# Patient Record
Sex: MALE | Race: BLACK OR AFRICAN AMERICAN | NOT HISPANIC OR LATINO | URBAN - METROPOLITAN AREA
[De-identification: names, ages, dates, MRNs, and addresses within clinical notes are randomized per-mention and may not be internally consistent; named-entity substitution may affect disease eponyms.]

---

## 2018-02-10 ENCOUNTER — EMERGENCY (EMERGENCY)
Facility: HOSPITAL | Age: 68
LOS: 1 days | Discharge: HOME | End: 2018-02-10
Attending: EMERGENCY MEDICINE
Payer: COMMERCIAL

## 2018-02-10 VITALS
SYSTOLIC BLOOD PRESSURE: 145 MMHG | HEART RATE: 60 BPM | WEIGHT: 250 LBS | RESPIRATION RATE: 20 BRPM | DIASTOLIC BLOOD PRESSURE: 85 MMHG | HEIGHT: 74 IN | TEMPERATURE: 98 F | OXYGEN SATURATION: 99 %

## 2018-02-10 LAB
ANION GAP SERPL CALC-SCNC: 8 MMOL/L — SIGNIFICANT CHANGE UP (ref 5–17)
BASOPHILS # BLD AUTO: 0 K/UL — SIGNIFICANT CHANGE UP (ref 0–0.2)
BASOPHILS NFR BLD AUTO: 0.4 % — SIGNIFICANT CHANGE UP (ref 0–2)
BUN SERPL-MCNC: 13 MG/DL — SIGNIFICANT CHANGE UP (ref 8–20)
CALCIUM SERPL-MCNC: 8.5 MG/DL — LOW (ref 8.6–10.2)
CHLORIDE SERPL-SCNC: 102 MMOL/L — SIGNIFICANT CHANGE UP (ref 98–107)
CO2 SERPL-SCNC: 24 MMOL/L — SIGNIFICANT CHANGE UP (ref 22–29)
CREAT SERPL-MCNC: 1.39 MG/DL — HIGH (ref 0.5–1.3)
EOSINOPHIL # BLD AUTO: 0.2 K/UL — SIGNIFICANT CHANGE UP (ref 0–0.5)
EOSINOPHIL NFR BLD AUTO: 4.6 % — SIGNIFICANT CHANGE UP (ref 0–5)
ERYTHROCYTE [SEDIMENTATION RATE] IN BLOOD: 15 MM/HR — SIGNIFICANT CHANGE UP (ref 0–20)
GLUCOSE SERPL-MCNC: 98 MG/DL — SIGNIFICANT CHANGE UP (ref 70–115)
HCT VFR BLD CALC: 39.1 % — LOW (ref 42–52)
HGB BLD-MCNC: 12.8 G/DL — LOW (ref 14–18)
LYMPHOCYTES # BLD AUTO: 1.9 K/UL — SIGNIFICANT CHANGE UP (ref 1–4.8)
LYMPHOCYTES # BLD AUTO: 35.7 % — SIGNIFICANT CHANGE UP (ref 20–55)
MCHC RBC-ENTMCNC: 28.1 PG — SIGNIFICANT CHANGE UP (ref 27–31)
MCHC RBC-ENTMCNC: 32.7 G/DL — SIGNIFICANT CHANGE UP (ref 32–36)
MCV RBC AUTO: 85.9 FL — SIGNIFICANT CHANGE UP (ref 80–94)
MONOCYTES # BLD AUTO: 0.7 K/UL — SIGNIFICANT CHANGE UP (ref 0–0.8)
MONOCYTES NFR BLD AUTO: 13.4 % — HIGH (ref 3–10)
NEUTROPHILS # BLD AUTO: 2.5 K/UL — SIGNIFICANT CHANGE UP (ref 1.8–8)
NEUTROPHILS NFR BLD AUTO: 45.9 % — SIGNIFICANT CHANGE UP (ref 37–73)
PLATELET # BLD AUTO: 180 K/UL — SIGNIFICANT CHANGE UP (ref 150–400)
POTASSIUM SERPL-MCNC: 5 MMOL/L — SIGNIFICANT CHANGE UP (ref 3.5–5.3)
POTASSIUM SERPL-SCNC: 5 MMOL/L — SIGNIFICANT CHANGE UP (ref 3.5–5.3)
RBC # BLD: 4.55 M/UL — LOW (ref 4.6–6.2)
RBC # FLD: 14.4 % — SIGNIFICANT CHANGE UP (ref 11–15.6)
SODIUM SERPL-SCNC: 134 MMOL/L — LOW (ref 135–145)
WBC # BLD: 5.4 K/UL — SIGNIFICANT CHANGE UP (ref 4.8–10.8)
WBC # FLD AUTO: 5.4 K/UL — SIGNIFICANT CHANGE UP (ref 4.8–10.8)

## 2018-02-10 PROCEDURE — 70450 CT HEAD/BRAIN W/O DYE: CPT | Mod: 26

## 2018-02-10 PROCEDURE — 93010 ELECTROCARDIOGRAM REPORT: CPT

## 2018-02-10 PROCEDURE — 85027 COMPLETE CBC AUTOMATED: CPT

## 2018-02-10 PROCEDURE — 80048 BASIC METABOLIC PNL TOTAL CA: CPT

## 2018-02-10 PROCEDURE — 93005 ELECTROCARDIOGRAM TRACING: CPT

## 2018-02-10 PROCEDURE — 99284 EMERGENCY DEPT VISIT MOD MDM: CPT | Mod: 25

## 2018-02-10 PROCEDURE — 70450 CT HEAD/BRAIN W/O DYE: CPT

## 2018-02-10 PROCEDURE — 36415 COLL VENOUS BLD VENIPUNCTURE: CPT

## 2018-02-10 PROCEDURE — 85652 RBC SED RATE AUTOMATED: CPT

## 2018-02-10 RX ORDER — IBUPROFEN 200 MG
1 TABLET ORAL
Qty: 16 | Refills: 0
Start: 2018-02-10 | End: 2018-02-13

## 2018-02-10 RX ORDER — IBUPROFEN 200 MG
600 TABLET ORAL ONCE
Qty: 0 | Refills: 0 | Status: COMPLETED | OUTPATIENT
Start: 2018-02-10 | End: 2018-02-10

## 2018-02-10 RX ADMIN — Medication 600 MILLIGRAM(S): at 01:19

## 2018-02-10 NOTE — ED PROVIDER NOTE - OBJECTIVE STATEMENT
69 y/o male with PMHx HTN presents to the ED with c/o left sided jaw pain radiating to left temporal, onset 1 week ago. Pt states pain occurred while eating 1 week ago. Pt states pain is worse with eating. Pt reports "clicking" sound and difficulty opening jaw secondary to pain. Pt denies fever, chills, headache, and any other acute symptoms and complaints at this time.

## 2018-02-10 NOTE — ED ADULT NURSE NOTE - OBJECTIVE STATEMENT
received pt AOx4 in supertrack, c/o left sided jaw pain, and head ache, states it started a week ago while he was eating he felt a sharp pain and states the pain never went away. "hurts to eat",  resp even unlabored, in no distress, MAEx4, neuro intact. will continue to monitor

## 2018-02-10 NOTE — ED PROVIDER NOTE - ATTENDING CONTRIBUTION TO CARE
68y old with complaints of left jaw pain with temporal pain, no vision loss, no headache, no paresthesia. possible temporal arteritis, no tia, visual changes. plan to check labs, ct, follow up with dental, possibe tmj. ekg, follow up with neurology and cardiology, pmf

## 2019-02-17 ENCOUNTER — EMERGENCY (EMERGENCY)
Facility: HOSPITAL | Age: 69
LOS: 1 days | Discharge: HOME | End: 2019-02-17
Attending: STUDENT IN AN ORGANIZED HEALTH CARE EDUCATION/TRAINING PROGRAM
Payer: COMMERCIAL

## 2019-02-17 VITALS
SYSTOLIC BLOOD PRESSURE: 170 MMHG | HEART RATE: 70 BPM | OXYGEN SATURATION: 99 % | RESPIRATION RATE: 18 BRPM | DIASTOLIC BLOOD PRESSURE: 83 MMHG | HEIGHT: 74 IN | WEIGHT: 244.93 LBS | TEMPERATURE: 98 F

## 2019-02-17 PROCEDURE — 99283 EMERGENCY DEPT VISIT LOW MDM: CPT

## 2019-02-17 RX ORDER — DIPHENHYDRAMINE HCL 50 MG
25 CAPSULE ORAL ONCE
Qty: 0 | Refills: 0 | Status: COMPLETED | OUTPATIENT
Start: 2019-02-17 | End: 2019-02-17

## 2019-02-17 RX ORDER — DIPHENHYDRAMINE HCL 50 MG
1 CAPSULE ORAL
Qty: 20 | Refills: 0
Start: 2019-02-17

## 2019-02-17 RX ADMIN — Medication 25 MILLIGRAM(S): at 18:30

## 2019-02-17 RX ADMIN — Medication 50 MILLIGRAM(S): at 18:29

## 2019-02-17 NOTE — ED STATDOCS - OBJECTIVE STATEMENT
69 y.o M c/o b/l eye itching, left eye redness and left eyelid swelling today with some associated dyspnea. Symptoms started while pt was resting on the couch, unclear precipitating factors. Similar symptoms in the past after drinking strawberry milk. Denies difficulty swallowing, pain with movement of the eyes, blurry vision, eye pain or additional physical complaints at this time. 69 y.o M c/o b/l eye itching, left eye redness and left eyelid swelling today. Symptoms started while pt was resting on the couch, unclear precipitating factors. Similar symptoms in the past after drinking strawberry milk. Denies difficulty swallowing, pain with movement of the eyes, blurry vision, eye pain or additional physical complaints at this time.

## 2019-02-17 NOTE — ED STATDOCS - CLINICAL SUMMARY MEDICAL DECISION MAKING FREE TEXT BOX
69 y.o M pt with likely allergic reaction, no signs of cellulitis. Will treat with benadryl and steroids, have patient f/u with outpatient, instructed to return with any new or worsening symptoms.

## 2019-02-17 NOTE — ED STATDOCS - NS ED ROS FT
Review of Systems  •	CONSTITUTIONAL - no  fever, no diaphoresis, no weight change  •	SKIN - no rash  •	HEMATOLOGIC - no bleeding, no bruising  •	EYES - no eye pain, no blurred vision, (+) eyelid swelling, (+) eye redness, (+) eye itching  •	ENT - no change in hearing, no pain  •	RESPIRATORY - no shortness of breath, no cough  •	CARDIAC - no chest pain, no palpitations  •	GI - no abd pain, no nausea, no vomiting, no diarrhea, no constipation, no bleeding  •	GENITO-URINARY - no discharge, no dysuria; no hematuria,

## 2019-02-17 NOTE — ED STATDOCS - PHYSICAL EXAMINATION
Constitutional - well-developed; well nourished. Head - NCAT. Airway patent. Eyes - EOMI, swelling to left upper eyelid, no conjunctival injection, no pain on extraocular movement, no tenderness to palpation of the swelling, vision grossly intact. CV - RRR. no murmur. no edema. Pulm - CTAB. Abd - soft, nt. no rebound. no guarding. Neuro - A&Ox3. strength 5/5 x4. sensation intact x4. normal gait. Skin - No rash. MSK - normal ROM.

## 2019-02-18 PROBLEM — I10 ESSENTIAL (PRIMARY) HYPERTENSION: Chronic | Status: ACTIVE | Noted: 2018-02-10

## 2020-03-19 ENCOUNTER — EMERGENCY (EMERGENCY)
Facility: HOSPITAL | Age: 70
LOS: 1 days | Discharge: HOME | End: 2020-03-19
Attending: EMERGENCY MEDICINE
Payer: COMMERCIAL

## 2020-03-19 VITALS
OXYGEN SATURATION: 97 % | TEMPERATURE: 98 F | HEART RATE: 96 BPM | RESPIRATION RATE: 18 BRPM | WEIGHT: 214.07 LBS | SYSTOLIC BLOOD PRESSURE: 148 MMHG | HEIGHT: 74 IN | DIASTOLIC BLOOD PRESSURE: 85 MMHG

## 2020-03-19 VITALS
DIASTOLIC BLOOD PRESSURE: 80 MMHG | OXYGEN SATURATION: 99 % | HEART RATE: 82 BPM | SYSTOLIC BLOOD PRESSURE: 150 MMHG | RESPIRATION RATE: 20 BRPM | TEMPERATURE: 98 F

## 2020-03-19 LAB
ALBUMIN SERPL ELPH-MCNC: 3.2 G/DL — LOW (ref 3.3–5.2)
ALP SERPL-CCNC: 58 U/L — SIGNIFICANT CHANGE UP (ref 40–120)
ALT FLD-CCNC: 16 U/L — SIGNIFICANT CHANGE UP
ANION GAP SERPL CALC-SCNC: 9 MMOL/L — SIGNIFICANT CHANGE UP (ref 5–17)
ANISOCYTOSIS BLD QL: SLIGHT — SIGNIFICANT CHANGE UP
APPEARANCE UR: CLEAR — SIGNIFICANT CHANGE UP
AST SERPL-CCNC: 29 U/L — SIGNIFICANT CHANGE UP
BACTERIA # UR AUTO: NEGATIVE — SIGNIFICANT CHANGE UP
BASOPHILS # BLD AUTO: 0 K/UL — SIGNIFICANT CHANGE UP (ref 0–0.2)
BASOPHILS NFR BLD AUTO: 0 % — SIGNIFICANT CHANGE UP (ref 0–2)
BILIRUB SERPL-MCNC: 0.4 MG/DL — SIGNIFICANT CHANGE UP (ref 0.4–2)
BILIRUB UR-MCNC: NEGATIVE — SIGNIFICANT CHANGE UP
BUN SERPL-MCNC: 13 MG/DL — SIGNIFICANT CHANGE UP (ref 8–20)
BURR CELLS BLD QL SMEAR: PRESENT — SIGNIFICANT CHANGE UP
CALCIUM SERPL-MCNC: 8.2 MG/DL — LOW (ref 8.6–10.2)
CHLORIDE SERPL-SCNC: 108 MMOL/L — HIGH (ref 98–107)
CK SERPL-CCNC: 134 U/L — SIGNIFICANT CHANGE UP (ref 30–200)
CO2 SERPL-SCNC: 19 MMOL/L — LOW (ref 22–29)
COLOR SPEC: YELLOW — SIGNIFICANT CHANGE UP
CREAT SERPL-MCNC: 1.36 MG/DL — HIGH (ref 0.5–1.3)
DACRYOCYTES BLD QL SMEAR: SLIGHT — SIGNIFICANT CHANGE UP
DIFF PNL FLD: ABNORMAL
ELLIPTOCYTES BLD QL SMEAR: SLIGHT — SIGNIFICANT CHANGE UP
EOSINOPHIL # BLD AUTO: 0 K/UL — SIGNIFICANT CHANGE UP (ref 0–0.5)
EOSINOPHIL NFR BLD AUTO: 0 % — SIGNIFICANT CHANGE UP (ref 0–6)
EPI CELLS # UR: SIGNIFICANT CHANGE UP
GIANT PLATELETS BLD QL SMEAR: PRESENT — SIGNIFICANT CHANGE UP
GLUCOSE SERPL-MCNC: 99 MG/DL — SIGNIFICANT CHANGE UP (ref 70–99)
GLUCOSE UR QL: NEGATIVE MG/DL — SIGNIFICANT CHANGE UP
HCT VFR BLD CALC: 36.7 % — LOW (ref 39–50)
HGB BLD-MCNC: 11.8 G/DL — LOW (ref 13–17)
HYPOCHROMIA BLD QL: SLIGHT — SIGNIFICANT CHANGE UP
KETONES UR-MCNC: NEGATIVE — SIGNIFICANT CHANGE UP
LEUKOCYTE ESTERASE UR-ACNC: NEGATIVE — SIGNIFICANT CHANGE UP
LYMPHOCYTES # BLD AUTO: 0.7 K/UL — LOW (ref 1–3.3)
LYMPHOCYTES # BLD AUTO: 11.5 % — LOW (ref 13–44)
MACROCYTES BLD QL: SLIGHT — SIGNIFICANT CHANGE UP
MANUAL SMEAR VERIFICATION: SIGNIFICANT CHANGE UP
MCHC RBC-ENTMCNC: 28.8 PG — SIGNIFICANT CHANGE UP (ref 27–34)
MCHC RBC-ENTMCNC: 32.2 GM/DL — SIGNIFICANT CHANGE UP (ref 32–36)
MCV RBC AUTO: 89.5 FL — SIGNIFICANT CHANGE UP (ref 80–100)
MICROCYTES BLD QL: SLIGHT — SIGNIFICANT CHANGE UP
MONOCYTES # BLD AUTO: 0.38 K/UL — SIGNIFICANT CHANGE UP (ref 0–0.9)
MONOCYTES NFR BLD AUTO: 6.2 % — SIGNIFICANT CHANGE UP (ref 2–14)
NEUTROPHILS # BLD AUTO: 4.93 K/UL — SIGNIFICANT CHANGE UP (ref 1.8–7.4)
NEUTROPHILS NFR BLD AUTO: 80.5 % — HIGH (ref 43–77)
NEUTS BAND # BLD: 0.9 % — SIGNIFICANT CHANGE UP (ref 0–8)
NITRITE UR-MCNC: NEGATIVE — SIGNIFICANT CHANGE UP
OVALOCYTES BLD QL SMEAR: SLIGHT — SIGNIFICANT CHANGE UP
PH UR: 6 — SIGNIFICANT CHANGE UP (ref 5–8)
PLAT MORPH BLD: NORMAL — SIGNIFICANT CHANGE UP
PLATELET # BLD AUTO: 142 K/UL — LOW (ref 150–400)
POIKILOCYTOSIS BLD QL AUTO: SLIGHT — SIGNIFICANT CHANGE UP
POLYCHROMASIA BLD QL SMEAR: SLIGHT — SIGNIFICANT CHANGE UP
POTASSIUM SERPL-MCNC: 4.3 MMOL/L — SIGNIFICANT CHANGE UP (ref 3.5–5.3)
POTASSIUM SERPL-SCNC: 4.3 MMOL/L — SIGNIFICANT CHANGE UP (ref 3.5–5.3)
PROT SERPL-MCNC: 7.2 G/DL — SIGNIFICANT CHANGE UP (ref 6.6–8.7)
PROT UR-MCNC: 30 MG/DL
RBC # BLD: 4.1 M/UL — LOW (ref 4.2–5.8)
RBC # FLD: 15.2 % — HIGH (ref 10.3–14.5)
RBC BLD AUTO: ABNORMAL
RBC CASTS # UR COMP ASSIST: ABNORMAL /HPF (ref 0–4)
SODIUM SERPL-SCNC: 136 MMOL/L — SIGNIFICANT CHANGE UP (ref 135–145)
SP GR SPEC: 1.01 — SIGNIFICANT CHANGE UP (ref 1.01–1.02)
TROPONIN T SERPL-MCNC: <0.01 NG/ML — SIGNIFICANT CHANGE UP (ref 0–0.06)
UROBILINOGEN FLD QL: NEGATIVE MG/DL — SIGNIFICANT CHANGE UP
VARIANT LYMPHS # BLD: 0.9 % — SIGNIFICANT CHANGE UP (ref 0–6)
WBC # BLD: 6.06 K/UL — SIGNIFICANT CHANGE UP (ref 3.8–10.5)
WBC # FLD AUTO: 6.06 K/UL — SIGNIFICANT CHANGE UP (ref 3.8–10.5)
WBC UR QL: SIGNIFICANT CHANGE UP

## 2020-03-19 PROCEDURE — 82962 GLUCOSE BLOOD TEST: CPT

## 2020-03-19 PROCEDURE — 82550 ASSAY OF CK (CPK): CPT

## 2020-03-19 PROCEDURE — 84484 ASSAY OF TROPONIN QUANT: CPT

## 2020-03-19 PROCEDURE — 36415 COLL VENOUS BLD VENIPUNCTURE: CPT

## 2020-03-19 PROCEDURE — 93010 ELECTROCARDIOGRAM REPORT: CPT

## 2020-03-19 PROCEDURE — 93005 ELECTROCARDIOGRAM TRACING: CPT

## 2020-03-19 PROCEDURE — 70450 CT HEAD/BRAIN W/O DYE: CPT

## 2020-03-19 PROCEDURE — 99284 EMERGENCY DEPT VISIT MOD MDM: CPT | Mod: 25

## 2020-03-19 PROCEDURE — 71046 X-RAY EXAM CHEST 2 VIEWS: CPT

## 2020-03-19 PROCEDURE — 96374 THER/PROPH/DIAG INJ IV PUSH: CPT

## 2020-03-19 PROCEDURE — 85027 COMPLETE CBC AUTOMATED: CPT

## 2020-03-19 PROCEDURE — 71046 X-RAY EXAM CHEST 2 VIEWS: CPT | Mod: 26

## 2020-03-19 PROCEDURE — 99285 EMERGENCY DEPT VISIT HI MDM: CPT

## 2020-03-19 PROCEDURE — 81001 URINALYSIS AUTO W/SCOPE: CPT

## 2020-03-19 PROCEDURE — 70450 CT HEAD/BRAIN W/O DYE: CPT | Mod: 26

## 2020-03-19 PROCEDURE — 80053 COMPREHEN METABOLIC PANEL: CPT

## 2020-03-19 RX ORDER — METOCLOPRAMIDE HCL 10 MG
10 TABLET ORAL ONCE
Refills: 0 | Status: COMPLETED | OUTPATIENT
Start: 2020-03-19 | End: 2020-03-19

## 2020-03-19 RX ORDER — MECLIZINE HCL 12.5 MG
2 TABLET ORAL
Qty: 20 | Refills: 0
Start: 2020-03-19 | End: 2020-03-23

## 2020-03-19 RX ORDER — MECLIZINE HCL 12.5 MG
25 TABLET ORAL ONCE
Refills: 0 | Status: COMPLETED | OUTPATIENT
Start: 2020-03-19 | End: 2020-03-19

## 2020-03-19 RX ORDER — LORATADINE 10 MG/1
10 TABLET ORAL DAILY
Refills: 0 | Status: DISCONTINUED | OUTPATIENT
Start: 2020-03-19 | End: 2020-03-24

## 2020-03-19 RX ADMIN — Medication 10 MILLIGRAM(S): at 09:27

## 2020-03-19 RX ADMIN — LORATADINE 10 MILLIGRAM(S): 10 TABLET ORAL at 12:39

## 2020-03-19 RX ADMIN — Medication 25 MILLIGRAM(S): at 09:26

## 2020-03-19 RX ADMIN — Medication 25 MILLIGRAM(S): at 10:25

## 2020-03-19 NOTE — ED PROVIDER NOTE - PATIENT PORTAL LINK FT
You can access the FollowMyHealth Patient Portal offered by [facility] by registering at the following website: [email or web address]. By joining [facility]’s FollowMyHealth portal, you will also be able to view your health information using other applications (apps) compatible with our system.

## 2020-03-19 NOTE — ED ADULT NURSE REASSESSMENT NOTE - NS ED NURSE REASSESS COMMENT FT1
1025- pt was evaluated by ER MD. blood work drawn. pt medciated for dizziness after passing dysphagia screen. pt ambulatory to bathroom, gait steady however mild tremor noted to body. ER MD aware. no distress noted. denies daily alcohol use. skin warm, dry.

## 2020-03-19 NOTE — ED PROVIDER NOTE - PHYSICAL EXAMINATION
Gen: Alert, NAD  Head: NC, AT, PERRL, EOMI, normal lids/conjunctiva  ENT: normal hearing, patent oropharynx without erythema/exudate, uvula midline  Neck: +supple, no tenderness/meningismus/JVD, +Trachea midline  Pulm: Bilateral BS, normal resp effort, no wheeze/stridor/retractions  CV: RRR, no M/R/G, +dist pulses  Abd: soft, NT/ND, +BS, no hepatosplenomegaly  Mskel: no edema/erythema/cyanosis  Skin: no rash  Neuro: AAOx3, no gross sensory/motor deficits, no dysmetria

## 2020-03-19 NOTE — ED ADULT TRIAGE NOTE - CHIEF COMPLAINT QUOTE
"my BP is high." denies chest pain or headache. pt states took BP medicine last night with no change in BP.

## 2020-03-19 NOTE — ED STATDOCS - PROGRESS NOTE DETAILS
71 y/o M pt with significant PMHx of HTN presents to the ED c/o HTN onset last night. Associated symptoms include altered gait, dizziness, fatigue, hand tightness and right ear pain. He reports that he feels like his BP is high. Patient states he does not feel "comfortable". He states he went to sleep at 21:00 last night, and woke up at 02:00 this morning with "hand tightness" and reports that he was having trouble sleeping after that. Patient has HTN medication, and has been compliant with it. Denies fever, chills, cough. Non drinker. On PE: right based gait with ataxia. congested eyes and teary on exam. Patient does not appear with stroke like symptoms, woke up at 02:00. Does not meet stroke criteria. NIH stroke scale=0. Will obtain finger stick, EKG and send to main for further evaluation. Patient is poor historian. 71 y/o M pt with significant PMHx of HTN presents to the ED c/o HTN onset last night. Associated symptoms include altered gait, dizziness, fatigue, hand tightness and right ear pain. He reports that he feels like his BP is high. Patient states he does not feel "comfortable". He states he went to sleep at 21:00 last night, and woke up at 02:00 this morning with "hand tightness" and reports that he was having trouble sleeping after that. Patient has HTN medication, and has been compliant with it. Denies fever, chills, cough. Non drinker. On PE: right based gait with ataxia. Some rigidity, congested eyes and teary on exam. Patient does not appear with stroke like symptoms, woke up at 02:00. Does not meet stroke criteria. NIH stroke scale=0. Will obtain finger stick, EKG and send to [facility] for further evaluation. Patient is also noted to be a poor historian.

## 2020-03-19 NOTE — ED ADULT NURSE REASSESSMENT NOTE - NS ED NURSE REASSESS COMMENT FT1
1230- pt remains with neuro WNL with the exception of mild tremor generalized. v/s stable. no distress noted. comfort and safety measures in place.   1534- pt was reevaluated by ER MD. no distress noted. neuro remains WNL with the exception of mild generalized tremor. DC instructions reviewed with pt - see ED Nurse note. pt was ambulatory out of ED with SNA to escort pt to waiting room, gait steady, wife to drive pt home.

## 2020-03-19 NOTE — ED ADULT NURSE NOTE - OBJECTIVE STATEMENT
pt presents to ED reporting he felt fine last night and then awoke at 2am "not feeling right" reports he walked to the bathroom and felt unsteady gait. pt denies any cough/fevers. denies any sick contacts. denies recent travel. pt a&ox4. v/s stable. no facial droop/arm drift/slurred speech noted. reports feeling dizzy at times. denies any chest pain/sob. skin warm, dry.

## 2020-03-19 NOTE — ED PROCEDURE NOTE - GENERAL PROCEDURE DETAILS
Educational study of the chest. Normal EF, no D sign, no pericardial effusion, normal lung sliding, no B lines or pleural effusion, IVC within normal limits. Confirmatory study obtained.

## 2020-03-19 NOTE — ED PROVIDER NOTE - OBJECTIVE STATEMENT
Pertinent PMH/PSH/FHx/SHx and Review of Systems contained within:  Patient presents to the ED for "feeling dizzy."  Patient went to sleep at baseline.  awoke at 0200 with dizziness.  PMH of HTN.  Unlike initial statdoc, patient is valid historian.  offered  but refused.  VSS.  Feeling better in ED.  no trauma.  no other concerns.  no focal deficits.  NIHSS is zero during hx.   no trauma.  Patient reports his ear feels congested.  Non toxic.  Well appearing. No aggravating or relieving factors. No other pertinent PMH.  No other pertinent PSH.  No other pertinent FHx.  Patient denies EtOH/tobacco/illicit substance use. No fever/chills, No photophobia/eye pain/changes in vision, No sore throat/dysphagia, No chest pain/palpitations, no SOB/cough/wheeze/stridor, No abdominal pain, No N/V/D, no dysuria/frequency/discharge, No neck/back pain, no rash, no other changes in neurological status/function.

## 2020-03-19 NOTE — ED PROVIDER NOTE - CARE PROVIDER_API CALL
Michael Caputo; PhD)  Neurology; Vascular Neurology  [address] [place] [address]  Phone: [phone]  Fax: [phone]  Follow Up Time:

## 2020-03-19 NOTE — ED PROVIDER NOTE - CLINICAL SUMMARY MEDICAL DECISION MAKING FREE TEXT BOX
Patient presents with vertigo.  VSS.  Lab values do not require emergent intervention. Chest xray demonstrates no acute pathology. CT scan demonstrates no acute pathology. Patient reassessed and no longer dizzy.  smiling.  again refuses .  nicole lf/u.  Uneventful ED observation period. Non toxic.  Well appearing. Patient given prescription medications for their condition and advised to take them as prescribed and check with their Primary Care Provider if any questions arise. Discussed results and outcome of testing with the patient.  Patient advised to please follow up with their primary care doctor within the next 24 hours and return to the Emergency Department for worsening symptoms or any other concerns.  Patient advised that their doctor may call  to follow up on the specific results of the tests performed today in the emergency department.

## 2020-03-20 ENCOUNTER — INPATIENT (INPATIENT)
Facility: HOSPITAL | Age: 70
LOS: 17 days | Discharge: HOME | DRG: 208 | End: 2020-04-07
Attending: HOSPITALIST | Admitting: INTERNAL MEDICINE
Payer: COMMERCIAL

## 2020-03-20 VITALS
DIASTOLIC BLOOD PRESSURE: 87 MMHG | HEIGHT: 74 IN | TEMPERATURE: 97 F | RESPIRATION RATE: 15 BRPM | OXYGEN SATURATION: 98 % | WEIGHT: 220.46 LBS | HEART RATE: 87 BPM | SYSTOLIC BLOOD PRESSURE: 152 MMHG

## 2020-03-20 DIAGNOSIS — J96.01 ACUTE RESPIRATORY FAILURE WITH HYPOXIA: ICD-10-CM

## 2020-03-20 DIAGNOSIS — R56.9 UNSPECIFIED CONVULSIONS: ICD-10-CM

## 2020-03-20 DIAGNOSIS — J69.0 PNEUMONITIS DUE TO INHALATION OF FOOD AND VOMIT: ICD-10-CM

## 2020-03-20 DIAGNOSIS — I10 ESSENTIAL (PRIMARY) HYPERTENSION: ICD-10-CM

## 2020-03-20 LAB
ALBUMIN SERPL ELPH-MCNC: 3.3 G/DL — SIGNIFICANT CHANGE UP (ref 3.3–5.2)
ALP SERPL-CCNC: 91 U/L — SIGNIFICANT CHANGE UP (ref 40–120)
ALT FLD-CCNC: 23 U/L — SIGNIFICANT CHANGE UP
ANION GAP SERPL CALC-SCNC: 19 MMOL/L — HIGH (ref 5–17)
APPEARANCE UR: CLEAR — SIGNIFICANT CHANGE UP
APTT BLD: 34.1 SEC — SIGNIFICANT CHANGE UP (ref 27.5–36.3)
AST SERPL-CCNC: 44 U/L — HIGH
BACTERIA # UR AUTO: NEGATIVE — SIGNIFICANT CHANGE UP
BASOPHILS # BLD AUTO: 0.05 K/UL — SIGNIFICANT CHANGE UP (ref 0–0.2)
BASOPHILS NFR BLD AUTO: 0.5 % — SIGNIFICANT CHANGE UP (ref 0–2)
BILIRUB SERPL-MCNC: 0.5 MG/DL — SIGNIFICANT CHANGE UP (ref 0.4–2)
BILIRUB UR-MCNC: NEGATIVE — SIGNIFICANT CHANGE UP
BUN SERPL-MCNC: 16 MG/DL — SIGNIFICANT CHANGE UP (ref 8–20)
CALCIUM SERPL-MCNC: 8.3 MG/DL — LOW (ref 8.6–10.2)
CHLORIDE SERPL-SCNC: 105 MMOL/L — SIGNIFICANT CHANGE UP (ref 98–107)
CO2 SERPL-SCNC: 14 MMOL/L — LOW (ref 22–29)
COLOR SPEC: YELLOW — SIGNIFICANT CHANGE UP
CREAT SERPL-MCNC: 1.42 MG/DL — HIGH (ref 0.5–1.3)
DIFF PNL FLD: ABNORMAL
EOSINOPHIL # BLD AUTO: 0.03 K/UL — SIGNIFICANT CHANGE UP (ref 0–0.5)
EOSINOPHIL NFR BLD AUTO: 0.3 % — SIGNIFICANT CHANGE UP (ref 0–6)
EPI CELLS # UR: NEGATIVE — SIGNIFICANT CHANGE UP
GLUCOSE SERPL-MCNC: 139 MG/DL — HIGH (ref 70–99)
GLUCOSE UR QL: NEGATIVE MG/DL — SIGNIFICANT CHANGE UP
HCT VFR BLD CALC: 39.4 % — SIGNIFICANT CHANGE UP (ref 39–50)
HGB BLD-MCNC: 12.8 G/DL — LOW (ref 13–17)
IMM GRANULOCYTES NFR BLD AUTO: 5.3 % — HIGH (ref 0–1.5)
INR BLD: 1.21 RATIO — HIGH (ref 0.88–1.16)
KETONES UR-MCNC: NEGATIVE — SIGNIFICANT CHANGE UP
LEUKOCYTE ESTERASE UR-ACNC: NEGATIVE — SIGNIFICANT CHANGE UP
LYMPHOCYTES # BLD AUTO: 1.73 K/UL — SIGNIFICANT CHANGE UP (ref 1–3.3)
LYMPHOCYTES # BLD AUTO: 17.6 % — SIGNIFICANT CHANGE UP (ref 13–44)
MCHC RBC-ENTMCNC: 28.3 PG — SIGNIFICANT CHANGE UP (ref 27–34)
MCHC RBC-ENTMCNC: 32.5 GM/DL — SIGNIFICANT CHANGE UP (ref 32–36)
MCV RBC AUTO: 87 FL — SIGNIFICANT CHANGE UP (ref 80–100)
MONOCYTES # BLD AUTO: 1.28 K/UL — HIGH (ref 0–0.9)
MONOCYTES NFR BLD AUTO: 13 % — SIGNIFICANT CHANGE UP (ref 2–14)
NEUTROPHILS # BLD AUTO: 6.21 K/UL — SIGNIFICANT CHANGE UP (ref 1.8–7.4)
NEUTROPHILS NFR BLD AUTO: 63.3 % — SIGNIFICANT CHANGE UP (ref 43–77)
NITRITE UR-MCNC: NEGATIVE — SIGNIFICANT CHANGE UP
PH UR: 5 — SIGNIFICANT CHANGE UP (ref 5–8)
PLATELET # BLD AUTO: 132 K/UL — LOW (ref 150–400)
POTASSIUM SERPL-MCNC: 4.7 MMOL/L — SIGNIFICANT CHANGE UP (ref 3.5–5.3)
POTASSIUM SERPL-SCNC: 4.7 MMOL/L — SIGNIFICANT CHANGE UP (ref 3.5–5.3)
PROT SERPL-MCNC: 7.3 G/DL — SIGNIFICANT CHANGE UP (ref 6.6–8.7)
PROT UR-MCNC: 100 MG/DL
PROTHROM AB SERPL-ACNC: 13.8 SEC — HIGH (ref 10–12.9)
RBC # BLD: 4.53 M/UL — SIGNIFICANT CHANGE UP (ref 4.2–5.8)
RBC # FLD: 14.6 % — HIGH (ref 10.3–14.5)
RBC CASTS # UR COMP ASSIST: ABNORMAL /HPF (ref 0–4)
SODIUM SERPL-SCNC: 138 MMOL/L — SIGNIFICANT CHANGE UP (ref 135–145)
SP GR SPEC: 1.02 — SIGNIFICANT CHANGE UP (ref 1.01–1.02)
TROPONIN T SERPL-MCNC: <0.01 NG/ML — SIGNIFICANT CHANGE UP (ref 0–0.06)
UROBILINOGEN FLD QL: NEGATIVE MG/DL — SIGNIFICANT CHANGE UP
WBC # BLD: 9.82 K/UL — SIGNIFICANT CHANGE UP (ref 3.8–10.5)
WBC # FLD AUTO: 9.82 K/UL — SIGNIFICANT CHANGE UP (ref 3.8–10.5)
WBC UR QL: SIGNIFICANT CHANGE UP

## 2020-03-20 PROCEDURE — 71250 CT THORAX DX C-: CPT | Mod: 26

## 2020-03-20 PROCEDURE — 93010 ELECTROCARDIOGRAM REPORT: CPT

## 2020-03-20 PROCEDURE — 71045 X-RAY EXAM CHEST 1 VIEW: CPT | Mod: 26

## 2020-03-20 PROCEDURE — 70498 CT ANGIOGRAPHY NECK: CPT | Mod: 26

## 2020-03-20 PROCEDURE — 70496 CT ANGIOGRAPHY HEAD: CPT | Mod: 26

## 2020-03-20 PROCEDURE — 99291 CRITICAL CARE FIRST HOUR: CPT

## 2020-03-20 PROCEDURE — 70450 CT HEAD/BRAIN W/O DYE: CPT | Mod: 26

## 2020-03-20 RX ORDER — VANCOMYCIN HCL 1 G
1000 VIAL (EA) INTRAVENOUS ONCE
Refills: 0 | Status: COMPLETED | OUTPATIENT
Start: 2020-03-20 | End: 2020-03-20

## 2020-03-20 RX ORDER — PIPERACILLIN AND TAZOBACTAM 4; .5 G/20ML; G/20ML
3.38 INJECTION, POWDER, LYOPHILIZED, FOR SOLUTION INTRAVENOUS EVERY 8 HOURS
Refills: 0 | Status: COMPLETED | OUTPATIENT
Start: 2020-03-20 | End: 2020-03-27

## 2020-03-20 RX ORDER — ENOXAPARIN SODIUM 100 MG/ML
40 INJECTION SUBCUTANEOUS DAILY
Refills: 0 | Status: DISCONTINUED | OUTPATIENT
Start: 2020-03-20 | End: 2020-04-07

## 2020-03-20 RX ORDER — MIDAZOLAM HYDROCHLORIDE 1 MG/ML
2 INJECTION, SOLUTION INTRAMUSCULAR; INTRAVENOUS ONCE
Refills: 0 | Status: DISCONTINUED | OUTPATIENT
Start: 2020-03-20 | End: 2020-03-20

## 2020-03-20 RX ORDER — SUCRALFATE 1 G
1 TABLET ORAL EVERY 6 HOURS
Refills: 0 | Status: DISCONTINUED | OUTPATIENT
Start: 2020-03-20 | End: 2020-03-26

## 2020-03-20 RX ORDER — SODIUM CHLORIDE 9 MG/ML
2000 INJECTION INTRAMUSCULAR; INTRAVENOUS; SUBCUTANEOUS ONCE
Refills: 0 | Status: COMPLETED | OUTPATIENT
Start: 2020-03-20 | End: 2020-03-20

## 2020-03-20 RX ORDER — CHLORHEXIDINE GLUCONATE 213 G/1000ML
15 SOLUTION TOPICAL
Refills: 0 | Status: DISCONTINUED | OUTPATIENT
Start: 2020-03-20 | End: 2020-04-07

## 2020-03-20 RX ORDER — PIPERACILLIN AND TAZOBACTAM 4; .5 G/20ML; G/20ML
3.38 INJECTION, POWDER, LYOPHILIZED, FOR SOLUTION INTRAVENOUS ONCE
Refills: 0 | Status: COMPLETED | OUTPATIENT
Start: 2020-03-20 | End: 2020-03-20

## 2020-03-20 RX ORDER — CHLORHEXIDINE GLUCONATE 213 G/1000ML
15 SOLUTION TOPICAL EVERY 12 HOURS
Refills: 0 | Status: DISCONTINUED | OUTPATIENT
Start: 2020-03-20 | End: 2020-03-21

## 2020-03-20 RX ORDER — LEVETIRACETAM 250 MG/1
1000 TABLET, FILM COATED ORAL ONCE
Refills: 0 | Status: COMPLETED | OUTPATIENT
Start: 2020-03-20 | End: 2020-03-20

## 2020-03-20 RX ORDER — CHLORHEXIDINE GLUCONATE 213 G/1000ML
1 SOLUTION TOPICAL
Refills: 0 | Status: DISCONTINUED | OUTPATIENT
Start: 2020-03-20 | End: 2020-04-07

## 2020-03-20 RX ORDER — PROPOFOL 10 MG/ML
41.67 INJECTION, EMULSION INTRAVENOUS
Qty: 1000 | Refills: 0 | Status: DISCONTINUED | OUTPATIENT
Start: 2020-03-20 | End: 2020-03-23

## 2020-03-20 RX ORDER — ETOMIDATE 2 MG/ML
20 INJECTION INTRAVENOUS ONCE
Refills: 0 | Status: COMPLETED | OUTPATIENT
Start: 2020-03-20 | End: 2020-03-20

## 2020-03-20 RX ORDER — ACETAMINOPHEN 500 MG
1000 TABLET ORAL ONCE
Refills: 0 | Status: COMPLETED | OUTPATIENT
Start: 2020-03-20 | End: 2020-03-20

## 2020-03-20 RX ORDER — PANTOPRAZOLE SODIUM 20 MG/1
40 TABLET, DELAYED RELEASE ORAL DAILY
Refills: 0 | Status: DISCONTINUED | OUTPATIENT
Start: 2020-03-20 | End: 2020-03-24

## 2020-03-20 RX ORDER — SUCCINYLCHOLINE CHLORIDE 100 MG/5ML
100 SYRINGE (ML) INTRAVENOUS ONCE
Refills: 0 | Status: COMPLETED | OUTPATIENT
Start: 2020-03-20 | End: 2020-03-20

## 2020-03-20 RX ORDER — LEVETIRACETAM 250 MG/1
500 TABLET, FILM COATED ORAL EVERY 12 HOURS
Refills: 0 | Status: DISCONTINUED | OUTPATIENT
Start: 2020-03-20 | End: 2020-03-24

## 2020-03-20 RX ADMIN — Medication 1000 MILLIGRAM(S): at 18:44

## 2020-03-20 RX ADMIN — Medication 100 MILLIGRAM(S): at 13:05

## 2020-03-20 RX ADMIN — ETOMIDATE 20 MILLIGRAM(S): 2 INJECTION INTRAVENOUS at 13:01

## 2020-03-20 RX ADMIN — SODIUM CHLORIDE 2000 MILLILITER(S): 9 INJECTION INTRAMUSCULAR; INTRAVENOUS; SUBCUTANEOUS at 09:40

## 2020-03-20 RX ADMIN — PROPOFOL 25 MICROGRAM(S)/KG/MIN: 10 INJECTION, EMULSION INTRAVENOUS at 22:56

## 2020-03-20 RX ADMIN — Medication 4 MILLIGRAM(S): at 09:10

## 2020-03-20 RX ADMIN — MIDAZOLAM HYDROCHLORIDE 2 MILLIGRAM(S): 1 INJECTION, SOLUTION INTRAMUSCULAR; INTRAVENOUS at 13:00

## 2020-03-20 RX ADMIN — Medication 250 MILLIGRAM(S): at 15:42

## 2020-03-20 RX ADMIN — LEVETIRACETAM 420 MILLIGRAM(S): 250 TABLET, FILM COATED ORAL at 22:57

## 2020-03-20 RX ADMIN — Medication 400 MILLIGRAM(S): at 18:28

## 2020-03-20 RX ADMIN — PIPERACILLIN AND TAZOBACTAM 200 GRAM(S): 4; .5 INJECTION, POWDER, LYOPHILIZED, FOR SOLUTION INTRAVENOUS at 22:57

## 2020-03-20 RX ADMIN — Medication 2 MILLIGRAM(S): at 09:00

## 2020-03-20 RX ADMIN — Medication 2 MILLIGRAM(S): at 10:45

## 2020-03-20 RX ADMIN — Medication 1 GRAM(S): at 23:04

## 2020-03-20 RX ADMIN — CHLORHEXIDINE GLUCONATE 15 MILLILITER(S): 213 SOLUTION TOPICAL at 18:39

## 2020-03-20 RX ADMIN — LEVETIRACETAM 400 MILLIGRAM(S): 250 TABLET, FILM COATED ORAL at 09:15

## 2020-03-20 RX ADMIN — PROPOFOL 25 MICROGRAM(S)/KG/MIN: 10 INJECTION, EMULSION INTRAVENOUS at 16:00

## 2020-03-20 RX ADMIN — PROPOFOL 25 MICROGRAM(S)/KG/MIN: 10 INJECTION, EMULSION INTRAVENOUS at 18:32

## 2020-03-20 RX ADMIN — PIPERACILLIN AND TAZOBACTAM 200 GRAM(S): 4; .5 INJECTION, POWDER, LYOPHILIZED, FOR SOLUTION INTRAVENOUS at 15:42

## 2020-03-20 NOTE — H&P ADULT - PROBLEM SELECTOR PLAN 3
Received ativan in ED  Loaded with Keppra will continue 500 mg IV bid  Neurology eval  24 hour EEG  Had neg head CT yest in ER and today, CTA done   Will need MRI

## 2020-03-20 NOTE — AIRWAY PLACEMENT NOTE ADULT - AIRWAY COMMENTS:
After intubation pt started losing TV on ventilator and had air leak, cuff checked and small amount air added but pt continued with not receiving volumes on ventilator and had emergent tube exchange using bougie the ETT was changed to a 7.5 cuffed ETT pt tolerated well and was placed on ventilator without complications

## 2020-03-20 NOTE — ED ADULT NURSE REASSESSMENT NOTE - NS ED NURSE REASSESS COMMENT FT1
pt had witnessed seizure, approx 45 seconds, bleeding from mouth r/t biting tounge, suctioned excess fluids from mouth, IV access attempted by MD JON/JOCELYNE RN/ JANE RN, 4mg IM ativan given, 2mg IV ativan given, 1gm keppra hung, 1 liter of fluids infusing, pt aphasic prior to line attempt, non rebreather applied, pt stable at this time, will continue to monitor

## 2020-03-20 NOTE — ED ADULT NURSE REASSESSMENT NOTE - NS ED NURSE REASSESS COMMENT FT1
PT began decompensating, agonal breathing with nonrebreather O2sat 90%, ICU came to assess, pt intubated by CW PA, pt remains tachypneic, 80%fiO2, 450, peep5, RR16, 80tube, OG tube, temp sensing orellana in place, awaiting ICU bed, will continue to monitor

## 2020-03-20 NOTE — ED ADULT TRIAGE NOTE - CHIEF COMPLAINT QUOTE
Pt biba and presents to the ED with difficulty speaking, generalized weakness. last known well thursday when he was here for evaluation of dizziness. pt not answering questions at this time. per ems he only was able to tell them his name. md ramos at bedside. pt sent to CT for priority ct Pt biba and presents to the ED with difficulty speaking, generalized weakness. last known well thursday when he was here for evaluation of dizziness. pt not answering questions at this time. per ems he only was able to tell them his name. md ramos at bedside. pt sent to CT for priority CT. fingerstick 90

## 2020-03-20 NOTE — CONSULT NOTE ADULT - SUBJECTIVE AND OBJECTIVE BOX
HPI: Pt sedated and intubated and unable to provide history.  Information obtained from the chart and ER/ICU team  70yMale unknown handed with PMh HTN presents with aphasia and AMS. As per EMS documentation, pt has confusion and generalized weakness, constant since yesterday. The pt was seen here yesterday for dizziness and HTN, discharged, but was able to speak at that time, and woke up altered. Pt was further found to have GTC seizure.  No fevers reported and neck reported to be supple per ER.  No h/o seizure apparently.  Now called for neuro exam.      PAST MEDICAL & SURGICAL HISTORY:  HTN (hypertension)  No significant past surgical history    MEDICATIONS  (STANDING):  piperacillin/tazobactam IVPB. 3.375 Gram(s) IV Intermittent Once  vancomycin  IVPB 1000 milliGRAM(s) IV Intermittent once    MEDICATIONS  (PRN):    Allergies    No Known Allergies    Intolerances        FAMILY HISTORY:          SOCIAL HISTORY:  Denies toxic habits;     REVIEW OF SYSTEMS:    As noted in the HPI.    VITAL SIGNS:  Vital Signs Last 24 Hrs  T(C): 37.2 (20 Mar 2020 10:46), Max: 37.2 (20 Mar 2020 10:46)  T(F): 98.9 (20 Mar 2020 10:46), Max: 98.9 (20 Mar 2020 10:46)  HR: 98 (20 Mar 2020 11:43) (82 - 98)  BP: 146/79 (20 Mar 2020 11:43) (146/79 - 156/75)  BP(mean): --  RR: 24 (20 Mar 2020 11:43) (15 - 26)  SpO2: 100% (20 Mar 2020 11:03) (98% - 100%)    PHYSICAL EXAMINATION:  General: Well-developed, well nourished, intubated.  Eyes: Conjunctiva and sclera clear. Fundoscopic examination was deferred.  Neck: Supple.  Cardiac: +S1 & S2; Regular.  Chest:+ Ronchi b/l.    Musculoskeletal: No tenderness on palpation of spine.  No Kernig's sign.    Neurologic:  - Mental Status:  Intubated/sedated; nonverbal; doesn't follow.  Cranial Nerves II-XII:  + pupil 2mm b/l reactive; + corneal; no facial; + cough on suction.  - Motor:  + spont activity prior to sedation.    - Reflexes:  1+ and symmetric throughout. Plantars WD to noxious.  - Sensory:  WD to noxious.  - Coordination:  Pt unable.   - Gait: Deferred.      LABS:                          12.8   9.82  )-----------( 132      ( 20 Mar 2020 09:30 )             39.4     20 Mar 2020 09:30    138    |  105    |  16.0   ----------------------------<  139    4.7     |  14.0   |  1.42     Ca    8.3        20 Mar 2020 09:30    TPro  7.3    /  Alb  3.3    /  TBili  0.5    /  DBili  x      /  AST  44     /  ALT  23     /  AlkPhos  91     20 Mar 2020 09:30    LIVER FUNCTIONS - ( 20 Mar 2020 09:30 )  Alb: 3.3 g/dL / Pro: 7.3 g/dL / ALK PHOS: 91 U/L / ALT: 23 U/L / AST: 44 U/L / GGT: x           PT/INR - ( 20 Mar 2020 09:30 )   PT: 13.8 sec;   INR: 1.21 ratio         PTT - ( 20 Mar 2020 09:30 )  PTT:34.1 sec      RADIOLOGY & ADDITIONAL STUDIES:      < from: CT Angio Head/Neck w/ IV Cont (03.20.20 @ 11:03) >  CT angiography neck: No hemodynamically significant stenosis of the bilateral cervical ICAs using NASCET criteria.  Patent vertebral arteries.  No evidence of vascular dissection.    CT angiography brain:   1. Moderate stenosis of a proximal right M2 branch. No large vessel occlusion. No evidence of aneurysm.   2.  If clinical suspicion for acute infarct persists, brain MRI can be obtained.    < end of copied text >  < from: CT Head No Cont (03.20.20 @ 06:34) >  No acute intracranial hemorrhage or mass effect.      < from: CT Head No Cont (03.19.20 @ 09:13) >  No acute intracranial findings.

## 2020-03-20 NOTE — ED ADULT NURSE NOTE - CHIEF COMPLAINT QUOTE
Pt biba and presents to the ED with difficulty speaking, generalized weakness. last known well thursday when he was here for evaluation of dizziness. pt not answering questions at this time. per ems he only was able to tell them his name. md ramos at bedside. pt sent to CT for priority CT. fingerstick 90

## 2020-03-20 NOTE — H&P ADULT - PROBLEM SELECTOR PLAN 1
Admit to ICU  Emergently intubated in ER as pt was being admitted  CXR ETT in good position  VAP prophylaxis  Wean fi02 as tolerated  F/U ABG and will adjust vent settings accordingly

## 2020-03-20 NOTE — H&P ADULT - HISTORY OF PRESENT ILLNESS
Pt is a 70 YOM h/o HTN who presented to ED yesterday with dizziness and was discharged.  Pt represents to ED today with AMS, dizziness. Pt is poor historian.  He had a witnessed seizure in ER and received Ativan 4mg IVP.  Upon my arrival in ED pt had bitten his tongue and aspirated on blood.  He was hypoxic and required emergent intubation/see intubation note.  Pt unable to offer any further information.  No family available at this time for more history.

## 2020-03-20 NOTE — ED PROVIDER NOTE - CARE PLAN
Principal Discharge DX:	Seizure  Secondary Diagnosis:	Aspiration pneumonia of both lower lobes, unspecified aspiration pneumonia type  Secondary Diagnosis:	Acute respiratory failure with hypoxia

## 2020-03-20 NOTE — ED PROVIDER NOTE - SECONDARY DIAGNOSIS.
Aspiration pneumonia of both lower lobes, unspecified aspiration pneumonia type Acute respiratory failure with hypoxia

## 2020-03-20 NOTE — ED PROVIDER NOTE - PROGRESS NOTE DETAILS
Called to pt room as pt is seizing Pt with seizures that required 4 mg IM ativan in 2 separate doses of 2 mg  to taylor. Afterwards pt post-ictal, pupils responsive, withdrawing and localizing painful sitmuli, but restless. Additional ativan given in order to obtain IV access CTA head and neck are medically necessary without the pt's consent Pt with sonorous respirations likely due to post-ictal state. on 4L NC pt is 88-90%, on NRB 96%, will monitor Pt remains stuporous, now more tacypneic and labotred, intubated by ICU

## 2020-03-20 NOTE — ED PROVIDER NOTE - OBJECTIVE STATEMENT
70 year old male with PMh HTN presents with aphasia and AMS. As per EMS, pt has confusion and generalized weakness, constant since yesterday. The pt was seen here yesterday for dizziness and HTN, discharged, but was able to speak at that time, and woke up altered. Pt is unable to provide any elements of the hx

## 2020-03-20 NOTE — CONSULT NOTE ADULT - ASSESSMENT
Impression:  New onset seizure now with aspiration.    Plan:    Agree and appreciate ICU care.  Keppra load already given and continue Keppra 500mg q12.  MRI Brain with and without stormy seizure protocol.  EEG to assess seizure.  DVT prophylaxis recommended.  Maintain seizure precautions.  Will follow. Impression:  New onset seizure now with aspiration.    Plan:    Agree and appreciate ICU care.  Keppra load already given and continue Keppra 500mg q12.  MRI Brain with and without stormy seizure protocol.  EEG to assess seizure.  DVT prophylaxis recommended.  Maintain seizure precautions.  D/w ICU team who is admitting the pt.  Will follow.

## 2020-03-20 NOTE — H&P ADULT - PROBLEM SELECTOR PLAN 2
Likely aspirated on blood from tongue biting during seizure  Pt with vague symptoms will be tested for COVID  Zosyn for aspiration

## 2020-03-20 NOTE — ED PROVIDER NOTE - CONSTITUTIONAL, MLM
normal... Well appearing, awake, alert, not answering questions appropriately and in no apparent distress.

## 2020-03-21 LAB
ANION GAP SERPL CALC-SCNC: 15 MMOL/L — SIGNIFICANT CHANGE UP (ref 5–17)
ANISOCYTOSIS BLD QL: SLIGHT — SIGNIFICANT CHANGE UP
BASE EXCESS BLDA CALC-SCNC: -2.3 MMOL/L — LOW (ref -2–2)
BASOPHILS # BLD AUTO: 0 K/UL — SIGNIFICANT CHANGE UP (ref 0–0.2)
BASOPHILS NFR BLD AUTO: 0 % — SIGNIFICANT CHANGE UP (ref 0–2)
BLOOD GAS COMMENTS ARTERIAL: SIGNIFICANT CHANGE UP
BUN SERPL-MCNC: 24 MG/DL — HIGH (ref 8–20)
CALCIUM SERPL-MCNC: 7.3 MG/DL — LOW (ref 8.6–10.2)
CHLORIDE SERPL-SCNC: 105 MMOL/L — SIGNIFICANT CHANGE UP (ref 98–107)
CO2 SERPL-SCNC: 15 MMOL/L — LOW (ref 22–29)
CREAT SERPL-MCNC: 1.66 MG/DL — HIGH (ref 0.5–1.3)
D DIMER BLD IA.RAPID-MCNC: 6243 NG/ML DDU — HIGH
ELLIPTOCYTES BLD QL SMEAR: SLIGHT — SIGNIFICANT CHANGE UP
EOSINOPHIL # BLD AUTO: 0 K/UL — SIGNIFICANT CHANGE UP (ref 0–0.5)
EOSINOPHIL NFR BLD AUTO: 0 % — SIGNIFICANT CHANGE UP (ref 0–6)
FERRITIN SERPL-MCNC: 425 NG/ML — HIGH (ref 30–400)
FIBRINOGEN PPP-MCNC: 584 MG/DL — HIGH (ref 300–520)
GAS PNL BLDA: SIGNIFICANT CHANGE UP
GAS PNL BLDA: SIGNIFICANT CHANGE UP
GLUCOSE SERPL-MCNC: 140 MG/DL — HIGH (ref 70–99)
HCO3 BLDA-SCNC: 22 MMOL/L — SIGNIFICANT CHANGE UP (ref 20–26)
HCT VFR BLD CALC: 35.7 % — LOW (ref 39–50)
HCT VFR BLD CALC: 39.1 % — SIGNIFICANT CHANGE UP (ref 39–50)
HCV AB S/CO SERPL IA: 0.08 S/CO — SIGNIFICANT CHANGE UP (ref 0–0.99)
HCV AB SERPL-IMP: SIGNIFICANT CHANGE UP
HGB BLD-MCNC: 11.9 G/DL — LOW (ref 13–17)
HGB BLD-MCNC: 12.6 G/DL — LOW (ref 13–17)
HOROWITZ INDEX BLDA+IHG-RTO: SIGNIFICANT CHANGE UP
HYPOCHROMIA BLD QL: SLIGHT — SIGNIFICANT CHANGE UP
LDH SERPL L TO P-CCNC: 446 U/L — HIGH (ref 98–192)
LYMPHOCYTES # BLD AUTO: 0.47 K/UL — LOW (ref 1–3.3)
LYMPHOCYTES # BLD AUTO: 5 % — LOW (ref 13–44)
MACROCYTES BLD QL: SLIGHT — SIGNIFICANT CHANGE UP
MAGNESIUM SERPL-MCNC: 2 MG/DL — SIGNIFICANT CHANGE UP (ref 1.6–2.6)
MANUAL SMEAR VERIFICATION: SIGNIFICANT CHANGE UP
MCHC RBC-ENTMCNC: 28.3 PG — SIGNIFICANT CHANGE UP (ref 27–34)
MCHC RBC-ENTMCNC: 28.4 PG — SIGNIFICANT CHANGE UP (ref 27–34)
MCHC RBC-ENTMCNC: 32.2 GM/DL — SIGNIFICANT CHANGE UP (ref 32–36)
MCHC RBC-ENTMCNC: 33.3 GM/DL — SIGNIFICANT CHANGE UP (ref 32–36)
MCV RBC AUTO: 85.2 FL — SIGNIFICANT CHANGE UP (ref 80–100)
MCV RBC AUTO: 87.7 FL — SIGNIFICANT CHANGE UP (ref 80–100)
METAMYELOCYTES # FLD: 3 % — HIGH (ref 0–0)
MICROCYTES BLD QL: SLIGHT — SIGNIFICANT CHANGE UP
MONOCYTES # BLD AUTO: 0.75 K/UL — SIGNIFICANT CHANGE UP (ref 0–0.9)
MONOCYTES NFR BLD AUTO: 8 % — SIGNIFICANT CHANGE UP (ref 2–14)
MYELOCYTES NFR BLD: 4 % — HIGH (ref 0–0)
NEUTROPHILS # BLD AUTO: 7.49 K/UL — HIGH (ref 1.8–7.4)
NEUTROPHILS NFR BLD AUTO: 77 % — SIGNIFICANT CHANGE UP (ref 43–77)
NEUTS BAND # BLD: 3 % — SIGNIFICANT CHANGE UP (ref 0–8)
NRBC # BLD: 0 /100 — SIGNIFICANT CHANGE UP (ref 0–0)
OVALOCYTES BLD QL SMEAR: SLIGHT — SIGNIFICANT CHANGE UP
PCO2 BLDA: 30 MMHG — LOW (ref 35–45)
PH BLDA: 7.45 — SIGNIFICANT CHANGE UP (ref 7.35–7.45)
PHOSPHATE SERPL-MCNC: 5.3 MG/DL — HIGH (ref 2.4–4.7)
PLAT MORPH BLD: NORMAL — SIGNIFICANT CHANGE UP
PLATELET # BLD AUTO: 101 K/UL — LOW (ref 150–400)
PLATELET # BLD AUTO: 107 K/UL — LOW (ref 150–400)
PO2 BLDA: 143 MMHG — HIGH (ref 83–108)
POLYCHROMASIA BLD QL SMEAR: SLIGHT — SIGNIFICANT CHANGE UP
POTASSIUM SERPL-MCNC: 4.7 MMOL/L — SIGNIFICANT CHANGE UP (ref 3.5–5.3)
POTASSIUM SERPL-SCNC: 4.7 MMOL/L — SIGNIFICANT CHANGE UP (ref 3.5–5.3)
PROCALCITONIN SERPL-MCNC: 2.45 NG/ML — HIGH (ref 0.02–0.1)
RAPID RVP RESULT: SIGNIFICANT CHANGE UP
RBC # BLD: 4.19 M/UL — LOW (ref 4.2–5.8)
RBC # BLD: 4.46 M/UL — SIGNIFICANT CHANGE UP (ref 4.2–5.8)
RBC # FLD: 14.7 % — HIGH (ref 10.3–14.5)
RBC # FLD: 14.8 % — HIGH (ref 10.3–14.5)
RBC BLD AUTO: ABNORMAL
SAO2 % BLDA: 98 % — SIGNIFICANT CHANGE UP (ref 95–99)
SCHISTOCYTES BLD QL AUTO: SLIGHT — SIGNIFICANT CHANGE UP
SODIUM SERPL-SCNC: 135 MMOL/L — SIGNIFICANT CHANGE UP (ref 135–145)
WBC # BLD: 8.56 K/UL — SIGNIFICANT CHANGE UP (ref 3.8–10.5)
WBC # BLD: 9.36 K/UL — SIGNIFICANT CHANGE UP (ref 3.8–10.5)
WBC # FLD AUTO: 8.56 K/UL — SIGNIFICANT CHANGE UP (ref 3.8–10.5)
WBC # FLD AUTO: 9.36 K/UL — SIGNIFICANT CHANGE UP (ref 3.8–10.5)

## 2020-03-21 PROCEDURE — 71045 X-RAY EXAM CHEST 1 VIEW: CPT | Mod: 26

## 2020-03-21 PROCEDURE — 95819 EEG AWAKE AND ASLEEP: CPT | Mod: 26

## 2020-03-21 RX ORDER — DEXMEDETOMIDINE HYDROCHLORIDE IN 0.9% SODIUM CHLORIDE 4 UG/ML
0.5 INJECTION INTRAVENOUS
Qty: 200 | Refills: 0 | Status: DISCONTINUED | OUTPATIENT
Start: 2020-03-21 | End: 2020-03-21

## 2020-03-21 RX ORDER — SODIUM CHLORIDE 9 MG/ML
500 INJECTION, SOLUTION INTRAVENOUS ONCE
Refills: 0 | Status: COMPLETED | OUTPATIENT
Start: 2020-03-21 | End: 2020-03-21

## 2020-03-21 RX ADMIN — CHLORHEXIDINE GLUCONATE 15 MILLILITER(S): 213 SOLUTION TOPICAL at 05:04

## 2020-03-21 RX ADMIN — Medication 1 GRAM(S): at 12:14

## 2020-03-21 RX ADMIN — Medication 1 GRAM(S): at 05:04

## 2020-03-21 RX ADMIN — PIPERACILLIN AND TAZOBACTAM 25 GRAM(S): 4; .5 INJECTION, POWDER, LYOPHILIZED, FOR SOLUTION INTRAVENOUS at 15:49

## 2020-03-21 RX ADMIN — DEXMEDETOMIDINE HYDROCHLORIDE IN 0.9% SODIUM CHLORIDE 12.5 MICROGRAM(S)/KG/HR: 4 INJECTION INTRAVENOUS at 09:30

## 2020-03-21 RX ADMIN — LEVETIRACETAM 420 MILLIGRAM(S): 250 TABLET, FILM COATED ORAL at 10:28

## 2020-03-21 RX ADMIN — Medication 1 GRAM(S): at 17:02

## 2020-03-21 RX ADMIN — ENOXAPARIN SODIUM 40 MILLIGRAM(S): 100 INJECTION SUBCUTANEOUS at 12:14

## 2020-03-21 RX ADMIN — LEVETIRACETAM 420 MILLIGRAM(S): 250 TABLET, FILM COATED ORAL at 17:01

## 2020-03-21 RX ADMIN — PIPERACILLIN AND TAZOBACTAM 25 GRAM(S): 4; .5 INJECTION, POWDER, LYOPHILIZED, FOR SOLUTION INTRAVENOUS at 05:04

## 2020-03-21 RX ADMIN — SODIUM CHLORIDE 1000 MILLILITER(S): 9 INJECTION, SOLUTION INTRAVENOUS at 02:00

## 2020-03-21 RX ADMIN — PROPOFOL 25 MICROGRAM(S)/KG/MIN: 10 INJECTION, EMULSION INTRAVENOUS at 05:05

## 2020-03-21 RX ADMIN — CHLORHEXIDINE GLUCONATE 15 MILLILITER(S): 213 SOLUTION TOPICAL at 17:02

## 2020-03-21 RX ADMIN — CHLORHEXIDINE GLUCONATE 1 APPLICATION(S): 213 SOLUTION TOPICAL at 05:05

## 2020-03-21 RX ADMIN — DEXMEDETOMIDINE HYDROCHLORIDE IN 0.9% SODIUM CHLORIDE 12.5 MICROGRAM(S)/KG/HR: 4 INJECTION INTRAVENOUS at 12:15

## 2020-03-21 RX ADMIN — PANTOPRAZOLE SODIUM 40 MILLIGRAM(S): 20 TABLET, DELAYED RELEASE ORAL at 12:14

## 2020-03-21 NOTE — EEG REPORT - NS EEG TEXT BOX
[facility]   COMPREHENSIVE EPILEPSY CENTER   REPORT OF ROUTINE VIDEO EEG     [facility]: [address], [place] [address], Ph#: [phone]  [facility]: [address], [place] [address], Ph#: [phone]  [facility]: [address] [place] [address], Ph#: [phone]    Patient Name: SAGE CAMARA  Age and : 70y ([birth year])  MRN #: [id number]  Location: [place] [address]  Referring Physician: Marlo Chang    Study Date: 20    _____________________________________________________________  TECHNICAL INFORMATION    Placement and Labeling of Electrodes:  The EEG was performed utilizing 20 channels referential EEG connections (coronal over temporal over parasagittal montage) using all standard 10-20 electrode placements with EKG.  Recording was at a sampling rate of 256 samples per second per channel.  Time synchronized digital video recording was done simultaneously with EEG recording.  A low light infrared camera was used for low light recording.  Darius and seizure detection algorithms were utilized.    _____________________________________________________________  HISTORY    Patient is a 70y old  Male who presents with a chief complaint of respiratory failure, seizure, aspiration pna (21 Mar 2020 14:34)      PERTINENT MEDICATION:  levETIRAcetam  IVPB 500 milliGRAM(s) IV Intermittent every 12 hours  propofol Infusion 41.667 MICROgram(s)/kG/Min (25 mL/Hr) IV Continuous <Continuous>    _____________________________________________________________  STUDY INTERPRETATION    Findings: The background was continuous, spontaneously variable and minimally reactive. No posterior dominant rhythm seen.    Background Slowing:  Diffuse theta and polymorphic delta slowing.    Focal Slowing:   None were present.    Sleep Background:  Stage II sleep transients were not recorded.    Other Non-Epileptiform Findings:  None were present.    Interictal Epileptiform Activity:   None were present.    Events:  Clinical events: None recorded.  Seizures: None recorded.    Activation Procedures:   Hyperventilation was not performed.    Photic stimulation was performed and did not elicit any abnormality.     Artifacts:  Intermittent myogenic and movement artifacts were noted.    ECG:  The heart rate on single channel ECG was predominantly between 70-90 BPM.    _____________________________________________________________  EEG SUMMARY/CLASSIFICATION    Abnormal EEG in an altered patient.  - Moderate to severe generalized slowing.    _____________________________________________________________  EEG IMPRESSION/CLINICAL CORRELATE    Abnormal EEG study.  1. Moderate to severe nonspecific diffuse or multifocal cerebral dysfunction.   2. No epileptiform pattern or seizure seen.    _____________________________________________________________    Aislinn Johnson MD  Attending Physician, [facility] Epilepsy [place]

## 2020-03-21 NOTE — PROGRESS NOTE ADULT - ASSESSMENT
Impression:  New onset seizure now with aspiration.    Plan:    Follow per ICU care.  Keppra load already given and continue Keppra 500mg q12.  Await for MRI Brain with and without stormy seizure protocol.  On CEEG to assess seizure.  DVT prophylaxis recommended.  Maintain seizure precautions.  Will follow.

## 2020-03-21 NOTE — PROGRESS NOTE ADULT - ASSESSMENT
71 yo male, PMHx HTN, who presented with altered mental status and dizziness, developed new onset seizures complicated by acute hypoxemic respiratory failure, possible aspiration secondary to tongue biting, r/o severe sepsis secondary to COVID-19 pneumonia vs aspiration with end-organ malperfusion ATN    Full vent support, low Tv vent strategy  Weaned FiO2 to 60%  Obtain ABG, will augment vent to maintain paO2 >70 and pH >7.20  RVP negative   Airborne/Contact precautions ruling out COVID-19.   Empiric antibiotics with Zosyn  Send blood cultures, procalcitonin, CRP, LDH  Keppra for seizures  24hr vEEG  MRI once stabilized   CXR with sizeable atelectasis, repeat CXR to eval for improvement with positive pressure ventilation    Patient's daughter updated via telephone this morning ([phone])

## 2020-03-21 NOTE — PROGRESS NOTE ADULT - SUBJECTIVE AND OBJECTIVE BOX
INTERVAL HISTORY:  Seen at bedside and no new changes overnight.    No Known Allergies      VITAL SIGNS:  Vital Signs Last 24 Hrs  T(C): 38.1 (21 Mar 2020 12:00), Max: 38.6 (20 Mar 2020 18:00)  T(F): 100.6 (21 Mar 2020 12:00), Max: 101.5 (20 Mar 2020 18:00)  HR: 85 (21 Mar 2020 13:03) (82 - 112)  BP: 112/64 (21 Mar 2020 13:00) (105/66 - 169/81)  BP(mean): 82 (21 Mar 2020 13:00) (77 - 106)  RR: 19 (21 Mar 2020 13:00) (16 - 30)  SpO2: 100% (21 Mar 2020 13:03) (90% - 100%)    PHYSICAL EXAMINATION:  General: Well-developed, well nourished, in no acute distress.  Eyes: Conjunctiva and sclera clear.  Neurologic:  - Mental Status:  Intubated/sedated; nonverbal; doesn't follow.  Cranial Nerves II-XII:  + pupil pinpoint b/l reactive; + corneal; no facial; + cough on suction.  - Motor:  No spont activity.   - Reflexes:  1+ and symmetric throughout. Plantars mute.  - Sensory:  WD to noxious minimally.  - Coordination:  Pt unable.   - Gait: Deferred.    MEDS:  MEDICATIONS  (STANDING):  chlorhexidine 0.12% Liquid 15 milliLiter(s) Oral Mucosa two times a day  chlorhexidine 4% Liquid 1 Application(s) Topical <User Schedule>  dexMEDEtomidine Infusion 0.5 MICROgram(s)/kG/Hr (12.5 mL/Hr) IV Continuous <Continuous>  enoxaparin Injectable 40 milliGRAM(s) SubCutaneous daily  levETIRAcetam  IVPB 500 milliGRAM(s) IV Intermittent every 12 hours  pantoprazole  Injectable 40 milliGRAM(s) IV Push daily  piperacillin/tazobactam IVPB.. 3.375 Gram(s) IV Intermittent every 8 hours  propofol Infusion 41.667 MICROgram(s)/kG/Min (25 mL/Hr) IV Continuous <Continuous>  sucralfate suspension 1 Gram(s) Oral every 6 hours    MEDICATIONS  (PRN):      LABS:                          11.9   9.36  )-----------( 101      ( 21 Mar 2020 04:27 )             35.7     03-21    135  |  105  |  24.0<H>  ----------------------------<  140<H>  4.7   |  15.0<L>  |  1.66<H>    Ca    7.3<L>      21 Mar 2020 04:27  Phos  5.3     03-21  Mg     2.0     03-21    TPro  7.3  /  Alb  3.3  /  TBili  0.5  /  DBili  x   /  AST  44<H>  /  ALT  23  /  AlkPhos  91  03-20    LIVER FUNCTIONS - ( 20 Mar 2020 09:30 )  Alb: 3.3 g/dL / Pro: 7.3 g/dL / ALK PHOS: 91 U/L / ALT: 23 U/L / AST: 44 U/L / GGT: x               RADIOLOGY & ADDITIONAL STUDIES:      < from: CT Angio Head/Neck w/ IV Cont (03.20.20 @ 11:03) >  CT angiography neck: No hemodynamically significant stenosis of the bilateral cervical ICAs using NASCET criteria.  Patent vertebral arteries.  No evidence of vascular dissection.    CT angiography brain:   1. Moderate stenosis of a proximal right M2 branch. No large vessel occlusion. No evidence of aneurysm.   2.  If clinical suspicion for acute infarct persists, brain MRI can be obtained.    < end of copied text >  < from: CT Head No Cont (03.20.20 @ 06:34) >  No acute intracranial hemorrhage or mass effect.    < from: CT Head No Cont (03.19.20 @ 09:13) >  No acute intracranial findings.

## 2020-03-21 NOTE — PROGRESS NOTE ADULT - SUBJECTIVE AND OBJECTIVE BOX
Patient is a 70y old  Male who presents with a chief complaint of respiratory failure, seizure, aspiration pna (20 Mar 2020 21:38)      BRIEF HOSPITAL COURSE: From HPI: 70 YOM h/o HTN who presented to ED yesterday with dizziness and was discharged.  Pt represents to ED today with AMS, dizziness. Pt is poor historian.  He had a witnessed seizure in ER and received Ativan 4mg IVP.  Upon my arrival in ED pt had bitten his tongue and aspirated on blood.  He was hypoxic and required emergent intubation/see intubation note.  Pt unable to offer any further information.  No family available at this time for more history.      Events last 24 hours: Intubated on full vent support, hypoxemia improving, weaned to PEEP 5, 40% FIO2      PAST MEDICAL & SURGICAL HISTORY:  HTN (hypertension)  No significant past surgical history        Medications:  piperacillin/tazobactam IVPB.. 3.375 Gram(s) IV Intermittent every 8 hours  levETIRAcetam  IVPB 500 milliGRAM(s) IV Intermittent every 12 hours  propofol Infusion 41.667 MICROgram(s)/kG/Min IV Continuous <Continuous>  enoxaparin Injectable 40 milliGRAM(s) SubCutaneous daily  pantoprazole  Injectable 40 milliGRAM(s) IV Push daily  sucralfate suspension 1 Gram(s) Oral every 6 hours  chlorhexidine 0.12% Liquid 15 milliLiter(s) Oral Mucosa two times a day  chlorhexidine 4% Liquid 1 Application(s) Topical <User Schedule>        Mode: AC/ CMV (Assist Control/ Continuous Mandatory Ventilation)  RR (machine): 18  TV (machine): 400  FiO2: 60  PEEP: 5  MAP: 8  PIP: 16      ICU Vital Signs Last 24 Hrs  T(C): 38 (21 Mar 2020 04:00), Max: 38.6 (20 Mar 2020 18:00)  T(F): 100.4 (21 Mar 2020 04:00), Max: 101.5 (20 Mar 2020 18:00)  HR: 99 (21 Mar 2020 04:00) (82 - 104)  BP: 132/73 (21 Mar 2020 04:00) (105/66 - 169/81)  BP(mean): 97 (21 Mar 2020 04:00) (77 - 102)  ABP: --  ABP(mean): --  RR: 20 (21 Mar 2020 04:00) (15 - 26)  SpO2: 100% (21 Mar 2020 04:00) (98% - 100%)          I&O's Detail    20 Mar 2020 07:01  -  21 Mar 2020 04:45  --------------------------------------------------------  IN:    multiple electrolytes Injection Type 1 Bolus: 500 mL    propofol Infusion: 258 mL  Total IN: 758 mL    OUT:    Indwelling Catheter - Urethral: 495 mL  Total OUT: 495 mL    Total NET: 263 mL            LABS:                        12.6   8.56  )-----------( 107      ( 21 Mar 2020 00:39 )             39.1     03    138  |  105  |  16.0  ----------------------------<  139<H>  4.7   |  14.0<L>  |  1.42<H>    Ca    8.3<L>      20 Mar 2020 09:30    TPro  7.3  /  Alb  3.3  /  TBili  0.5  /  DBili  x   /  AST  44<H>  /  ALT  23  /  AlkPhos  91  03-20      CARDIAC MARKERS ( 20 Mar 2020 09:30 )  x     / <0.01 ng/mL / x     / x     / x      CARDIAC MARKERS ( 19 Mar 2020 08:46 )  x     / <0.01 ng/mL / 134 U/L / x     / x          CAPILLARY BLOOD GLUCOSE      POCT Blood Glucose.: 130 mg/dL (20 Mar 2020 09:01)    PT/INR - ( 20 Mar 2020 09:30 )   PT: 13.8 sec;   INR: 1.21 ratio         PTT - ( 20 Mar 2020 09:30 )  PTT:34.1 sec  Urinalysis Basic - ( 20 Mar 2020 15:13 )    Color: Yellow / Appearance: Clear / S.020 / pH: x  Gluc: x / Ketone: Negative  / Bili: Negative / Urobili: Negative mg/dL   Blood: x / Protein: 100 mg/dL / Nitrite: Negative   Leuk Esterase: Negative / RBC: 3-5 /HPF / WBC 0-2   Sq Epi: x / Non Sq Epi: Negative / Bacteria: Negative      CULTURES:  Rapid RVP Result: NotDetec ( @ 01:24)        Physical Examination:    General: No acute distress.      HEENT: Pupils equal, reactive to light.  Symmetric.    PULM: Clear to auscultation bilaterally, no significant sputum production    CVS: Regular rate and rhythm, no murmurs, rubs, or gallops    ABD: Soft, nondistended, nontender, normoactive bowel sounds, no masses    EXT: No edema, nontender    SKIN: Warm and well perfused, no rashes noted.    NEURO: Alert, oriented, interactive, nonfocal        RADIOLOGY: ***    INVASIVE LINES:  INDWELLING GRANDE:  VTE PROPHYLAXIS:  CAM ICU:  CODE STATUS:    CRITICAL CARE TIME SPENT: *** minutes spent performing frequent bedside reassessments and augmenting plan of care to address problems of acute critical illness that pose high probability of life threatening deterioration and/or end organ damage/dysfunction and discussing goals of care with patient/family, non-inclusive of time spent on procedures performed. Patient is a 70y old  Male who presents with a chief complaint of respiratory failure, seizure, aspiration pna (20 Mar 2020 21:38)      BRIEF HOSPITAL COURSE: From HPI: 70 YOM h/o HTN who presented to ED yesterday with dizziness and was discharged.  Pt represents to ED today with AMS, dizziness. Pt is poor historian.  He had a witnessed seizure in ER and received Ativan 4mg IVP.  Upon my arrival in ED pt had bitten his tongue and aspirated on blood.  He was hypoxic and required emergent intubation/see intubation note.  Pt unable to offer any further information.  No family available at this time for more history.      Events last 24 hours: Intubated on full vent support, hypoxemia improving, weaned to PEEP 5, 60% FIO2. No further obvious seizure activity. Febrile      PAST MEDICAL & SURGICAL HISTORY:  HTN (hypertension)  No significant past surgical history        Medications:  piperacillin/tazobactam IVPB.. 3.375 Gram(s) IV Intermittent every 8 hours  levETIRAcetam  IVPB 500 milliGRAM(s) IV Intermittent every 12 hours  propofol Infusion 41.667 MICROgram(s)/kG/Min IV Continuous <Continuous>  enoxaparin Injectable 40 milliGRAM(s) SubCutaneous daily  pantoprazole  Injectable 40 milliGRAM(s) IV Push daily  sucralfate suspension 1 Gram(s) Oral every 6 hours  chlorhexidine 0.12% Liquid 15 milliLiter(s) Oral Mucosa two times a day  chlorhexidine 4% Liquid 1 Application(s) Topical <User Schedule>        Mode: AC/ CMV (Assist Control/ Continuous Mandatory Ventilation)  RR (machine): 18  TV (machine): 400  FiO2: 60  PEEP: 5  MAP: 8  PIP: 16        ICU Vital Signs Last 24 Hrs  T(C): 38 (21 Mar 2020 04:00), Max: 38.6 (20 Mar 2020 18:00)  T(F): 100.4 (21 Mar 2020 04:00), Max: 101.5 (20 Mar 2020 18:00)  HR: 99 (21 Mar 2020 04:00) (82 - 104)  BP: 132/73 (21 Mar 2020 04:00) (105/66 - 169/81)  BP(mean): 97 (21 Mar 2020 04:00) (77 - 102)  ABP: --  ABP(mean): --  RR: 20 (21 Mar 2020 04:00) (15 - 26)  SpO2: 100% (21 Mar 2020 04:00) (98% - 100%)          I&O's Detail    20 Mar 2020 07:01  -  21 Mar 2020 04:45  --------------------------------------------------------  IN:    multiple electrolytes Injection Type 1 Bolus: 500 mL    propofol Infusion: 258 mL  Total IN: 758 mL    OUT:    Indwelling Catheter - Urethral: 495 mL  Total OUT: 495 mL    Total NET: 263 mL            LABS:                        12.6   8.56  )-----------( 107      ( 21 Mar 2020 00:39 )             39.1     03-20    138  |  105  |  16.0  ----------------------------<  139<H>  4.7   |  14.0<L>  |  1.42<H>    Ca    8.3<L>      20 Mar 2020 09:30    TPro  7.3  /  Alb  3.3  /  TBili  0.5  /  DBili  x   /  AST  44<H>  /  ALT  23  /  AlkPhos  91  03-20      CARDIAC MARKERS ( 20 Mar 2020 09:30 )  x     / <0.01 ng/mL / x     / x     / x      CARDIAC MARKERS ( 19 Mar 2020 08:46 )  x     / <0.01 ng/mL / 134 U/L / x     / x          CAPILLARY BLOOD GLUCOSE      POCT Blood Glucose.: 130 mg/dL (20 Mar 2020 09:01)    PT/INR - ( 20 Mar 2020 09:30 )   PT: 13.8 sec;   INR: 1.21 ratio         PTT - ( 20 Mar 2020 09:30 )  PTT:34.1 sec  Urinalysis Basic - ( 20 Mar 2020 15:13 )    Color: Yellow / Appearance: Clear / S.020 / pH: x  Gluc: x / Ketone: Negative  / Bili: Negative / Urobili: Negative mg/dL   Blood: x / Protein: 100 mg/dL / Nitrite: Negative   Leuk Esterase: Negative / RBC: 3-5 /HPF / WBC 0-2   Sq Epi: x / Non Sq Epi: Negative / Bacteria: Negative      CULTURES:  Rapid RVP Result: NotDetec ( @ 01:24)      RADIOLOGY: < from: Xray Chest 1 View- PORTABLE-Urgent (20 @ 16:24) >     EXAM:  XR CHEST PORTABLE URGENT 1V                          PROCEDURE DATE:  2020      INTERPRETATION:  AP chest on 2020 at 1:39 PM. Patient was intubated.    Heart is magnified by technique.    There are dense atelectatic areas in both lower lung fields which are new since ,  study.    An endotracheal tube and nasogastric tube are presently in place.    IMPRESSION: New sizable atelectatic areas at the lung bases. Tubes in place as above.    KRIS PLATA M.D., ATTENDING RADIOLOGIST  This document has been electronically signed. Mar 20 2020  4:25PM          INVASIVE LINES:  INDWELLING GRANDE: Y   VTE PROPHYLAXIS: Lovenox   CAM ICU: +  CODE STATUS: FULL    CRITICAL CARE TIME SPENT: 34 minutes spent performing frequent bedside reassessments and augmenting plan of care to address problems of acute critical illness that pose high probability of life threatening deterioration and/or end organ damage/dysfunction and discussing goals of care with patient's family, non-inclusive of time spent on procedures performed.

## 2020-03-22 DIAGNOSIS — G93.41 METABOLIC ENCEPHALOPATHY: ICD-10-CM

## 2020-03-22 LAB
-  COAGULASE NEGATIVE STAPHYLOCOCCUS: SIGNIFICANT CHANGE UP
ALBUMIN SERPL ELPH-MCNC: 2.4 G/DL — LOW (ref 3.3–5.2)
ALP SERPL-CCNC: 53 U/L — SIGNIFICANT CHANGE UP (ref 40–120)
ALT FLD-CCNC: 19 U/L — SIGNIFICANT CHANGE UP
ANION GAP SERPL CALC-SCNC: 10 MMOL/L — SIGNIFICANT CHANGE UP (ref 5–17)
AST SERPL-CCNC: 43 U/L — HIGH
BILIRUB SERPL-MCNC: 0.4 MG/DL — SIGNIFICANT CHANGE UP (ref 0.4–2)
BUN SERPL-MCNC: 34 MG/DL — HIGH (ref 8–20)
CALCIUM SERPL-MCNC: 7.4 MG/DL — LOW (ref 8.6–10.2)
CHLORIDE SERPL-SCNC: 104 MMOL/L — SIGNIFICANT CHANGE UP (ref 98–107)
CO2 SERPL-SCNC: 21 MMOL/L — LOW (ref 22–29)
CREAT SERPL-MCNC: 1.46 MG/DL — HIGH (ref 0.5–1.3)
GAS PNL BLDA: SIGNIFICANT CHANGE UP
GLUCOSE SERPL-MCNC: 102 MG/DL — HIGH (ref 70–99)
GRAM STN FLD: SIGNIFICANT CHANGE UP
HCT VFR BLD CALC: 30.7 % — LOW (ref 39–50)
HGB BLD-MCNC: 9.8 G/DL — LOW (ref 13–17)
MAGNESIUM SERPL-MCNC: 2.5 MG/DL — SIGNIFICANT CHANGE UP (ref 1.6–2.6)
MCHC RBC-ENTMCNC: 27.8 PG — SIGNIFICANT CHANGE UP (ref 27–34)
MCHC RBC-ENTMCNC: 31.9 GM/DL — LOW (ref 32–36)
MCV RBC AUTO: 87.2 FL — SIGNIFICANT CHANGE UP (ref 80–100)
METHOD TYPE: SIGNIFICANT CHANGE UP
PHOSPHATE SERPL-MCNC: 3.5 MG/DL — SIGNIFICANT CHANGE UP (ref 2.4–4.7)
PLATELET # BLD AUTO: 91 K/UL — LOW (ref 150–400)
POTASSIUM SERPL-MCNC: 3.6 MMOL/L — SIGNIFICANT CHANGE UP (ref 3.5–5.3)
POTASSIUM SERPL-SCNC: 3.6 MMOL/L — SIGNIFICANT CHANGE UP (ref 3.5–5.3)
PROT SERPL-MCNC: 6.1 G/DL — LOW (ref 6.6–8.7)
RBC # BLD: 3.52 M/UL — LOW (ref 4.2–5.8)
RBC # FLD: 15 % — HIGH (ref 10.3–14.5)
SARS-COV-2 RNA SPEC QL NAA+PROBE: DETECTED
SARS-COV-2 RNA SPEC QL NAA+PROBE: DETECTED
SODIUM SERPL-SCNC: 135 MMOL/L — SIGNIFICANT CHANGE UP (ref 135–145)
SPECIMEN SOURCE: SIGNIFICANT CHANGE UP
WBC # BLD: 10.37 K/UL — SIGNIFICANT CHANGE UP (ref 3.8–10.5)
WBC # FLD AUTO: 10.37 K/UL — SIGNIFICANT CHANGE UP (ref 3.8–10.5)

## 2020-03-22 PROCEDURE — 99291 CRITICAL CARE FIRST HOUR: CPT

## 2020-03-22 PROCEDURE — 95720 EEG PHY/QHP EA INCR W/VEEG: CPT

## 2020-03-22 PROCEDURE — 99223 1ST HOSP IP/OBS HIGH 75: CPT

## 2020-03-22 RX ORDER — ACYCLOVIR SODIUM 500 MG
VIAL (EA) INTRAVENOUS
Refills: 0 | Status: DISCONTINUED | OUTPATIENT
Start: 2020-03-22 | End: 2020-03-23

## 2020-03-22 RX ORDER — VANCOMYCIN HCL 1 G
VIAL (EA) INTRAVENOUS
Refills: 0 | Status: DISCONTINUED | OUTPATIENT
Start: 2020-03-22 | End: 2020-03-22

## 2020-03-22 RX ORDER — ACYCLOVIR SODIUM 500 MG
VIAL (EA) INTRAVENOUS
Refills: 0 | Status: DISCONTINUED | OUTPATIENT
Start: 2020-03-22 | End: 2020-03-22

## 2020-03-22 RX ORDER — ACYCLOVIR SODIUM 500 MG
800 VIAL (EA) INTRAVENOUS ONCE
Refills: 0 | Status: COMPLETED | OUTPATIENT
Start: 2020-03-22 | End: 2020-03-22

## 2020-03-22 RX ORDER — FENTANYL CITRATE 50 UG/ML
0.5 INJECTION INTRAVENOUS
Qty: 2500 | Refills: 0 | Status: DISCONTINUED | OUTPATIENT
Start: 2020-03-22 | End: 2020-03-23

## 2020-03-22 RX ORDER — VANCOMYCIN HCL 1 G
1000 VIAL (EA) INTRAVENOUS EVERY 12 HOURS
Refills: 0 | Status: DISCONTINUED | OUTPATIENT
Start: 2020-03-22 | End: 2020-03-22

## 2020-03-22 RX ORDER — FUROSEMIDE 40 MG
80 TABLET ORAL ONCE
Refills: 0 | Status: COMPLETED | OUTPATIENT
Start: 2020-03-22 | End: 2020-03-22

## 2020-03-22 RX ORDER — ACYCLOVIR SODIUM 500 MG
800 VIAL (EA) INTRAVENOUS EVERY 8 HOURS
Refills: 0 | Status: DISCONTINUED | OUTPATIENT
Start: 2020-03-22 | End: 2020-03-23

## 2020-03-22 RX ORDER — VANCOMYCIN HCL 1 G
1500 VIAL (EA) INTRAVENOUS ONCE
Refills: 0 | Status: DISCONTINUED | OUTPATIENT
Start: 2020-03-22 | End: 2020-03-22

## 2020-03-22 RX ORDER — VANCOMYCIN HCL 1 G
1500 VIAL (EA) INTRAVENOUS ONCE
Refills: 0 | Status: COMPLETED | OUTPATIENT
Start: 2020-03-22 | End: 2020-03-22

## 2020-03-22 RX ORDER — VANCOMYCIN HCL 1 G
100000 VIAL (EA) INTRAVENOUS EVERY 12 HOURS
Refills: 0 | Status: DISCONTINUED | OUTPATIENT
Start: 2020-03-22 | End: 2020-03-22

## 2020-03-22 RX ORDER — VANCOMYCIN HCL 1 G
1250 VIAL (EA) INTRAVENOUS EVERY 24 HOURS
Refills: 0 | Status: DISCONTINUED | OUTPATIENT
Start: 2020-03-23 | End: 2020-03-23

## 2020-03-22 RX ADMIN — Medication 80 MILLIGRAM(S): at 22:34

## 2020-03-22 RX ADMIN — PROPOFOL 25 MICROGRAM(S)/KG/MIN: 10 INJECTION, EMULSION INTRAVENOUS at 05:21

## 2020-03-22 RX ADMIN — LEVETIRACETAM 420 MILLIGRAM(S): 250 TABLET, FILM COATED ORAL at 05:21

## 2020-03-22 RX ADMIN — CHLORHEXIDINE GLUCONATE 15 MILLILITER(S): 213 SOLUTION TOPICAL at 17:37

## 2020-03-22 RX ADMIN — CHLORHEXIDINE GLUCONATE 1 APPLICATION(S): 213 SOLUTION TOPICAL at 05:05

## 2020-03-22 RX ADMIN — PANTOPRAZOLE SODIUM 40 MILLIGRAM(S): 20 TABLET, DELAYED RELEASE ORAL at 11:32

## 2020-03-22 RX ADMIN — ENOXAPARIN SODIUM 40 MILLIGRAM(S): 100 INJECTION SUBCUTANEOUS at 11:32

## 2020-03-22 RX ADMIN — PIPERACILLIN AND TAZOBACTAM 25 GRAM(S): 4; .5 INJECTION, POWDER, LYOPHILIZED, FOR SOLUTION INTRAVENOUS at 00:50

## 2020-03-22 RX ADMIN — LEVETIRACETAM 420 MILLIGRAM(S): 250 TABLET, FILM COATED ORAL at 17:37

## 2020-03-22 RX ADMIN — Medication 1 GRAM(S): at 22:36

## 2020-03-22 RX ADMIN — Medication 1 GRAM(S): at 00:50

## 2020-03-22 RX ADMIN — PIPERACILLIN AND TAZOBACTAM 25 GRAM(S): 4; .5 INJECTION, POWDER, LYOPHILIZED, FOR SOLUTION INTRAVENOUS at 20:52

## 2020-03-22 RX ADMIN — PIPERACILLIN AND TAZOBACTAM 25 GRAM(S): 4; .5 INJECTION, POWDER, LYOPHILIZED, FOR SOLUTION INTRAVENOUS at 05:22

## 2020-03-22 RX ADMIN — Medication 1 GRAM(S): at 05:22

## 2020-03-22 RX ADMIN — Medication 266 MILLIGRAM(S): at 15:00

## 2020-03-22 RX ADMIN — PIPERACILLIN AND TAZOBACTAM 25 GRAM(S): 4; .5 INJECTION, POWDER, LYOPHILIZED, FOR SOLUTION INTRAVENOUS at 15:00

## 2020-03-22 RX ADMIN — PROPOFOL 25 MICROGRAM(S)/KG/MIN: 10 INJECTION, EMULSION INTRAVENOUS at 11:33

## 2020-03-22 RX ADMIN — CHLORHEXIDINE GLUCONATE 15 MILLILITER(S): 213 SOLUTION TOPICAL at 05:23

## 2020-03-22 RX ADMIN — Medication 266 MILLIGRAM(S): at 20:52

## 2020-03-22 RX ADMIN — Medication 1 GRAM(S): at 17:37

## 2020-03-22 RX ADMIN — Medication 1 GRAM(S): at 11:32

## 2020-03-22 RX ADMIN — Medication 300 MILLIGRAM(S): at 03:32

## 2020-03-22 RX ADMIN — FENTANYL CITRATE 5 MICROGRAM(S)/KG/HR: 50 INJECTION INTRAVENOUS at 22:34

## 2020-03-22 RX ADMIN — Medication 266 MILLIGRAM(S): at 05:20

## 2020-03-22 RX ADMIN — PROPOFOL 25 MICROGRAM(S)/KG/MIN: 10 INJECTION, EMULSION INTRAVENOUS at 03:32

## 2020-03-22 NOTE — PROGRESS NOTE ADULT - ATTENDING COMMENTS
I called his Wife Tee Irizarry [phone] at 0150 and left a message  I then called daughter Lorin Li [phone] and asked for permission for lumbar puncture.  She stated she 'works in a hospital' but wouldn't say more and did not give permission for lumbar puncture; wants to speak with her family first and get back to us.

## 2020-03-22 NOTE — PROGRESS NOTE ADULT - SUBJECTIVE AND OBJECTIVE BOX
Patient is a 70y old  Male who presents with a chief complaint of respiratory failure, seizure, aspiration pna (21 Mar 2020 14:34)      BRIEF HOSPITAL COURSE:  70 year old man presented to ED on 3/19 with dizziness and was released; presented on 3/20 with alteration in mental status and had a witnessed seizure, bit tongue, aspirated and was intubated.     Events last 24 hours: continues to have low grade fevers; without sedation all day has been encephalopathic.  Breathing spontaneously but became hypoxic on breathing trial with tachypnea and not following commands.  No seizures noted clinically or on cEEG.    PAST MEDICAL & SURGICAL HISTORY:  HTN (hypertension)  No significant past surgical history        Medications:  piperacillin/tazobactam IVPB.. 3.375 Gram(s) IV Intermittent every 8 hours        levETIRAcetam  IVPB 500 milliGRAM(s) IV Intermittent every 12 hours  propofol Infusion 41.667 MICROgram(s)/kG/Min IV Continuous <Continuous>      enoxaparin Injectable 40 milliGRAM(s) SubCutaneous daily    pantoprazole  Injectable 40 milliGRAM(s) IV Push daily  sucralfate suspension 1 Gram(s) Oral every 6 hours            chlorhexidine 0.12% Liquid 15 milliLiter(s) Oral Mucosa two times a day  chlorhexidine 4% Liquid 1 Application(s) Topical <User Schedule>        Mode: CPAP with PS  FiO2: 40  PEEP: 5  PS: 10  MAP: 9      ICU Vital Signs Last 24 Hrs  T(C): 37.9 (21 Mar 2020 16:00), Max: 38.1 (21 Mar 2020 08:00)  T(F): 100.2 (21 Mar 2020 16:00), Max: 100.6 (21 Mar 2020 08:00)  HR: 86 (21 Mar 2020 23:42) (72 - 112)  BP: 142/78 (21 Mar 2020 21:00) (99/62 - 146/82)  BP(mean): 102 (21 Mar 2020 21:00) (75 - 106)  ABP: --  ABP(mean): --  RR: 17 (21 Mar 2020 21:00) (16 - 30)  SpO2: 100% (21 Mar 2020 23:42) (90% - 100%)      ABG - ( 21 Mar 2020 16:00 )  pH, Arterial: 7.45  pH, Blood: x     /  pCO2: 30    /  pO2: 143   / HCO3: 22    / Base Excess: -2.3  /  SaO2: 98                  I&O's Detail    20 Mar 2020 07:01  -  21 Mar 2020 07:00  --------------------------------------------------------  IN:    multiple electrolytes Injection Type 1 Bolus: 500 mL    propofol Infusion: 306 mL    Solution: 100 mL    Solution: 175 mL  Total IN: 1081 mL    OUT:    Indwelling Catheter - Urethral: 560 mL  Total OUT: 560 mL    Total NET: 521 mL      21 Mar 2020 07:01  -  22 Mar 2020 01:38  --------------------------------------------------------  IN:    dexmedetomidine Infusion: 89.5 mL    NSModularFluidAdult: 900 mL    Solution: 100 mL    Solution: 100 mL  Total IN: 1189.5 mL    OUT:    Indwelling Catheter - Urethral: 450 mL  Total OUT: 450 mL    Total NET: 739.5 mL            LABS:                        11.9   9.36  )-----------( 101      ( 21 Mar 2020 04:27 )             35.7     03-21    135  |  105  |  24.0<H>  ----------------------------<  140<H>  4.7   |  15.0<L>  |  1.66<H>    Ca    7.3<L>      21 Mar 2020 04:27  Phos  5.3     03-21  Mg     2.0     -21    TPro  7.3  /  Alb  3.3  /  TBili  0.5  /  DBili  x   /  AST  44<H>  /  ALT  23  /  AlkPhos  91  03-20      CARDIAC MARKERS ( 20 Mar 2020 09:30 )  x     / <0.01 ng/mL / x     / x     / x          CAPILLARY BLOOD GLUCOSE      POCT Blood Glucose.: 130 mg/dL (20 Mar 2020 09:01)    PT/INR - ( 20 Mar 2020 09:30 )   PT: 13.8 sec;   INR: 1.21 ratio         PTT - ( 20 Mar 2020 09:30 )  PTT:34.1 sec  Urinalysis Basic - ( 20 Mar 2020 15:13 )    Color: Yellow / Appearance: Clear / S.020 / pH: x  Gluc: x / Ketone: Negative  / Bili: Negative / Urobili: Negative mg/dL   Blood: x / Protein: 100 mg/dL / Nitrite: Negative   Leuk Esterase: Negative / RBC: 3-5 /HPF / WBC 0-2   Sq Epi: x / Non Sq Epi: Negative / Bacteria: Negative      CULTURES:  Rapid RVP Result: NotDetec ( @ 01:24)      Physical Examination:    General: No acute distress.      HEENT: Pupils equal, sluggishly reactive to light.  Symmetric.    PULM: Coarse bilaterally    NECK: Supple, no lymphadenopathy, trachea midline    CVS: Regular rate and rhythm, no murmurs, rubs, or gallops    ABD: Soft, nondistended, nontender, normoactive bowel sounds, no masses    EXT: No edema, nontender    SKIN: Warm and well perfused, no rashes noted.    NEURO: moving all 4 extremities purposefully    DEVICES:     RADIOLOGY: reviewed    CRITICAL CARE TIME SPENT: 45

## 2020-03-22 NOTE — EEG REPORT - NS EEG TEXT BOX
[facility]   COMPREHENSIVE EPILEPSY CENTER   REPORT OF LONG-TERM VIDEO EEG     [facility]: [address], [place] [address]  [address]: [phone] [address]  [facility]: [address] [place] [address]    Patient Name: SAGE Lozano and : 70y ([birth year])  MRN #: [id number]  Location: [place] [address]  Referring Physician: Marlo Chang    Start Time/Date: 14:25 on 20  End Time/Date: 08:00 on 20  Duration: 17hr 34m    _____________________________________________________________  STUDY INFORMATION    EEG Recording Technique:  The patient underwent continuous Video-EEG monitoring, using Telemetry System hardware on the XLTek Digital System. EEG and video data were stored on a computer hard drive with important events saved in digital archive files. The material was reviewed by a physician (electroencephalographer / epileptologist) on a daily basis. Darius and seizure detection algorithms were utilized and reviewed. An EEG Technician attended to the patient, and was available throughout daytime work hours.  The epilepsy center neurologist was available in person or on call 24-hours per day.    EEG Placement and Labeling of Electrodes:  The EEG was performed utilizing 20 channel referential EEG connections (coronal over temporal over parasagittal montage) using all standard 10-20 electrode placements with EKG, with additional electrodes placed in the inferior temporal region using the modified 10-10 montage electrode placements for elective admissions, or if deemed necessary. Recording was at a sampling rate of 256 samples per second per channel. Time synchronized digital video recording was done simultaneously with EEG recording. A low light infrared camera was used for low light recording.     _____________________________________________________________  HISTORY    Patient is a 70y old  Male who presents with a chief complaint of respiratory failure, seizure, aspiration pna (22 Mar 2020 01:38)      PERTINENT MEDICATION:  levETIRAcetam  IVPB 500 milliGRAM(s) IV Intermittent every 12 hours  propofol Infusion 41.667 MICROgram(s)/kG/Min (25 mL/Hr) IV Continuous <Continuous>    _____________________________________________________________  STUDY INTERPRETATION    Findings: The background was continuous, spontaneously variable and reactive. No posterior dominant rhythm seen.    Background Slowing:  Diffuse theta and polymorphic delta slowing.    Focal Slowing:   None were present.    Sleep Background:  Stage II sleep transients were not recorded.    Other Non-Epileptiform Findings:  None were present.    Interictal Epileptiform Activity:   None were present.    Events:  Clinical events: None recorded.  Seizures: None recorded.    Activation Procedures:   Hyperventilation was not performed.    Photic stimulation was performed and did not elicit any abnormality.     Artifacts:  Intermittent myogenic and movement artifacts were noted.    ECG:  The heart rate on single channel ECG was predominantly between 70-90 BPM.    _____________________________________________________________  EEG SUMMARY/CLASSIFICATION    Abnormal EEG in an altered patient.  - Moderate to severe generalized slowing.    _____________________________________________________________  EEG IMPRESSION/CLINICAL CORRELATE    Abnormal EEG study.  1. Moderate to severe nonspecific diffuse or multifocal cerebral dysfunction.   2. No epileptiform pattern or seizure seen.    _____________________________________________________________    Aislinn Johnson MD  Attending Physician, [facility] [place]

## 2020-03-22 NOTE — DIETITIAN INITIAL EVALUATION ADULT. - PERTINENT LABORATORY DATA
03-22 Na135 mmol/L Glu 102 mg/dL<H> K+ 3.6 mmol/L Cr  1.46 mg/dL<H> BUN 34.0 mg/dL<H> Phos 3.5 mg/dL Alb 2.4 g/dL<L> PAB n/a

## 2020-03-22 NOTE — DIETITIAN INITIAL EVALUATION ADULT. - PERTINENT MEDS FT
MEDICATIONS  (STANDING):  acyclovir IVPB 800 milliGRAM(s) IV Intermittent every 8 hours  acyclovir IVPB      chlorhexidine 0.12% Liquid 15 milliLiter(s) Oral Mucosa two times a day  chlorhexidine 4% Liquid 1 Application(s) Topical <User Schedule>  enoxaparin Injectable 40 milliGRAM(s) SubCutaneous daily  levETIRAcetam  IVPB 500 milliGRAM(s) IV Intermittent every 12 hours  pantoprazole  Injectable 40 milliGRAM(s) IV Push daily  piperacillin/tazobactam IVPB.. 3.375 Gram(s) IV Intermittent every 8 hours  propofol Infusion 41.667 MICROgram(s)/kG/Min (25 mL/Hr) IV Continuous <Continuous>  sucralfate suspension 1 Gram(s) Oral every 6 hours    MEDICATIONS  (PRN):

## 2020-03-22 NOTE — CONSULT NOTE ADULT - SUBJECTIVE AND OBJECTIVE BOX
[facility]  INFECTIOUS DISEASES AND INTERNAL MEDICINE at [place]  =======================================================  Collins Casarez MD  Diplomates American Board of Internal Medicine and Infectious Diseases  Tel: [phone]      Fax: [phone]  =======================================================      N-[id number]  SAGE CAMARA     History obtained from the Chart. Patient not able to provide history.     CC: Patient is a 70y old  Male who presents with a chief complaint of respiratory failure, seizure, aspiration pna (22 Mar 2020 01:38)    71y/o  Male with h/o HTN. Patient presented to ED 3/20 with dizziness and was discharged after evaluation in the ER. He returned to the ER 3/20 with AMS, and had a seizure episode in the ER. Patient was given ativan and during the seizure had a bite to this tongue and aspirated blood.  He was hypoxic and required emergent intubation. Patient was febrile in the ER no leukocytosis. He was admitted to MICU, COVID PCR was sent and is positive. Patient also started on Acyclovir for ? encephalitis given seizure episode. ID input requested.       Past Medical & Surgical Hx:  HTN (hypertension)  No significant past surgical history      Social Hx:  Unable to obtain due to medical condition       FAMILY HISTORY:  Unable to obtain due to medical condition       Allergies  No Known Allergies      Antimicrobials:  acyclovir IVPB 800 milliGRAM(s) IV Intermittent every 8 hours  piperacillin/tazobactam IVPB.. 3.375 Gram(s) IV Intermittent every 8 hours       REVIEW OF SYSTEMS:  Unable to obtain due to medical condition       Physical Exam:  Vital Signs Last 24 Hrs  T(C): 36.7 (22 Mar 2020 06:00), Max: 38.1 (21 Mar 2020 12:00)  T(F): 98.1 (22 Mar 2020 06:00), Max: 100.6 (21 Mar 2020 12:00)  HR: 96 (22 Mar 2020 11:30) (72 - 100)  BP: 115/70 (22 Mar 2020 08:00) (96/57 - 142/78)  BP(mean): 87 (22 Mar 2020 08:00) (72 - 102)  RR: 18 (22 Mar 2020 11:30) (14 - 25)  SpO2: 100% (22 Mar 2020 11:30) (96% - 100%)      GEN: Sedated on vent  HEENT: normocephalic and atraumatic. EOMI. PERRL.  Anicteric  NECK: Supple.   LUNGS: Course BS B/L  HEART: Regular rate and rhythm   ABDOMEN: Soft, nontender, and nondistended.  Positive bowel sounds.    : No CVA tenderness  EXTREMITIES: Without any edema.  MSK: No joint swelling  NEUROLOGIC: Sedated on vent  PSYCHIATRIC: Sedated on vent  SKIN: No Rash      Labs:      135  |  104  |  34.0<H>  ----------------------------<  102<H>  3.6   |  21.0<L>  |  1.46<H>    Ca    7.4<L>      22 Mar 2020 10:54  Phos  3.5     03-22  Mg     2.5     -22    TPro  6.1<L>  /  Alb  2.4<L>  /  TBili  0.4  /  DBili  x   /  AST  43<H>  /  ALT  19  /  AlkPhos  53  03-22                          9.8    10.37 )-----------( 91       ( 22 Mar 2020 06:39 )             30.7       Urinalysis Basic - ( 20 Mar 2020 15:13 )    Color: Yellow / Appearance: Clear / S.020 / pH: x  Gluc: x / Ketone: Negative  / Bili: Negative / Urobili: Negative mg/dL   Blood: x / Protein: 100 mg/dL / Nitrite: Negative   Leuk Esterase: Negative / RBC: 3-5 /HPF / WBC 0-2   Sq Epi: x / Non Sq Epi: Negative / Bacteria: Negative      LIVER FUNCTIONS - ( 22 Mar 2020 10:54 )  Alb: 2.4 g/dL / Pro: 6.1 g/dL / ALK PHOS: 53 U/L / ALT: 19 U/L / AST: 43 U/L / GGT: x             ABG - ( 21 Mar 2020 16:00 )  pH, Arterial: 7.45  pH, Blood: x     /  pCO2: 30    /  pO2: 143   / HCO3: 22    / Base Excess: -2.3  /  SaO2: 98        Procalcitonin, Serum: 2.45 ng/mL (20 @ 04:29)      RECENT CULTURES:   @ 17:22 .Blood Blood Culture PCR    Growth in anaerobic bottle: Gram Positive Cocci in Clusters  Anaerobic Bottle: 14:46 Hours to positivity  Aerobic Bottle: No growth to date  ***Blood Panel PCR results on this specimen are available  approximately 3 hours after the Gram stain result.***  Gram stain, PCR, and/or culture results may not always  correspond due to difference in methodologies.  ************************************************************  This PCR assay was performed using [employer].  The following targets are tested for: Enterococcus,  vancomycin resistant enterococci, Listeria monocytogenes,  coagulase negative staphylococci, S. aureus,  methicillin resistant S. aureus, Streptococcus agalactiae  (Group B), S. pneumoniae, S. pyogenes (Group A),  Acinetobacter baumannii, Enterobacter cloacae, E. coli,  Klebsiella oxytoca, K. pneumoniae, Proteus sp.,  Serratia marcescens, Haemophilus influenzae,  Neisseria meningitidis, Pseudomonas aeruginosa, Candida  albicans, C. glabrata, C krusei, C parapsilosis,  C. tropicalis and the KPC resistance gene.       @ 01:24    RVP  NotDetec        < from: Xray Chest 1 View- PORTABLE-Routine (20 @ 04:42) >  EXAM:  XR CHEST PORTABLE ROUTINE 1V                          PROCEDURE DATE:  2020        INTERPRETATION:  Portable chest radiograph        CLINICAL INFORMATION:   Intubation. Follow-up. Pneumonia.    TECHNIQUE:  Portable  AP view of the chest was obtained.    COMPARISON: 3/20/2020 available for review.    FINDINGS:   The lungs  show resolving RIGHT lower lobe airspace consolidation/atelectasis. Remaining lung parenchyma clear.   ET tube tip above tracheal bifurcation.  NG tube tip beyond GE junction.    The  heart is enlarged in transverse diameter. No hilar mass. Trachea midline.      Visualized osseous structures are intact.      IMPRESSION:   Resolving RIGHT lower lobe pneumonia..  ET tube tip above tracheal bifurcation.  NG tube tip beyond GE junction.    < end of copied text >

## 2020-03-22 NOTE — CONSULT NOTE ADULT - ASSESSMENT
71y/o  Male with h/o HTN. Patient presented to ED 3/20 with dizziness and was discharged after evaluation in the ER. He returned to the ER 3/20 with AMS, and had a seizure episode in the ER. Patient was given ativan and during the seizure had a bite to this tongue and aspirated blood.  He was hypoxic and required emergent intubation. Patient was febrile in the ER no leukocytosis. He was admitted to MICU, COVID PCR was sent and is positive. Patient also started on Acyclovir for ? encephalitis given seizure episode.       COVID 19  Acute hypoxic respiratory failure  Seizure episode  AMS  encephalopathy   Positive blood culture  ODESSA    - Blood cultures with CoNS in 1 of 4 bottles, likely contamination  - COVID 19 +  - RVP negative  - CXR with rt sided pneumonia  - UA with no UTI  - Procalcitonin level 2.45  - Continue Zosyn  - Continue Vancomycin  - Monitor trough  - Will continue Acyclovir for possible encephalitis until further work up can be done  - Trend Fever  - Trend Leukocytosis      Will Follow

## 2020-03-22 NOTE — DIETITIAN INITIAL EVALUATION ADULT. - OTHER INFO
Per MD note:  71y/o  Male with h/o HTN. Patient presented to ED 3/20 with dizziness and was discharged after evaluation in the ER. He returned to the ER 3/20 with AMS, and had a seizure episode in the ER. Patient was given ativan and during the seizure had a bite to this tongue and aspirated blood.  He was hypoxic and required emergent intubation. Patient was febrile in the ER no leukocytosis. He was admitted to MICU, COVID PCR was sent and is positive. Patient also started on Acyclovir for ? encephalitis given seizure episode.  Pt with generalized edema noted

## 2020-03-22 NOTE — DIETITIAN INITIAL EVALUATION ADULT. - ENTERAL
Start Pivot 1.5 at 50 ml / hr , increase by 15 ml q 4 hrs to goal of 80ml/hr ( 1600ml, 2400 kcal, 150 g pro, 1216 ml free water)

## 2020-03-23 LAB
-  AMPICILLIN/SULBACTAM: SIGNIFICANT CHANGE UP
-  CEFAZOLIN: SIGNIFICANT CHANGE UP
-  CLINDAMYCIN: SIGNIFICANT CHANGE UP
-  ERYTHROMYCIN: SIGNIFICANT CHANGE UP
-  GENTAMICIN: SIGNIFICANT CHANGE UP
-  OXACILLIN: SIGNIFICANT CHANGE UP
-  PENICILLIN: SIGNIFICANT CHANGE UP
-  RIFAMPIN: SIGNIFICANT CHANGE UP
-  TETRACYCLINE: SIGNIFICANT CHANGE UP
-  TRIMETHOPRIM/SULFAMETHOXAZOLE: SIGNIFICANT CHANGE UP
-  VANCOMYCIN: SIGNIFICANT CHANGE UP
ALBUMIN SERPL ELPH-MCNC: 2.1 G/DL — LOW (ref 3.3–5.2)
ALP SERPL-CCNC: 62 U/L — SIGNIFICANT CHANGE UP (ref 40–120)
ALT FLD-CCNC: 23 U/L — SIGNIFICANT CHANGE UP
ANION GAP SERPL CALC-SCNC: 15 MMOL/L — SIGNIFICANT CHANGE UP (ref 5–17)
ANISOCYTOSIS BLD QL: SLIGHT — SIGNIFICANT CHANGE UP
APPEARANCE CSF: CLEAR — SIGNIFICANT CHANGE UP
AST SERPL-CCNC: 51 U/L — HIGH
BASOPHILS # BLD AUTO: 0 K/UL — SIGNIFICANT CHANGE UP (ref 0–0.2)
BASOPHILS NFR BLD AUTO: 0 % — SIGNIFICANT CHANGE UP (ref 0–2)
BILIRUB SERPL-MCNC: 0.4 MG/DL — SIGNIFICANT CHANGE UP (ref 0.4–2)
BUN SERPL-MCNC: 29 MG/DL — HIGH (ref 8–20)
CALCIUM SERPL-MCNC: 7.5 MG/DL — LOW (ref 8.6–10.2)
CHLORIDE SERPL-SCNC: 100 MMOL/L — SIGNIFICANT CHANGE UP (ref 98–107)
CO2 SERPL-SCNC: 20 MMOL/L — LOW (ref 22–29)
COLOR CSF: SIGNIFICANT CHANGE UP
CREAT SERPL-MCNC: 1.42 MG/DL — HIGH (ref 0.5–1.3)
CSF PCR RESULT: SIGNIFICANT CHANGE UP
DACRYOCYTES BLD QL SMEAR: SLIGHT — SIGNIFICANT CHANGE UP
EOSINOPHIL # BLD AUTO: 0.11 K/UL — SIGNIFICANT CHANGE UP (ref 0–0.5)
EOSINOPHIL NFR BLD AUTO: 0.9 % — SIGNIFICANT CHANGE UP (ref 0–6)
GAS PNL BLDA: SIGNIFICANT CHANGE UP
GIANT PLATELETS BLD QL SMEAR: PRESENT — SIGNIFICANT CHANGE UP
GLUCOSE CSF-MCNC: 77 MG/DLG/24H — HIGH (ref 40–70)
GLUCOSE SERPL-MCNC: 177 MG/DL — HIGH (ref 70–99)
GRAM STN FLD: SIGNIFICANT CHANGE UP
HCT VFR BLD CALC: 31.4 % — LOW (ref 39–50)
HGB BLD-MCNC: 10.4 G/DL — LOW (ref 13–17)
LABORATORY COMMENT REPORT: SIGNIFICANT CHANGE UP
LYMPHOCYTES # BLD AUTO: 0.21 K/UL — LOW (ref 1–3.3)
LYMPHOCYTES # BLD AUTO: 1.8 % — LOW (ref 13–44)
MACROCYTES BLD QL: SLIGHT — SIGNIFICANT CHANGE UP
MAGNESIUM SERPL-MCNC: 2.4 MG/DL — SIGNIFICANT CHANGE UP (ref 1.6–2.6)
MANUAL SMEAR VERIFICATION: SIGNIFICANT CHANGE UP
MCHC RBC-ENTMCNC: 28.7 PG — SIGNIFICANT CHANGE UP (ref 27–34)
MCHC RBC-ENTMCNC: 33.1 GM/DL — SIGNIFICANT CHANGE UP (ref 32–36)
MCV RBC AUTO: 86.5 FL — SIGNIFICANT CHANGE UP (ref 80–100)
METHOD TYPE: SIGNIFICANT CHANGE UP
MICROCYTES BLD QL: SLIGHT — SIGNIFICANT CHANGE UP
MONOCYTES # BLD AUTO: 1.14 K/UL — HIGH (ref 0–0.9)
MONOCYTES NFR BLD AUTO: 9.6 % — SIGNIFICANT CHANGE UP (ref 2–14)
NEUTROPHILS # BLD AUTO: 10.38 K/UL — HIGH (ref 1.8–7.4)
NEUTROPHILS # CSF: SIGNIFICANT CHANGE UP % (ref 0–6)
NEUTROPHILS NFR BLD AUTO: 76.3 % — SIGNIFICANT CHANGE UP (ref 43–77)
NEUTS BAND # BLD: 11.4 % — HIGH (ref 0–8)
NRBC NFR CSF: 5 /UL — SIGNIFICANT CHANGE UP (ref 0–5)
PHOSPHATE SERPL-MCNC: 3.8 MG/DL — SIGNIFICANT CHANGE UP (ref 2.4–4.7)
PLAT MORPH BLD: NORMAL — SIGNIFICANT CHANGE UP
PLATELET # BLD AUTO: 114 K/UL — LOW (ref 150–400)
POTASSIUM SERPL-MCNC: 4.2 MMOL/L — SIGNIFICANT CHANGE UP (ref 3.5–5.3)
POTASSIUM SERPL-SCNC: 4.2 MMOL/L — SIGNIFICANT CHANGE UP (ref 3.5–5.3)
PROT CSF-MCNC: 143 MG/DL — HIGH (ref 15–45)
PROT SERPL-MCNC: 6.1 G/DL — LOW (ref 6.6–8.7)
RBC # BLD: 3.63 M/UL — LOW (ref 4.2–5.8)
RBC # CSF: 156 /CMM — HIGH (ref 0–1)
RBC # FLD: 14.5 % — SIGNIFICANT CHANGE UP (ref 10.3–14.5)
RBC BLD AUTO: ABNORMAL
SCHISTOCYTES BLD QL AUTO: SLIGHT — SIGNIFICANT CHANGE UP
SODIUM SERPL-SCNC: 135 MMOL/L — SIGNIFICANT CHANGE UP (ref 135–145)
SOURCE HSV 1/2: SIGNIFICANT CHANGE UP
SPECIMEN SOURCE: SIGNIFICANT CHANGE UP
TUBE TYPE: SIGNIFICANT CHANGE UP
WBC # BLD: 11.84 K/UL — HIGH (ref 3.8–10.5)
WBC # FLD AUTO: 11.84 K/UL — HIGH (ref 3.8–10.5)

## 2020-03-23 PROCEDURE — 99291 CRITICAL CARE FIRST HOUR: CPT

## 2020-03-23 PROCEDURE — 88189 FLOWCYTOMETRY/READ 16 & >: CPT

## 2020-03-23 PROCEDURE — 93971 EXTREMITY STUDY: CPT | Mod: 26,LT

## 2020-03-23 PROCEDURE — 99232 SBSQ HOSP IP/OBS MODERATE 35: CPT

## 2020-03-23 RX ORDER — DEXMEDETOMIDINE HYDROCHLORIDE IN 0.9% SODIUM CHLORIDE 4 UG/ML
0.3 INJECTION INTRAVENOUS
Qty: 200 | Refills: 0 | Status: DISCONTINUED | OUTPATIENT
Start: 2020-03-23 | End: 2020-03-24

## 2020-03-23 RX ADMIN — PIPERACILLIN AND TAZOBACTAM 25 GRAM(S): 4; .5 INJECTION, POWDER, LYOPHILIZED, FOR SOLUTION INTRAVENOUS at 04:55

## 2020-03-23 RX ADMIN — Medication 166.67 MILLIGRAM(S): at 04:50

## 2020-03-23 RX ADMIN — LEVETIRACETAM 420 MILLIGRAM(S): 250 TABLET, FILM COATED ORAL at 17:01

## 2020-03-23 RX ADMIN — DEXMEDETOMIDINE HYDROCHLORIDE IN 0.9% SODIUM CHLORIDE 7.5 MICROGRAM(S)/KG/HR: 4 INJECTION INTRAVENOUS at 21:23

## 2020-03-23 RX ADMIN — PIPERACILLIN AND TAZOBACTAM 25 GRAM(S): 4; .5 INJECTION, POWDER, LYOPHILIZED, FOR SOLUTION INTRAVENOUS at 21:18

## 2020-03-23 RX ADMIN — Medication 266 MILLIGRAM(S): at 04:56

## 2020-03-23 RX ADMIN — PANTOPRAZOLE SODIUM 40 MILLIGRAM(S): 20 TABLET, DELAYED RELEASE ORAL at 13:06

## 2020-03-23 RX ADMIN — Medication 1 GRAM(S): at 17:01

## 2020-03-23 RX ADMIN — LEVETIRACETAM 420 MILLIGRAM(S): 250 TABLET, FILM COATED ORAL at 04:56

## 2020-03-23 RX ADMIN — Medication 1 GRAM(S): at 23:24

## 2020-03-23 RX ADMIN — ENOXAPARIN SODIUM 40 MILLIGRAM(S): 100 INJECTION SUBCUTANEOUS at 11:30

## 2020-03-23 RX ADMIN — Medication 266 MILLIGRAM(S): at 21:18

## 2020-03-23 RX ADMIN — Medication 1 GRAM(S): at 04:55

## 2020-03-23 RX ADMIN — CHLORHEXIDINE GLUCONATE 15 MILLILITER(S): 213 SOLUTION TOPICAL at 04:55

## 2020-03-23 RX ADMIN — DEXMEDETOMIDINE HYDROCHLORIDE IN 0.9% SODIUM CHLORIDE 7.5 MICROGRAM(S)/KG/HR: 4 INJECTION INTRAVENOUS at 18:01

## 2020-03-23 RX ADMIN — CHLORHEXIDINE GLUCONATE 15 MILLILITER(S): 213 SOLUTION TOPICAL at 17:01

## 2020-03-23 RX ADMIN — PIPERACILLIN AND TAZOBACTAM 25 GRAM(S): 4; .5 INJECTION, POWDER, LYOPHILIZED, FOR SOLUTION INTRAVENOUS at 13:16

## 2020-03-23 RX ADMIN — CHLORHEXIDINE GLUCONATE 1 APPLICATION(S): 213 SOLUTION TOPICAL at 04:54

## 2020-03-23 RX ADMIN — Medication 266 MILLIGRAM(S): at 14:00

## 2020-03-23 RX ADMIN — Medication 1 GRAM(S): at 11:29

## 2020-03-23 RX ADMIN — FENTANYL CITRATE 5 MICROGRAM(S)/KG/HR: 50 INJECTION INTRAVENOUS at 08:00

## 2020-03-23 NOTE — PROGRESS NOTE ADULT - ASSESSMENT
Patient is a 70 year old male with a pmhx of HTn who presents to [facility] on 3/20 for dizziness, found to have:    1. Acute hypoxic resp failure   2. COVID +  3. Aspiration PNA  4. r/o encephalitis   5. new onset seizures   6. CKD    Plan:  Neuro: Presenting with fever, AMS and new onset seizure. Cont keppra  Cardio:  Pulm:  GI:  Renal:  ID:  Heme:  endo:      Dispo: remains in ICU, will CPAP when switched to precedex Patient is a 70 year old male with a pmhx of HTn who presents to [facility] on 3/20 for dizziness, found to have:    1. Acute hypoxic resp failure   2. COVID +  3. Aspiration PNA  4. r/o encephalitis   5. new onset seizures   6. CKD    Plan:  Neuro: Presenting with fever, AMS and new onset seizure. Cont keppra 500mg q12. Was off sedation and CPAP this morning, able to follow commands however became agitated during echo. placed back on fentanyl drip. Will switch to precedex and CPAP as able. EEG without episodes of seizures.   Cardio: Support MAP, titrate to 65 or greater   Pulm: Remains vented, VAC 24/300/10/4. abg acceptable. vent bundle in place, will titrate as able. daily CPAP with sedation vacation.  GI: cont tube feeds. PPI  Renal: Renally dose meds, strict I/Os, avoid nephrotoxic meds   ID: Will cont with broad spectrum abx (vanco, zosyn as well as acyclovir) until LP can be done to r/u encephalitis. follow up blood cultures. Will need MRI eventually. Will defer for now in setting of COVID +  Heme: Lovenox and Scd for DVT ppx  endo: No active issues       Dispo: remains in ICU, will CPAP when switched to precedex Patient is a 70 year old male with a pmhx of HTn who presents to [facility] on 3/20 for dizziness, found to have:    1. Acute hypoxic resp failure   2. COVID +  3. Aspiration PNA  4. r/o encephalitis   5. new onset seizures   6. CKD    Plan:  Neuro: Presenting with fever, AMS and new onset seizure. Cont keppra 500mg q12. Was off sedation and CPAP this morning, able to follow commands however became agitated during echo. placed back on fentanyl drip. Will switch to precedex and CPAP as able. EEG without episodes of seizures.   Cardio: Support MAP, titrate to 65 or greater   Pulm: Remains vented, VAC 24/300/10/4. abg acceptable. vent bundle in place, will titrate as able. daily CPAP with sedation vacation.  GI: cont tube feeds. PPI  Renal: Renally dose meds, strict I/Os, avoid nephrotoxic meds   ID: Will cont with broad spectrum abx (vanco, zosyn as well as acyclovir) until LP can be done to r/u encephalitis. follow up blood cultures. Will need MRI eventually. Will defer for now in setting of COVID +  Heme: Lovenox and Scd for DVT ppx  endo: No active issues       Dispo: remains in ICU, will CPAP when switched to precedex. updated daughter Lorin Li [phone] via phone

## 2020-03-23 NOTE — PROGRESS NOTE ADULT - ASSESSMENT
Impression:  New onset seizure now with aspiration. COVID +    Plan:    Follow per ICU care.  Continue Keppra 500mg q12.  Await for MRI Brain with and without stormy seizure protocol.  IV ABX and Antiviral as per ID.  LP per neurocritical care today.  DVT prophylaxis recommended.  Maintain seizure precautions.  Will follow.

## 2020-03-23 NOTE — PROGRESS NOTE ADULT - SUBJECTIVE AND OBJECTIVE BOX
Patient is a 70y old  Male who presents with a chief complaint of respiratory failure, seizure, aspiration pna (22 Mar 2020 11:35)      BRIEF HOSPITAL COURSE:   Patient is a 70 year old male with a pmhx of HTn who presents to [facility] on 3/20 for dizziness. Originally discharged, however presented back several hour later with AMS. Patient then had witnessed seziure, bit tounge, aspirated large amount of blood and was then intubated for airway protection. He then was admitted to ICU for further management.      Events last 24 hours:   - EEG negative  - CPAP off sedation however during echo became agitated  - will switch to precedex and CPAP as able  - need LP and MRI given fever, AMS, new onset seizure       PAST MEDICAL & SURGICAL HISTORY:  HTN (hypertension)  No significant past surgical history    Allergies    No Known Allergies    Intolerances      FAMILY HISTORY:  unable to attain 2/2 intubation     Review of Systems:  unable to attain 2/2 intubation     Medications:  acyclovir IVPB 800 milliGRAM(s) IV Intermittent every 8 hours  acyclovir IVPB      piperacillin/tazobactam IVPB.. 3.375 Gram(s) IV Intermittent every 8 hours  vancomycin  IVPB 1250 milliGRAM(s) IV Intermittent every 24 hours  dexMEDEtomidine Infusion 0.3 MICROgram(s)/kG/Hr IV Continuous <Continuous>  fentaNYL   Infusion. 0.5 MICROgram(s)/kG/Hr IV Continuous <Continuous>  levETIRAcetam  IVPB 500 milliGRAM(s) IV Intermittent every 12 hours  propofol Infusion 41.667 MICROgram(s)/kG/Min IV Continuous <Continuous>  enoxaparin Injectable 40 milliGRAM(s) SubCutaneous daily  pantoprazole  Injectable 40 milliGRAM(s) IV Push daily  sucralfate suspension 1 Gram(s) Oral every 6 hours  chlorhexidine 0.12% Liquid 15 milliLiter(s) Oral Mucosa two times a day  chlorhexidine 4% Liquid 1 Application(s) Topical <User Schedule>        Mode: AC/ CMV (Assist Control/ Continuous Mandatory Ventilation)  RR (machine): 18  TV (machine): 400  FiO2: 50  PEEP: 5  MAP: 12  PIP: 35      ICU Vital Signs Last 24 Hrs  T(C): 37.3 (23 Mar 2020 05:00), Max: 37.6 (22 Mar 2020 20:00)  T(F): 99.1 (23 Mar 2020 05:00), Max: 99.7 (22 Mar 2020 20:00)  HR: 103 (23 Mar 2020 09:00) (90 - 112)  BP: 173/83 (23 Mar 2020 09:00) (118/71 - 187/84)  BP(mean): 119 (23 Mar 2020 09:00) (87 - 137)  RR: 15 (23 Mar 2020 09:00) (9 - 27)  SpO2: 100% (23 Mar 2020 09:00) (98% - 100%)      ABG - ( 22 Mar 2020 21:54 )  pH, Arterial: 7.50  pH, Blood: x     /  pCO2: 29    /  pO2: 97    / HCO3: 24    / Base Excess: x     /  SaO2: 97              I&O's Detail    22 Mar 2020 07:01  -  23 Mar 2020 07:00  --------------------------------------------------------  IN:    Enteral Tube Flush: 120 mL    fentaNYL Infusion.: 65 mL    propofol Infusion: 597 mL    Solution: 500 mL    Solution: 200 mL    Solution: 100 mL  Total IN: 1582 mL    OUT:    Indwelling Catheter - Urethral: 2950 mL  Total OUT: 2950 mL    Total NET: -1368 mL      23 Mar 2020 07:01  -  23 Mar 2020 09:40  --------------------------------------------------------  IN:    fentaNYL Infusion.: 15 mL  Total IN: 15 mL    OUT:    Indwelling Catheter - Urethral: 75 mL  Total OUT: 75 mL    Total NET: -60 mL            LABS:                        10.4   11.84 )-----------( 114      ( 23 Mar 2020 06:12 )             31.4     03-23    135  |  100  |  29.0<H>  ----------------------------<  177<H>  4.2   |  20.0<L>  |  1.42<H>    Ca    7.5<L>      23 Mar 2020 06:12  Phos  3.8     03-23  Mg     2.4     03-23    TPro  6.1<L>  /  Alb  2.1<L>  /  TBili  0.4  /  DBili  x   /  AST  51<H>  /  ALT  23  /  AlkPhos  62  03-23          CAPILLARY BLOOD GLUCOSE            CULTURES:  Culture Results:   Growth in anaerobic bottle: Gram Positive Cocci in Clusters  Anaerobic Bottle: 14:46 Hours to positivity  Aerobic Bottle: No growth to date  ***Blood Panel PCR results on this specimen are available  approximately 3 hours after the Gram stain result.***  Gram stain, PCR, and/or culture results may not always  correspond due to difference in methodologies.  ************************************************************  This PCR assay was performed using [employer].  The following targets are tested for: Enterococcus,  vancomycin resistant enterococci, Listeria monocytogenes,  coagulase negative staphylococci, S. aureus,  methicillin resistant S. aureus, Streptococcus agalactiae  (Group B), S. pneumoniae, S. pyogenes (Group A),  Acinetobacter baumannii, Enterobacter cloacae, E. coli,  Klebsiella oxytoca, K. pneumoniae, Proteus sp.,  Serratia marcescens, Haemophilus influenzae,  Neisseria meningitidis, Pseudomonas aeruginosa, Candida  albicans, C. glabrata, C krusei, C parapsilosis,  C. tropicalis and the KPC resistance gene.  ,  TYPE: (C=Critical, N=Notification, A=Abnormal) c  TESTS:  _   DATE/TIME CALLED: _ 03/22/2020 09:39:45  CALLED TO: Albin loza  READ BACK (2 Patient Identifiers)(Y/N): _ y  READ BACK VALUES (Y/N): _ y  CALLED BY: Albin leigh (03-21 @ 17:22)  Culture Results:   No growth at 48 hours (03-21 @ 07:12)      Physical Examination:    General: intubated, sedated in NAD     HEENT: Pupils equal, reactive to light.  Symmetric.    PULM: coarse breath sounds  bilaterally, + significant sputum production    CVS: Regular rate and rhythm, no murmurs, rubs, or gallops    ABD: Soft, nondistended, nontender, normoactive bowel sounds, no masses    EXT: No edema, nontender    SKIN: Warm and well perfused, no rashes noted.    Neuro: currently sedated    RADIOLOGY:   < from: CT Chest No Cont (03.20.20 @ 10:40) >  INTERPRETATION:  CLINICAL INFORMATION: Cough and shortness of breath.    COMPARISON: Chest radiograph 3/20/2020.    PROCEDURE:   CT of the Chest was performed without intravenous contrast.  Sagittal and coronal reformats were performed.      FINDINGS:  Respiratory motion and streak artifact degrades image quality, limiting evaluation.    LUNGS AND AIRWAYS:   PLEURA: There are bilateral lower lobe airspace consolidations.  There are trace bilateral pleural effusions.    The central airways remain patent.    MEDIASTINUM AND MIGUEL: No lymphadenopathy.    VESSELS: Atherosclerotic calcification of the aorta.    HEART: Heart size is normal. No pericardial effusion.    CHEST WALL AND LOWER NECK: Within normal limits.    VISUALIZED UPPER ABDOMEN:   Partially imaged is a cystic lesion left upper quadrant abdomen, which could reflect exophytic renal cyst.    BONES: Age indeterminate compression deformity T10 vertebral body.  Partially imaged fracture the proximal left humerus.    IMPRESSION:     Limited, motion study.    Bibasilar airspace consolidations, which may reflect pneumonia in the appropriate clinical setting.    Trace pleural effusions.    Fracture proximal left humerus, recommend clinical correlation and dedicated left shoulder/humerus radiographs.    < end of copied text >    < from: CT Angio Head w/ IV Cont (03.20.20 @ 11:03) >  INTERPRETATION:  EXAMS:  1.  CT ANGIOGRAPHY NECK WITH INTRAVENOUS CONTRAST.  2.  CT ANGIOGRAPHY BRAIN WITH INTRAVENOUS CONTRAST.    CLINICAL HISTORY: aphasia, CVA. . .     TECHNIQUE: Contrast enhanced axial CT images were acquired from the aortic arch to the vertex of the calvarium, during the angiographic phase.  Three-dimensional maximum intensityprojection reformats were generated.  90 ml of Omnipaque-350 mg/ml were administered intravenously, without immediate complication.    COMPARISON STUDY: CT head 3/19/2020 and 3/20/2020    FINDINGS:     The study is limited by venous contamination.    CT ANGIOGRAPHY NECK:     Thoracic aorta and branch vessels: Patent.  Right carotid system: Patent.  No hemodynamically significant stenosis using NASCET criteria.  No evidence of dissection.  Left carotid system: Patent.  No hemodynamically significant stenosis using NASCET criteria.  No evidence of dissection.  Vertebral arteries: No focal stenosis or dissection.     Soft tissues of the neck: Unremarkable.  Visualized spine: Degenerative changes in the spine.  Visualized upper chest: Unremarkable.    CT ANGIOGRAPHY BRAIN:    Internal carotid arteries: Patent.   Anterior cerebral arteries: Patent.   Middle cerebral arteries: Patent. Moderate stenosis of a proximal right M2 branch.  Posterior cerebral arteries: Patent.   Vertebrobasilar: Patent.  Branch vasculature of the posterior circulation is within normal limits.     Vascular lesions: No evidence of intracranial aneurysm or large vascular malformation, within limits of CT technique.    Bilateral intraocular lens implants.  Paranasal sinus mucosal thickening.    IMPRESSION:   CT angiography neck: No hemodynamically significant stenosis of the bilateral cervical ICAs using NASCET criteria.  Patent vertebral arteries.  No evidence of vascular dissection.    CT angiography brain:   1. Moderate stenosis of a proximal right M2 branch. No large vessel occlusion. No evidence of aneurysm.   2.  If clinical suspicion for acute infarct persists, brain MRI can be obtained.          CRITICAL CARE TIME SPENT: Critical Care time: 35 mins assessing presenting problems of acute illness that poses high probability of life threatening deterioration or end organ damage/dysfunction.  Medical decision making inculding Initiating plan of care, reviewing data, reviewing radiology,direct patient bedside evaluation and interpretation of vital signs, any necessary ventilator management , discusion with multidisciplinary team, discussing goals of care with patient/family, all non inclusive of procedures Patient is a 70y old  Male who presents with a chief complaint of respiratory failure, seizure, aspiration pna (22 Mar 2020 11:35)      BRIEF HOSPITAL COURSE:   Patient is a 70 year old male with a pmhx of HTn who presents to [facility] on 3/20 for dizziness. Originally discharged, however presented back several hour later with AMS. Patient then had witnessed seziure, bit tounge, aspirated large amount of blood and was then intubated for airway protection. He then was admitted to ICU for further management.      Events last 24 hours:   - EEG negative  - CPAP off sedation however during echo became agitated  - will switch to precedex and CPAP as able  - need LP and MRI given fever, AMS, new onset seizure       PAST MEDICAL & SURGICAL HISTORY:  HTN (hypertension)  No significant past surgical history    Allergies    No Known Allergies    Intolerances      FAMILY HISTORY:  unable to attain 2/2 intubation     Review of Systems:  unable to attain 2/2 intubation     Medications:  acyclovir IVPB 800 milliGRAM(s) IV Intermittent every 8 hours  acyclovir IVPB      piperacillin/tazobactam IVPB.. 3.375 Gram(s) IV Intermittent every 8 hours  vancomycin  IVPB 1250 milliGRAM(s) IV Intermittent every 24 hours  dexMEDEtomidine Infusion 0.3 MICROgram(s)/kG/Hr IV Continuous <Continuous>  fentaNYL   Infusion. 0.5 MICROgram(s)/kG/Hr IV Continuous <Continuous>  levETIRAcetam  IVPB 500 milliGRAM(s) IV Intermittent every 12 hours  propofol Infusion 41.667 MICROgram(s)/kG/Min IV Continuous <Continuous>  enoxaparin Injectable 40 milliGRAM(s) SubCutaneous daily  pantoprazole  Injectable 40 milliGRAM(s) IV Push daily  sucralfate suspension 1 Gram(s) Oral every 6 hours  chlorhexidine 0.12% Liquid 15 milliLiter(s) Oral Mucosa two times a day  chlorhexidine 4% Liquid 1 Application(s) Topical <User Schedule>        Mode: AC/ CMV (Assist Control/ Continuous Mandatory Ventilation)  RR (machine): 18  TV (machine): 400  FiO2: 50  PEEP: 5  MAP: 12  PIP: 35      ICU Vital Signs Last 24 Hrs  T(C): 37.3 (23 Mar 2020 05:00), Max: 37.6 (22 Mar 2020 20:00)  T(F): 99.1 (23 Mar 2020 05:00), Max: 99.7 (22 Mar 2020 20:00)  HR: 103 (23 Mar 2020 09:00) (90 - 112)  BP: 173/83 (23 Mar 2020 09:00) (118/71 - 187/84)  BP(mean): 119 (23 Mar 2020 09:00) (87 - 137)  RR: 15 (23 Mar 2020 09:00) (9 - 27)  SpO2: 100% (23 Mar 2020 09:00) (98% - 100%)      ABG - ( 22 Mar 2020 21:54 )  pH, Arterial: 7.50  pH, Blood: x     /  pCO2: 29    /  pO2: 97    / HCO3: 24    / Base Excess: x     /  SaO2: 97              I&O's Detail    22 Mar 2020 07:01  -  23 Mar 2020 07:00  --------------------------------------------------------  IN:    Enteral Tube Flush: 120 mL    fentaNYL Infusion.: 65 mL    propofol Infusion: 597 mL    Solution: 500 mL    Solution: 200 mL    Solution: 100 mL  Total IN: 1582 mL    OUT:    Indwelling Catheter - Urethral: 2950 mL  Total OUT: 2950 mL    Total NET: -1368 mL      23 Mar 2020 07:01  -  23 Mar 2020 09:40  --------------------------------------------------------  IN:    fentaNYL Infusion.: 15 mL  Total IN: 15 mL    OUT:    Indwelling Catheter - Urethral: 75 mL  Total OUT: 75 mL    Total NET: -60 mL            LABS:                        10.4   11.84 )-----------( 114      ( 23 Mar 2020 06:12 )             31.4     03-23    135  |  100  |  29.0<H>  ----------------------------<  177<H>  4.2   |  20.0<L>  |  1.42<H>    Ca    7.5<L>      23 Mar 2020 06:12  Phos  3.8     03-23  Mg     2.4     03-23    TPro  6.1<L>  /  Alb  2.1<L>  /  TBili  0.4  /  DBili  x   /  AST  51<H>  /  ALT  23  /  AlkPhos  62  03-23          CAPILLARY BLOOD GLUCOSE            CULTURES:  Culture Results:   Growth in anaerobic bottle: Gram Positive Cocci in Clusters  Anaerobic Bottle: 14:46 Hours to positivity  Aerobic Bottle: No growth to date  ***Blood Panel PCR results on this specimen are available  approximately 3 hours after the Gram stain result.***  Gram stain, PCR, and/or culture results may not always  correspond due to difference in methodologies.  ************************************************************  This PCR assay was performed using [employer].  The following targets are tested for: Enterococcus,  vancomycin resistant enterococci, Listeria monocytogenes,  coagulase negative staphylococci, S. aureus,  methicillin resistant S. aureus, Streptococcus agalactiae  (Group B), S. pneumoniae, S. pyogenes (Group A),  Acinetobacter baumannii, Enterobacter cloacae, E. coli,  Klebsiella oxytoca, K. pneumoniae, Proteus sp.,  Serratia marcescens, Haemophilus influenzae,  Neisseria meningitidis, Pseudomonas aeruginosa, Candida  albicans, C. glabrata, C krusei, C parapsilosis,  C. tropicalis and the KPC resistance gene.  ,  TYPE: (C=Critical, N=Notification, A=Abnormal) c  TESTS:  _   DATE/TIME CALLED: _ 03/22/2020 09:39:45  CALLED TO: Albin loza  READ BACK (2 Patient Identifiers)(Y/N): _ y  READ BACK VALUES (Y/N): _ y  CALLED BY: Albin leigh (03-21 @ 17:22)  Culture Results:   No growth at 48 hours (03-21 @ 07:12)      Physical Examination:    General: intubated, sedated in NAD     HEENT: Pupils equal, reactive to light.  Symmetric.    PULM: coarse breath sounds  bilaterally, + significant sputum production    CVS: Regular rate and rhythm, no murmurs, rubs, or gallops    ABD: Soft, nondistended, nontender, normoactive bowel sounds, no masses    EXT: No edema, nontender    SKIN: Warm and well perfused, no rashes noted.    Neuro: currently sedated    RADIOLOGY:   < from: CT Chest No Cont (03.20.20 @ 10:40) >  INTERPRETATION:  CLINICAL INFORMATION: Cough and shortness of breath.    COMPARISON: Chest radiograph 3/20/2020.    PROCEDURE:   CT of the Chest was performed without intravenous contrast.  Sagittal and coronal reformats were performed.      FINDINGS:  Respiratory motion and streak artifact degrades image quality, limiting evaluation.    LUNGS AND AIRWAYS:   PLEURA: There are bilateral lower lobe airspace consolidations.  There are trace bilateral pleural effusions.    The central airways remain patent.    MEDIASTINUM AND MIGUEL: No lymphadenopathy.    VESSELS: Atherosclerotic calcification of the aorta.    HEART: Heart size is normal. No pericardial effusion.    CHEST WALL AND LOWER NECK: Within normal limits.    VISUALIZED UPPER ABDOMEN:   Partially imaged is a cystic lesion left upper quadrant abdomen, which could reflect exophytic renal cyst.    BONES: Age indeterminate compression deformity T10 vertebral body.  Partially imaged fracture the proximal left humerus.    IMPRESSION:     Limited, motion study.    Bibasilar airspace consolidations, which may reflect pneumonia in the appropriate clinical setting.    Trace pleural effusions.    Fracture proximal left humerus, recommend clinical correlation and dedicated left shoulder/humerus radiographs.    < end of copied text >    < from: CT Angio Head w/ IV Cont (03.20.20 @ 11:03) >  INTERPRETATION:  EXAMS:  1.  CT ANGIOGRAPHY NECK WITH INTRAVENOUS CONTRAST.  2.  CT ANGIOGRAPHY BRAIN WITH INTRAVENOUS CONTRAST.    CLINICAL HISTORY: aphasia, CVA. . .     TECHNIQUE: Contrast enhanced axial CT images were acquired from the aortic arch to the vertex of the calvarium, during the angiographic phase.  Three-dimensional maximum intensityprojection reformats were generated.  90 ml of Omnipaque-350 mg/ml were administered intravenously, without immediate complication.    COMPARISON STUDY: CT head 3/19/2020 and 3/20/2020    FINDINGS:     The study is limited by venous contamination.    CT ANGIOGRAPHY NECK:     Thoracic aorta and branch vessels: Patent.  Right carotid system: Patent.  No hemodynamically significant stenosis using NASCET criteria.  No evidence of dissection.  Left carotid system: Patent.  No hemodynamically significant stenosis using NASCET criteria.  No evidence of dissection.  Vertebral arteries: No focal stenosis or dissection.     Soft tissues of the neck: Unremarkable.  Visualized spine: Degenerative changes in the spine.  Visualized upper chest: Unremarkable.    CT ANGIOGRAPHY BRAIN:    Internal carotid arteries: Patent.   Anterior cerebral arteries: Patent.   Middle cerebral arteries: Patent. Moderate stenosis of a proximal right M2 branch.  Posterior cerebral arteries: Patent.   Vertebrobasilar: Patent.  Branch vasculature of the posterior circulation is within normal limits.     Vascular lesions: No evidence of intracranial aneurysm or large vascular malformation, within limits of CT technique.    Bilateral intraocular lens implants.  Paranasal sinus mucosal thickening.    IMPRESSION:   CT angiography neck: No hemodynamically significant stenosis of the bilateral cervical ICAs using NASCET criteria.  Patent vertebral arteries.  No evidence of vascular dissection.    CT angiography brain:   1. Moderate stenosis of a proximal right M2 branch. No large vessel occlusion. No evidence of aneurysm.   2.  If clinical suspicion for acute infarct persists, brain MRI can be obtained.          CRITICAL CARE TIME SPENT: Critical Care time: 35 mins assessing presenting problems of acute illness that poses high probability of life threatening deterioration or end organ damage/dysfunction.  Medical decision making inculding Initiating plan of care, reviewing data, reviewing radiology,direct patient bedside evaluation and interpretation of vital signs, any necessary ventilator management , discusion with multidisciplinary team, discussing goals of care with patient/family, all non inclusive of procedures

## 2020-03-23 NOTE — PROGRESS NOTE ADULT - SUBJECTIVE AND OBJECTIVE BOX
70 year old male with a pmhx of HTN, who presents to [facility] on 3/20 for dizziness and fever, and later witnessed seizures with aspiration event, intubated for desaturation.  Tested positive for COVID.    24-h events: remains intubated; tolerated CPAP, reportedly followed some commands in am but got agitated, required sedation.    LABS: reviewed.  Recent imaging studies were reviewed.  MEDICATIONS: reviewed.    General: on Fentanyl ggt; became agitated during exam.  HEENT:  PERRL 3mm, no tracking, no blink to threat.  Neuro:  EO spont, attempts to get off the bed, not FC, motor - JOYCE.  Respiratory:  no respiratory distress, on vent. 18/400/+5/50%. Pplat acceptable, Osat 96.  Abdomen:  soft.

## 2020-03-23 NOTE — PROGRESS NOTE ADULT - SUBJECTIVE AND OBJECTIVE BOX
[facility]  INFECTIOUS DISEASES AND INTERNAL MEDICINE at [place]  =======================================================  Collins Casarez MD  Diplomates American Board of Internal Medicine and Infectious Diseases  Tel: [phone]      Fax: [phone]  =======================================================    [facility]AGE [facility] [id number]    Follow up: COVID 19    No fevers    remains intubated on vent     s/p LP today     Allergies:  No Known Allergies      Antibiotics:  acyclovir IVPB 800 milliGRAM(s) IV Intermittent every 8 hours  piperacillin/tazobactam IVPB.. 3.375 Gram(s) IV Intermittent every 8 hours  vancomycin  IVPB 1250 milliGRAM(s) IV Intermittent every 24 hours        REVIEW OF SYSTEMS:  Unable to obtain due to medical condition       Physical Exam:  ICU Vital Signs Last 24 Hrs  T(C): 36.6 (23 Mar 2020 16:00), Max: 37.6 (22 Mar 2020 20:00)  T(F): 97.9 (23 Mar 2020 16:00), Max: 99.7 (22 Mar 2020 20:00)  HR: 80 (23 Mar 2020 16:10) (69 - 112)  BP: 149/84 (23 Mar 2020 16:00) (97/56 - 187/84)  BP(mean): 110 (23 Mar 2020 16:00) (70 - 137)  RR: 10 (23 Mar 2020 16:00) (5 - 27)  SpO2: 100% (23 Mar 2020 16:10) (96% - 100%)      GEN: Sedated on vent  HEENT: normocephalic and atraumatic. EOMI. PERRL.  Anicteric  NECK: Supple.   LUNGS: Course BS B/L  HEART: Regular rate and rhythm   ABDOMEN: Soft, nontender, and nondistended.  Positive bowel sounds.    : No CVA tenderness  EXTREMITIES: Without any edema.  MSK: No joint swelling  NEUROLOGIC: Sedated on vent  PSYCHIATRIC: Sedated on vent  SKIN: No Rash      Labs:  03-23    135  |  100  |  29.0<H>  ----------------------------<  177<H>  4.2   |  20.0<L>  |  1.42<H>    Ca    7.5<L>      23 Mar 2020 06:12  Phos  3.8     03-23  Mg     2.4     03-23    TPro  6.1<L>  /  Alb  2.1<L>  /  TBili  0.4  /  DBili  x   /  AST  51<H>  /  ALT  23  /  AlkPhos  62  03-23               10.4   11.84 )-----------( 114      ( 23 Mar 2020 06:12 )             31.4     LIVER FUNCTIONS - ( 23 Mar 2020 06:12 )  Alb: 2.1 g/dL / Pro: 6.1 g/dL / ALK PHOS: 62 U/L / ALT: 23 U/L / AST: 51 U/L / GGT: x             ABG - ( 23 Mar 2020 10:28 )  pH, Arterial: 7.39  pH, Blood: x     /  pCO2: 42    /  pO2: 83    / HCO3: 24    / Base Excess: 0.2   /  SaO2: 94          Procalcitonin, Serum: 2.45 ng/mL (03-21-20 @ 04:29)      Protein, CSF (03.23.20 @ 11:46)    Protein, CSF: 143 mg/dL      Glucose, CSF (03.23.20 @ 11:46)    Glucose, CSF: 77 mg/dLG/24h      Cerebrospinal Fluid Cell Count-1 (03.23.20 @ 11:46)    Total Nucleated Cell Count, CSF: 5 /uL    CSF Color: No Color    CSF Appearance: Clear    CSF Segmented Neutrophils: N/A %      RECENT CULTURES:  03-22 @ 16:32 .Sputum     Few polymorphonuclear leukocytes per low power field  Rare Squamous epithelial cells per low power field  No organisms seen per oil power field      03-21 @ 17:22 .Blood Coag Negative Staphylococcus  Blood Culture PCR    Growth in anaerobic bottle: Coag Negative Staphylococcus  Anaerobic Bottle: 14:46 Hours to positivity  Aerobic Bottle: No growth to date  ***Blood Panel PCR results on this specimen are available  approximately 3 hours after the Gram stain result.***  Gram stain, PCR, and/or culture results may not always  correspond due to difference in methodologies.  ************************************************************  This PCR assay was performed using [employer].  The following targets are tested for: Enterococcus,  vancomycin resistant enterococci, Listeria monocytogenes,  coagulase negative staphylococci, S. aureus,  methicillin resistant S. aureus, Streptococcus agalactiae  (Group B), S. pneumoniae, S. pyogenes (Group A),  Acinetobacter baumannii, Enterobacter cloacae, E. coli,  Klebsiella oxytoca, K. pneumoniae, Proteus sp.,  Serratia marcescens, Haemophilus influenzae,  Neisseria meningitidis, Pseudomonas aeruginosa, Candida  albicans, C. glabrata, C krusei, C parapsilosis,  C.tropicalis and the KPC resistance gene.      03-21 @ 07:12 .Blood     No growth at 48 hours      03-21 @ 01:24    RVP  NotDetec

## 2020-03-23 NOTE — PROGRESS NOTE ADULT - ASSESSMENT
69y/o  Male with h/o HTN. Patient presented to ED 3/20 with dizziness and was discharged after evaluation in the ER. He returned to the ER 3/20 with AMS, and had a seizure episode in the ER. Patient was given ativan and during the seizure had a bite to this tongue and aspirated blood.  He was hypoxic and required emergent intubation. Patient was febrile in the ER no leukocytosis. He was admitted to MICU, COVID PCR was sent and is positive. Patient also started on Acyclovir for ? encephalitis given seizure episode.       COVID 19  Acute hypoxic respiratory failure  Seizure episode  AMS  encephalopathy likely viral   Positive blood culture  ODESSA      - Blood cultures with CoNS in 1 of 4 bottles, likely contamination  - COVID 19 +  - RVP negative  - CXR with rt sided pneumonia  - UA with no UTI  - Procalcitonin level 2.45  - Continue Zosyn  - Continue Vancomycin  - Monitor trough  - Will continue Acyclovir for possible encephalitis until further work up can be done  - LP consistent with viral encephalitis  - CSF HSV PCR pending  - Trend Fever  - Trend Leukocytosis      Will Follow

## 2020-03-23 NOTE — PROGRESS NOTE ADULT - ASSESSMENT
Assessment:  69 yo M with new onset sz. Unclear etiology, concern for CNS infection. CEEG with no epileptiform discharges on admission.  Encephalopathy, likely toxic/infectious.  Acute resp hypoxemic failure due to aspiration PNA. Vent-dependent.  COvid+.  CKD.  Acute on chronic normocytic anemia, due to critical illness/infection with renal disease.  Thrombocytopenia, due to critical illness. Fibrinogen acceptable.    Plan:  -please, continue neurochecks q1 hr in ICU settings  -on Propofol and Fentanyl for sedation and analgesia, goal RASS 0 to -2, would try to wean (lenny. Fentanyl, no high PEEP/LTV vent requirements)  -pending CSF studies (LP done, no events), including flow cytometry  -pending brain MRI w/wo contrast to r/o structural cerebral lesion  -would continue Vanco/Zosyn/acyclovir for now; of note, CSF obtained after ABX administration, might be of limited yield; hence, if high clinical suspicion for CNS infection. would consider completing the course  -please, continue Keppra 500 q12hrs for now; low threshold to repeat cEEG (24 hours) if remains encephalopathic  -rest of the management as per MICU team    Patient is critically ill and at high risk of death or neurologic complications due to sz, cardiorespiratory or multiorgan failure.

## 2020-03-23 NOTE — PROGRESS NOTE ADULT - SUBJECTIVE AND OBJECTIVE BOX
INTERVAL HISTORY:  Seen at bedside in ICU.  No further seizures reported.    No Known Allergies      VITAL SIGNS:  Vital Signs Last 24 Hrs  T(C): 37.3 (23 Mar 2020 05:00), Max: 37.6 (22 Mar 2020 20:00)  T(F): 99.1 (23 Mar 2020 05:00), Max: 99.7 (22 Mar 2020 20:00)  HR: 103 (23 Mar 2020 09:00) (90 - 112)  BP: 173/83 (23 Mar 2020 09:00) (118/71 - 187/84)  BP(mean): 119 (23 Mar 2020 09:00) (87 - 137)  RR: 15 (23 Mar 2020 09:00) (9 - 27)  SpO2: 100% (23 Mar 2020 09:00) (98% - 100%)    PHYSICAL EXAMINATION:  General: Well-developed, well nourished, in no acute distress.  Eyes: Conjunctiva and sclera clear.  Neurologic:  - Mental Status:  Intubated/sedated; nonverbal; doesn't follow.  Cranial Nerves II-XII:  + pupil pinpoint b/l reactive; + corneal; no facial; + cough on suction.  - Motor:  Moves all 4 ext noxious with head movements.  - Reflexes:  1+ and symmetric throughout. Plantars WD.  - Sensory:  WD to noxious minimally.  - Coordination:  Pt unable.   - Gait: Deferred.    MEDS:  MEDICATIONS  (STANDING):  acyclovir IVPB 800 milliGRAM(s) IV Intermittent every 8 hours  acyclovir IVPB      chlorhexidine 0.12% Liquid 15 milliLiter(s) Oral Mucosa two times a day  chlorhexidine 4% Liquid 1 Application(s) Topical <User Schedule>  dexMEDEtomidine Infusion 0.3 MICROgram(s)/kG/Hr (7.5 mL/Hr) IV Continuous <Continuous>  enoxaparin Injectable 40 milliGRAM(s) SubCutaneous daily  fentaNYL   Infusion. 0.5 MICROgram(s)/kG/Hr (5 mL/Hr) IV Continuous <Continuous>  levETIRAcetam  IVPB 500 milliGRAM(s) IV Intermittent every 12 hours  pantoprazole  Injectable 40 milliGRAM(s) IV Push daily  piperacillin/tazobactam IVPB.. 3.375 Gram(s) IV Intermittent every 8 hours  propofol Infusion 41.667 MICROgram(s)/kG/Min (25 mL/Hr) IV Continuous <Continuous>  sucralfate suspension 1 Gram(s) Oral every 6 hours  vancomycin  IVPB 1250 milliGRAM(s) IV Intermittent every 24 hours    MEDICATIONS  (PRN):      LABS:                          10.4   11.84 )-----------( 114      ( 23 Mar 2020 06:12 )             31.4     03-23    135  |  100  |  29.0<H>  ----------------------------<  177<H>  4.2   |  20.0<L>  |  1.42<H>    Ca    7.5<L>      23 Mar 2020 06:12  Phos  3.8     03-23  Mg     2.4     03-23    TPro  6.1<L>  /  Alb  2.1<L>  /  TBili  0.4  /  DBili  x   /  AST  51<H>  /  ALT  23  /  AlkPhos  62  03-23    LIVER FUNCTIONS - ( 23 Mar 2020 06:12 )  Alb: 2.1 g/dL / Pro: 6.1 g/dL / ALK PHOS: 62 U/L / ALT: 23 U/L / AST: 51 U/L / GGT: x               RADIOLOGY & ADDITIONAL STUDIES:      < from: CT Angio Head/Neck w/ IV Cont (03.20.20 @ 11:03) >  CT angiography neck: No hemodynamically significant stenosis of the bilateral cervical ICAs using NASCET criteria.  Patent vertebral arteries.  No evidence of vascular dissection.    CT angiography brain:   1. Moderate stenosis of a proximal right M2 branch. No large vessel occlusion. No evidence of aneurysm.   2.  If clinical suspicion for acute infarct persists, brain MRI can be obtained.    < end of copied text >  < from: CT Head No Cont (03.20.20 @ 06:34) >  No acute intracranial hemorrhage or mass effect.    < from: CT Head No Cont (03.19.20 @ 09:13) >  No acute intracranial findings.      EEG 3/21/20  EEG SUMMARY/CLASSIFICATION  Abnormal EEG in an altered patient.  - Moderate to severe generalized slowing.  _____________________________________________________________  EEG IMPRESSION/CLINICAL CORRELATE  Abnormal EEG study.  1. Moderate to severe nonspecific diffuse or multifocal cerebral dysfunction.   2. No epileptiform pattern or seizure seen.    3/22/20  EEG SUMMARY/CLASSIFICATION  Abnormal EEG in an altered patient.  - Moderate to severe generalized slowing.  _____________________________________________________________  EEG IMPRESSION/CLINICAL CORRELATE  Abnormal EEG study.  1. Moderate to severe nonspecific diffuse or multifocal cerebral dysfunction.   2. No epileptiform pattern or seizure seen.

## 2020-03-24 LAB
ALBUMIN SERPL ELPH-MCNC: 2.6 G/DL — LOW (ref 3.3–5.2)
ALP SERPL-CCNC: 61 U/L — SIGNIFICANT CHANGE UP (ref 40–120)
ALT FLD-CCNC: 30 U/L — SIGNIFICANT CHANGE UP
ANION GAP SERPL CALC-SCNC: 12 MMOL/L — SIGNIFICANT CHANGE UP (ref 5–17)
AST SERPL-CCNC: 58 U/L — HIGH
BASOPHILS # BLD AUTO: 0.01 K/UL — SIGNIFICANT CHANGE UP (ref 0–0.2)
BASOPHILS NFR BLD AUTO: 0.1 % — SIGNIFICANT CHANGE UP (ref 0–2)
BILIRUB SERPL-MCNC: 1 MG/DL — SIGNIFICANT CHANGE UP (ref 0.4–2)
BUN SERPL-MCNC: 29 MG/DL — HIGH (ref 8–20)
CALCIUM SERPL-MCNC: 7.9 MG/DL — LOW (ref 8.6–10.2)
CHLORIDE SERPL-SCNC: 99 MMOL/L — SIGNIFICANT CHANGE UP (ref 98–107)
CO2 SERPL-SCNC: 23 MMOL/L — SIGNIFICANT CHANGE UP (ref 22–29)
CREAT SERPL-MCNC: 1.19 MG/DL — SIGNIFICANT CHANGE UP (ref 0.5–1.3)
CRP SERPL-MCNC: 11.41 MG/DL — HIGH (ref 0–0.4)
CULTURE RESULTS: SIGNIFICANT CHANGE UP
EOSINOPHIL # BLD AUTO: 0.03 K/UL — SIGNIFICANT CHANGE UP (ref 0–0.5)
EOSINOPHIL NFR BLD AUTO: 0.4 % — SIGNIFICANT CHANGE UP (ref 0–6)
ERYTHROCYTE [SEDIMENTATION RATE] IN BLOOD: 52 MM/HR — HIGH (ref 0–20)
FERRITIN SERPL-MCNC: 328 NG/ML — SIGNIFICANT CHANGE UP (ref 30–400)
GLUCOSE SERPL-MCNC: 123 MG/DL — HIGH (ref 70–99)
HCT VFR BLD CALC: 34 % — LOW (ref 39–50)
HGB BLD-MCNC: 11.2 G/DL — LOW (ref 13–17)
IMM GRANULOCYTES NFR BLD AUTO: 1.4 % — SIGNIFICANT CHANGE UP (ref 0–1.5)
LDH CSF L TO P-CCNC: 21 U/L — SIGNIFICANT CHANGE UP
LDH FLD-CCNC: 21 U/L — SIGNIFICANT CHANGE UP
LYMPHOCYTES # BLD AUTO: 0.66 K/UL — LOW (ref 1–3.3)
LYMPHOCYTES # BLD AUTO: 9.2 % — LOW (ref 13–44)
MAGNESIUM SERPL-MCNC: 2.2 MG/DL — SIGNIFICANT CHANGE UP (ref 1.6–2.6)
MCHC RBC-ENTMCNC: 27.6 PG — SIGNIFICANT CHANGE UP (ref 27–34)
MCHC RBC-ENTMCNC: 32.9 GM/DL — SIGNIFICANT CHANGE UP (ref 32–36)
MCV RBC AUTO: 83.7 FL — SIGNIFICANT CHANGE UP (ref 80–100)
MONOCYTES # BLD AUTO: 1.15 K/UL — HIGH (ref 0–0.9)
MONOCYTES NFR BLD AUTO: 16.1 % — HIGH (ref 2–14)
NEUTROPHILS # BLD AUTO: 5.19 K/UL — SIGNIFICANT CHANGE UP (ref 1.8–7.4)
NEUTROPHILS NFR BLD AUTO: 72.8 % — SIGNIFICANT CHANGE UP (ref 43–77)
NIGHT BLUE STAIN TISS: SIGNIFICANT CHANGE UP
PHOSPHATE SERPL-MCNC: 3.5 MG/DL — SIGNIFICANT CHANGE UP (ref 2.4–4.7)
PLATELET # BLD AUTO: 101 K/UL — LOW (ref 150–400)
POTASSIUM SERPL-MCNC: 3.6 MMOL/L — SIGNIFICANT CHANGE UP (ref 3.5–5.3)
POTASSIUM SERPL-SCNC: 3.6 MMOL/L — SIGNIFICANT CHANGE UP (ref 3.5–5.3)
PROT SERPL-MCNC: 6.4 G/DL — LOW (ref 6.6–8.7)
RBC # BLD: 4.06 M/UL — LOW (ref 4.2–5.8)
RBC # FLD: 14.2 % — SIGNIFICANT CHANGE UP (ref 10.3–14.5)
SODIUM SERPL-SCNC: 134 MMOL/L — LOW (ref 135–145)
SPECIMEN SOURCE: SIGNIFICANT CHANGE UP
SPECIMEN SOURCE: SIGNIFICANT CHANGE UP
TM INTERPRETATION: SIGNIFICANT CHANGE UP
TROPONIN T SERPL-MCNC: <0.01 NG/ML — SIGNIFICANT CHANGE UP (ref 0–0.06)
WBC # BLD: 7.14 K/UL — SIGNIFICANT CHANGE UP (ref 3.8–10.5)
WBC # FLD AUTO: 7.14 K/UL — SIGNIFICANT CHANGE UP (ref 3.8–10.5)

## 2020-03-24 PROCEDURE — 99233 SBSQ HOSP IP/OBS HIGH 50: CPT

## 2020-03-24 PROCEDURE — 71045 X-RAY EXAM CHEST 1 VIEW: CPT | Mod: 26

## 2020-03-24 RX ORDER — HALOPERIDOL DECANOATE 100 MG/ML
5 INJECTION INTRAMUSCULAR
Refills: 0 | Status: DISCONTINUED | OUTPATIENT
Start: 2020-03-24 | End: 2020-03-24

## 2020-03-24 RX ORDER — ACETAMINOPHEN 500 MG
650 TABLET ORAL ONCE
Refills: 0 | Status: COMPLETED | OUTPATIENT
Start: 2020-03-24 | End: 2020-03-24

## 2020-03-24 RX ORDER — LEVETIRACETAM 250 MG/1
500 TABLET, FILM COATED ORAL
Refills: 0 | Status: DISCONTINUED | OUTPATIENT
Start: 2020-03-24 | End: 2020-03-25

## 2020-03-24 RX ORDER — LABETALOL HCL 100 MG
10 TABLET ORAL EVERY 4 HOURS
Refills: 0 | Status: DISCONTINUED | OUTPATIENT
Start: 2020-03-24 | End: 2020-03-24

## 2020-03-24 RX ORDER — HALOPERIDOL DECANOATE 100 MG/ML
2.5 INJECTION INTRAMUSCULAR ONCE
Refills: 0 | Status: COMPLETED | OUTPATIENT
Start: 2020-03-24 | End: 2020-03-24

## 2020-03-24 RX ORDER — ALBUMIN HUMAN 25 %
50 VIAL (ML) INTRAVENOUS EVERY 8 HOURS
Refills: 0 | Status: COMPLETED | OUTPATIENT
Start: 2020-03-24 | End: 2020-03-24

## 2020-03-24 RX ORDER — QUETIAPINE FUMARATE 200 MG/1
25 TABLET, FILM COATED ORAL
Refills: 0 | Status: DISCONTINUED | OUTPATIENT
Start: 2020-03-24 | End: 2020-03-25

## 2020-03-24 RX ORDER — FUROSEMIDE 40 MG
40 TABLET ORAL THREE TIMES A DAY
Refills: 0 | Status: COMPLETED | OUTPATIENT
Start: 2020-03-24 | End: 2020-03-24

## 2020-03-24 RX ORDER — LABETALOL HCL 100 MG
10 TABLET ORAL EVERY 4 HOURS
Refills: 0 | Status: DISCONTINUED | OUTPATIENT
Start: 2020-03-24 | End: 2020-04-07

## 2020-03-24 RX ORDER — HALOPERIDOL DECANOATE 100 MG/ML
2 INJECTION INTRAMUSCULAR EVERY 4 HOURS
Refills: 0 | Status: DISCONTINUED | OUTPATIENT
Start: 2020-03-24 | End: 2020-03-25

## 2020-03-24 RX ADMIN — Medication 10 MILLIGRAM(S): at 14:52

## 2020-03-24 RX ADMIN — Medication 50 MILLILITER(S): at 21:19

## 2020-03-24 RX ADMIN — Medication 40 MILLIGRAM(S): at 04:15

## 2020-03-24 RX ADMIN — Medication 2 MILLIGRAM(S): at 18:04

## 2020-03-24 RX ADMIN — Medication 650 MILLIGRAM(S): at 21:20

## 2020-03-24 RX ADMIN — Medication 1 GRAM(S): at 12:00

## 2020-03-24 RX ADMIN — LEVETIRACETAM 420 MILLIGRAM(S): 250 TABLET, FILM COATED ORAL at 05:00

## 2020-03-24 RX ADMIN — ENOXAPARIN SODIUM 40 MILLIGRAM(S): 100 INJECTION SUBCUTANEOUS at 12:00

## 2020-03-24 RX ADMIN — Medication 40 MILLIGRAM(S): at 21:18

## 2020-03-24 RX ADMIN — PIPERACILLIN AND TAZOBACTAM 25 GRAM(S): 4; .5 INJECTION, POWDER, LYOPHILIZED, FOR SOLUTION INTRAVENOUS at 05:01

## 2020-03-24 RX ADMIN — LEVETIRACETAM 500 MILLIGRAM(S): 250 TABLET, FILM COATED ORAL at 17:55

## 2020-03-24 RX ADMIN — HALOPERIDOL DECANOATE 5 MILLIGRAM(S): 100 INJECTION INTRAMUSCULAR at 11:59

## 2020-03-24 RX ADMIN — HALOPERIDOL DECANOATE 2 MILLIGRAM(S): 100 INJECTION INTRAMUSCULAR at 21:19

## 2020-03-24 RX ADMIN — Medication 1 GRAM(S): at 05:01

## 2020-03-24 RX ADMIN — Medication 650 MILLIGRAM(S): at 22:00

## 2020-03-24 RX ADMIN — QUETIAPINE FUMARATE 25 MILLIGRAM(S): 200 TABLET, FILM COATED ORAL at 22:31

## 2020-03-24 RX ADMIN — Medication 40 MILLIGRAM(S): at 13:34

## 2020-03-24 RX ADMIN — PIPERACILLIN AND TAZOBACTAM 25 GRAM(S): 4; .5 INJECTION, POWDER, LYOPHILIZED, FOR SOLUTION INTRAVENOUS at 21:20

## 2020-03-24 RX ADMIN — PIPERACILLIN AND TAZOBACTAM 25 GRAM(S): 4; .5 INJECTION, POWDER, LYOPHILIZED, FOR SOLUTION INTRAVENOUS at 13:33

## 2020-03-24 RX ADMIN — Medication 10 MILLIGRAM(S): at 20:34

## 2020-03-24 RX ADMIN — Medication 1 GRAM(S): at 17:55

## 2020-03-24 RX ADMIN — Medication 50 MILLILITER(S): at 13:32

## 2020-03-24 RX ADMIN — Medication 50 MILLILITER(S): at 04:16

## 2020-03-24 RX ADMIN — HALOPERIDOL DECANOATE 5 MILLIGRAM(S): 100 INJECTION INTRAMUSCULAR at 17:56

## 2020-03-24 RX ADMIN — Medication 1 GRAM(S): at 22:31

## 2020-03-24 RX ADMIN — HALOPERIDOL DECANOATE 2.5 MILLIGRAM(S): 100 INJECTION INTRAMUSCULAR at 21:45

## 2020-03-24 RX ADMIN — Medication 2 MILLIGRAM(S): at 14:53

## 2020-03-24 RX ADMIN — DEXMEDETOMIDINE HYDROCHLORIDE IN 0.9% SODIUM CHLORIDE 7.5 MICROGRAM(S)/KG/HR: 4 INJECTION INTRAVENOUS at 05:01

## 2020-03-24 RX ADMIN — CHLORHEXIDINE GLUCONATE 15 MILLILITER(S): 213 SOLUTION TOPICAL at 17:55

## 2020-03-24 NOTE — PROGRESS NOTE ADULT - SUBJECTIVE AND OBJECTIVE BOX
INTERVAL HISTORY:    No seizures, no interval changes  Chart reviewed    VITAL SIGNS:  Vital Signs Last 24 Hrs  T(C): 37.4 (24 Mar 2020 07:00), Max: 37.7 (24 Mar 2020 01:00)  T(F): 99.3 (24 Mar 2020 07:00), Max: 99.9 (24 Mar 2020 01:00)  HR: 70 (24 Mar 2020 09:00) (64 - 98)  BP: 161/84 (24 Mar 2020 09:00) (97/56 - 165/92)  BP(mean): 114 (24 Mar 2020 09:00) (70 - 122)  RR: 13 (24 Mar 2020 09:00) (9 - 22)  SpO2: 99% (24 Mar 2020 09:00) (96% - 100%)    PHYSICAL EXAMINATION:    Mentation:    Language/Speech:  CN:  Visual Fields:  Motor:  Sensory:  DTR:  Babinski:      MEDS:  MEDICATIONS  (STANDING):  albumin human 25% IVPB 50 milliLiter(s) IV Intermittent every 8 hours  chlorhexidine 0.12% Liquid 15 milliLiter(s) Oral Mucosa two times a day  chlorhexidine 4% Liquid 1 Application(s) Topical <User Schedule>  enoxaparin Injectable 40 milliGRAM(s) SubCutaneous daily  furosemide   Injectable 40 milliGRAM(s) IV Push three times a day  haloperidol     Tablet 5 milliGRAM(s) Oral two times a day  levETIRAcetam  IVPB 500 milliGRAM(s) IV Intermittent every 12 hours  piperacillin/tazobactam IVPB.. 3.375 Gram(s) IV Intermittent every 8 hours  sucralfate suspension 1 Gram(s) Oral every 6 hours    MEDICATIONS  (PRN):      LABS:                          11.2   7.14  )-----------( 101      ( 24 Mar 2020 09:21 )             34.0     03-24    134<L>  |  99  |  29.0<H>  ----------------------------<  123<H>  3.6   |  23.0  |  1.19    Ca    7.9<L>      24 Mar 2020 09:21  Phos  3.5     03-24  Mg     2.2     03-24    TPro  6.4<L>  /  Alb  2.6<L>  /  TBili  1.0  /  DBili  x   /  AST  58<H>  /  ALT  30  /  AlkPhos  61  03-24    LIVER FUNCTIONS - ( 24 Mar 2020 09:21 )  Alb: 2.6 g/dL / Pro: 6.4 g/dL / ALK PHOS: 61 U/L / ALT: 30 U/L / AST: 58 U/L / GGT: x           CSF Color: No Color (03-23-20 @ 11:46)  CSF Appearance: Clear (03-23-20 @ 11:46)  CSF Segmented Neutrophils: N/A % (03-23-20 @ 11:46)  Glucose, CSF: 77 mg/dLG/24h (03-23-20 @ 11:46)  Protein, CSF: 143 mg/dL (03-23-20 @ 11:46)      RADIOLOGY & ADDITIONAL STUDIES:    cEEG in progress  LP as above  HSV pcr- neg    IMPRESSION & PLAN:    Slight increase in CSF WBC and protein with normal glucose and neg Cultures so far.  Doubt meningitis. Mild abnormalities can be seen following a seizure   Further antibiotic coverage as per ID  Await cEEG result Continue Keppra for now.

## 2020-03-24 NOTE — PROGRESS NOTE ADULT - SUBJECTIVE AND OBJECTIVE BOX
Patient is a 70y old  Male who presents with a chief complaint of respiratory failure, seizure, aspiration pna (23 Mar 2020 12:08)      HPI/ BRIEF HOSPITAL COURSE:   70 year old male with a pmhx of HTn who presents to [facility] on 3/20 for dizziness. Originally discharged, however presented back several hour later with AMS. Patient then had witnessed seziure, bit tounge, aspirated large amount of blood and was then intubated for airway protection. He then was admitted to ICU for further management.    Events last 24 hours:   S/p LP: Viral PCR and HSV PCR as well as GS neg  S/p improvement in Resp failure and also mentation   S/p Extubation early this AM   Remained hyperactive delirious overnight     PAST MEDICAL & SURGICAL HISTORY:  HTN (hypertension)  No significant past surgical history    Could not obtain ROS due to underlying mentation    Physical Examination:    ICU Vital Signs Last 24 Hrs  T(C): 37.7 (24 Mar 2020 03:00), Max: 37.7 (24 Mar 2020 01:00)  T(F): 99.9 (24 Mar 2020 03:00), Max: 99.9 (24 Mar 2020 01:00)  HR: 77 (24 Mar 2020 03:00) (64 - 111)  BP: 135/74 (24 Mar 2020 03:00) (97/56 - 187/84)  BP(mean): 98 (24 Mar 2020 03:00) (70 - 137)  ABP: --  ABP(mean): --  RR: 13 (24 Mar 2020 03:00) (5 - 27)  SpO2: 100% (24 Mar 2020 03:00) (96% - 100%)      General/Neuro: Awake in constant delirium  moving all extremities and occasionally follows command no obvious deficits but unable to perform complete exam  HEENT: Pupils equal, reactive to light, Oral mucosa moist   PULM: Clear to auscultation bilaterally except decreased at bases, no significant adventitious breath sounds   CVS: Regular rhythm and controlled to increased rate, no murmurs, rubs, or gallops  ABD: Soft, nondistended, nontender, normoactive bowel sounds, no CVA tenderness  EXT: all 4 extremities edema (anasarca), nontender with pedal pulse palpable   SKIN: Warm and well perfused, no acute rashes       Medications:  piperacillin/tazobactam IVPB.. 3.375 Gram(s) IV Intermittent every 8 hours    furosemide   Injectable 40 milliGRAM(s) IV Push three times a day      dexMEDEtomidine Infusion 0.3 MICROgram(s)/kG/Hr IV Continuous <Continuous>  levETIRAcetam  IVPB 500 milliGRAM(s) IV Intermittent every 12 hours      enoxaparin Injectable 40 milliGRAM(s) SubCutaneous daily    pantoprazole  Injectable 40 milliGRAM(s) IV Push daily  sucralfate suspension 1 Gram(s) Oral every 6 hours        albumin human 25% IVPB 50 milliLiter(s) IV Intermittent every 8 hours      chlorhexidine 0.12% Liquid 15 milliLiter(s) Oral Mucosa two times a day  chlorhexidine 4% Liquid 1 Application(s) Topical <User Schedule>        Mode: CPAP with PS  FiO2: 50  PEEP: 5  PS: 5  MAP: 8      I&O's Detail    22 Mar 2020 07:01  -  23 Mar 2020 07:00  --------------------------------------------------------  IN:    Enteral Tube Flush: 120 mL    fentaNYL Infusion.: 65 mL    propofol Infusion: 597 mL    Solution: 500 mL    Solution: 200 mL    Solution: 100 mL  Total IN: 1582 mL    OUT:    Indwelling Catheter - Urethral: 2950 mL  Total OUT: 2950 mL    Total NET: -1368 mL      23 Mar 2020 07:01  -  24 Mar 2020 04:08  --------------------------------------------------------  IN:    dexmedetomidine Infusion: 400 mL    Enteral Tube Flush: 120 mL    fentaNYL Infusion.: 60 mL    Jevity: 610 mL    propofol Infusion: 30 mL    Solution: 100 mL    Solution: 500 mL    Solution: 200 mL  Total IN: 2020 mL    OUT:    Indwelling Catheter - Urethral: 1480 mL  Total OUT: 1480 mL    Total NET: 540 mL    RADIOLOGY/ Microbiology/ Labs: reviewed     I have personally provided 55 minutes of critical care time excluding time spent on seperate procedures

## 2020-03-24 NOTE — PROGRESS NOTE ADULT - SUBJECTIVE AND OBJECTIVE BOX
[facility]  INFECTIOUS DISEASES AND INTERNAL MEDICINE at [place]  =======================================================  Collins Casarez MD  Diplomates American Board of Internal Medicine and Infectious Diseases  Tel: [phone]      Fax: [phone]  =======================================================    SAGE CAMARA [id number]    Follow up: COVID 19    No fevers    s/p extubation    s/p LP 3/23/20    Patient wakes to stimuli but no meaningful communication     Allergies:  No Known Allergies      Antibiotics:  piperacillin/tazobactam IVPB.. 3.375 Gram(s) IV Intermittent every 8 hours        REVIEW OF SYSTEMS:  Unable to obtain due to medical condition       Physical Exam:  ICU Vital Signs Last 24 Hrs  T(C): 37.4 (24 Mar 2020 07:00), Max: 37.7 (24 Mar 2020 01:00)  T(F): 99.3 (24 Mar 2020 07:00), Max: 99.9 (24 Mar 2020 01:00)  HR: 70 (24 Mar 2020 09:00) (64 - 98)  BP: 161/84 (24 Mar 2020 09:00) (97/56 - 165/92)  BP(mean): 114 (24 Mar 2020 09:00) (70 - 122)  RR: 13 (24 Mar 2020 09:00) (9 - 22)  SpO2: 99% (24 Mar 2020 09:00) (96% - 100%)      GEN: Awake, NAD  HEENT: normocephalic and atraumatic. EOMI. PERRL.  Anicteric  NECK: Supple.   LUNGS: Course BS B/L  HEART: Regular rate and rhythm   ABDOMEN: Soft, nontender, and nondistended.  Positive bowel sounds.    : No CVA tenderness  EXTREMITIES: Without any edema.  MSK: No joint swelling  NEUROLOGIC: Awake to stimuli, minimal communication   PSYCHIATRIC: unable to access   SKIN: No Rash      Labs:  03-24    134<L>  |  99  |  29.0<H>  ----------------------------<  123<H>  3.6   |  23.0  |  1.19    Ca    7.9<L>      24 Mar 2020 09:21  Phos  3.5     03-24  Mg     2.2     03-24    TPro  6.4<L>  /  Alb  2.6<L>  /  TBili  1.0  /  DBili  x   /  AST  58<H>  /  ALT  30  /  AlkPhos  61  03-24                          11.2   7.14  )-----------( 101      ( 24 Mar 2020 09:21 )             34.0       LIVER FUNCTIONS - ( 24 Mar 2020 09:21 )  Alb: 2.6 g/dL / Pro: 6.4 g/dL / ALK PHOS: 61 U/L / ALT: 30 U/L / AST: 58 U/L / GGT: x           CARDIAC MARKERS ( 24 Mar 2020 09:21 )  x     / <0.01 ng/mL / x     / x     / x          ABG - ( 23 Mar 2020 10:28 )  pH, Arterial: 7.39  pH, Blood: x     /  pCO2: 42    /  pO2: 83    / HCO3: 24    / Base Excess: 0.2   /  SaO2: 94          Protein, CSF (03.23.20 @ 11:46)    Protein, CSF: 143 mg/dL      Glucose, CSF (03.23.20 @ 11:46)    Glucose, CSF: 77 mg/dLG/24h      Cerebrospinal Fluid Cell Count-1 (03.23.20 @ 11:46)    Total Nucleated Cell Count, CSF: 5 /uL    CSF Color: No Color    CSF Appearance: Clear    CSF Segmented Neutrophils: N/A %      Procalcitonin, Serum: 2.45 ng/mL (03-21-20 @ 04:29)      RECENT CULTURES:  03-23 @ 16:13 .CSF     No growth    No polymorphonuclear cells seen  No organisms seen by cytocentrifuge      03-22 @ 16:32 .Sputum     Normal Respiratory Danay present    Few polymorphonuclear leukocytes per low power field  Rare Squamous epithelial cells per low power field  No organisms seen per oil power field      03-21 @ 17:22 .Blood Coag Negative Staphylococcus  Blood Culture PCR    Growth in anaerobic bottle: Coag Negative Staphylococcus  Anaerobic Bottle: 14:46 Hours to positivity  Aerobic Bottle: No growth to date  ***Blood Panel PCR results on this specimen are available  approximately 3 hours after the Gram stain result.***  Gram stain, PCR, and/or culture results may not always  correspond due to difference in methodologies.  ************************************************************  This PCR assay was performed using [employer].  The following targets are tested for: Enterococcus,  vancomycin resistant enterococci, Listeria monocytogenes,  coagulase negative staphylococci, S. aureus,  methicillin resistant S. aureus, Streptococcus agalactiae  (Group B), S. pneumoniae, S. pyogenes (Group A),  Acinetobacter baumannii, Enterobacter cloacae, E. coli,  Klebsiella oxytoca, K. pneumoniae, Proteus sp.,  Serratia marcescens, Haemophilus influenzae,  Neisseria meningitidis, Pseudomonas aeruginosa, Candida  albicans, C. glabrata, C krusei, C parapsilosis,  C.tropicalis and the KPC resistance gene.      03-21 @ 07:12 .Blood     No growth at 48 hours      03-21 @ 01:24    RVP  NotDetec        < from: Xray Chest 1 View- PORTABLE-Urgent (03.24.20 @ 02:06) >  EXAM:  XR CHEST PORTABLE URGENT 1V                          PROCEDURE DATE:  03/24/2020      INTERPRETATION:  Clinical information: NG tube placement.    Technique: Frontal view of the chest.    Comparison: Prior chest x-ray examination from 3/21/2020.    Findings: The previously noted endotracheal tube is not well evaluated. There is an NG Tube with its tip projecting below the diaphragm.    There has been interval worsening of right-sided interstitial opacities. A few vague left-sided interstitial opacities are noted. The heart size is normal. The visualized osseous structures are unremarkable.    IMPRESSION: Worsening of right-sided airspace disease. Unchanged vague left-sided interstitial opacities    < end of copied text >

## 2020-03-24 NOTE — AIRWAY REMOVAL NOTE  ADULT & PEDS - ARTIFICAL AIRWAY REMOVAL COMMENTS
patient was extubated as per Dr orders. Nurse at bedside during extubation. patient is resting and in no distress at this time. Patient placed on 50% venturi mask

## 2020-03-24 NOTE — PROGRESS NOTE ADULT - ASSESSMENT
1: Acute hypoxic respiratory failure   2: ARDS, severe  3: Systemic including resp viral illness from COVID-19    Patient seen and examined   Code status:   PLan d/w and all questions answered:     Neuro:   C/w Precedex for now, with plan to slow wean down with PRN Meds for hyperactive delirium  Keppra 500 BID with PRN IV Ativan   Pending MRI  CSF suggestive of encephalitis of unclear etiology   ID and Neuro following along  Fall, aspiration and seizure precautions     Cardiovascular:   MAP Target: 65  Serial troponin    Resp/Chest:   s/p extubation to VM-> can be further transitioned to NC      Gi/Nutrition:   Diet: Tube Feeds on hold; if delirium does not improve-> can resume tube feeds through NGT   IV PPI can be stopped   Bowel Regimen as needed    ID:   COVID 19 PCR status: + (C/w current droplet, contact airborne isolation)   RVP: Neg  Cxs: Sputum Neg; Blood CoNS contaminant   S/p Vanco and Acyclovir as CSF RVP Neg  C/w Zosyn for 5-7 days for possible aspiration PNA    Nephro/Electrolyte/Acid-Base:   IV Lasix 40 q8 with albumin for 3 doses total   Possible net neg balance   Avoid nephrotoxic agents  Strict I&O with Cr monitoring   Medications adjusted for   Close Electrolyte monitoring and correction as needed     Endocrinology:   no acute issues     Haem/Oncology:   Mechanical and Chemical DVT ppx    Lines/ Tubes/ Catheters/ devices:   Lei: + for critical I&O's; consider removing in next 12-24 hours

## 2020-03-24 NOTE — PROGRESS NOTE ADULT - ASSESSMENT
71y/o  Male with h/o HTN. Patient presented to ED 3/20 with dizziness and was discharged after evaluation in the ER. He returned to the ER 3/20 with AMS, and had a seizure episode in the ER. Patient was given ativan and during the seizure had a bite to this tongue and aspirated blood.  He was hypoxic and required emergent intubation. Patient was febrile in the ER no leukocytosis. He was admitted to MICU, COVID PCR was sent and is positive. Patient also started on Acyclovir for ? encephalitis given seizure episode.       COVID 19  Acute hypoxic respiratory failure  Seizure episode  AMS  encephalopathy  Positive blood culture  ODESSA      - Blood cultures with CoNS in 1 of 4 bottles, likely contamination  - COVID 19 +  - RVP negative  - CXR with rt sided pneumonia  - UA with no UTI  - Procalcitonin level 2.45  - Continue Zosyn  - D/C Acyclovir  - CSF HSV PCR negative  - CSF PCR negative  - Trend Fever  - Trend Leukocytosis      Will Follow

## 2020-03-25 DIAGNOSIS — G93.40 ENCEPHALOPATHY, UNSPECIFIED: ICD-10-CM

## 2020-03-25 DIAGNOSIS — J12.9 VIRAL PNEUMONIA, UNSPECIFIED: ICD-10-CM

## 2020-03-25 DIAGNOSIS — I10 ESSENTIAL (PRIMARY) HYPERTENSION: ICD-10-CM

## 2020-03-25 DIAGNOSIS — M25.512 PAIN IN LEFT SHOULDER: ICD-10-CM

## 2020-03-25 LAB
ALBUMIN SERPL ELPH-MCNC: 2.7 G/DL — LOW (ref 3.3–5.2)
ALP SERPL-CCNC: 63 U/L — SIGNIFICANT CHANGE UP (ref 40–120)
ALT FLD-CCNC: 50 U/L — HIGH
ANION GAP SERPL CALC-SCNC: 16 MMOL/L — SIGNIFICANT CHANGE UP (ref 5–17)
ANISOCYTOSIS BLD QL: SLIGHT — SIGNIFICANT CHANGE UP
AST SERPL-CCNC: 102 U/L — HIGH
BASOPHILS # BLD AUTO: 0 K/UL — SIGNIFICANT CHANGE UP (ref 0–0.2)
BASOPHILS NFR BLD AUTO: 0 % — SIGNIFICANT CHANGE UP (ref 0–2)
BILIRUB SERPL-MCNC: 2.2 MG/DL — HIGH (ref 0.4–2)
BUN SERPL-MCNC: 27 MG/DL — HIGH (ref 8–20)
CALCIUM SERPL-MCNC: 8 MG/DL — LOW (ref 8.6–10.2)
CHLORIDE SERPL-SCNC: 102 MMOL/L — SIGNIFICANT CHANGE UP (ref 98–107)
CO2 SERPL-SCNC: 23 MMOL/L — SIGNIFICANT CHANGE UP (ref 22–29)
CREAT SERPL-MCNC: 1.1 MG/DL — SIGNIFICANT CHANGE UP (ref 0.5–1.3)
EOSINOPHIL # BLD AUTO: 0.06 K/UL — SIGNIFICANT CHANGE UP (ref 0–0.5)
EOSINOPHIL NFR BLD AUTO: 0.8 % — SIGNIFICANT CHANGE UP (ref 0–6)
GIANT PLATELETS BLD QL SMEAR: PRESENT — SIGNIFICANT CHANGE UP
GLUCOSE SERPL-MCNC: 109 MG/DL — HIGH (ref 70–99)
HCT VFR BLD CALC: 27.7 % — LOW (ref 39–50)
HCT VFR BLD CALC: 28.5 % — LOW (ref 39–50)
HGB BLD-MCNC: 9.1 G/DL — LOW (ref 13–17)
HGB BLD-MCNC: 9.4 G/DL — LOW (ref 13–17)
LYMPHOCYTES # BLD AUTO: 0.71 K/UL — LOW (ref 1–3.3)
LYMPHOCYTES # BLD AUTO: 9.1 % — LOW (ref 13–44)
MACROCYTES BLD QL: SLIGHT — SIGNIFICANT CHANGE UP
MAGNESIUM SERPL-MCNC: 2 MG/DL — SIGNIFICANT CHANGE UP (ref 1.6–2.6)
MANUAL SMEAR VERIFICATION: SIGNIFICANT CHANGE UP
MCHC RBC-ENTMCNC: 27.6 PG — SIGNIFICANT CHANGE UP (ref 27–34)
MCHC RBC-ENTMCNC: 27.7 PG — SIGNIFICANT CHANGE UP (ref 27–34)
MCHC RBC-ENTMCNC: 32.9 GM/DL — SIGNIFICANT CHANGE UP (ref 32–36)
MCHC RBC-ENTMCNC: 33 GM/DL — SIGNIFICANT CHANGE UP (ref 32–36)
MCV RBC AUTO: 83.9 FL — SIGNIFICANT CHANGE UP (ref 80–100)
MCV RBC AUTO: 84.1 FL — SIGNIFICANT CHANGE UP (ref 80–100)
MONOCYTES # BLD AUTO: 0.91 K/UL — HIGH (ref 0–0.9)
MONOCYTES NFR BLD AUTO: 11.6 % — SIGNIFICANT CHANGE UP (ref 2–14)
MYELOCYTES NFR BLD: 0.8 % — HIGH (ref 0–0)
NEUTROPHILS # BLD AUTO: 6.08 K/UL — SIGNIFICANT CHANGE UP (ref 1.8–7.4)
NEUTROPHILS NFR BLD AUTO: 71.1 % — SIGNIFICANT CHANGE UP (ref 43–77)
NEUTS BAND # BLD: 6.6 % — SIGNIFICANT CHANGE UP (ref 0–8)
PHOSPHATE SERPL-MCNC: 3 MG/DL — SIGNIFICANT CHANGE UP (ref 2.4–4.7)
PLAT MORPH BLD: NORMAL — SIGNIFICANT CHANGE UP
PLATELET # BLD AUTO: 127 K/UL — LOW (ref 150–400)
PLATELET # BLD AUTO: 145 K/UL — LOW (ref 150–400)
POTASSIUM SERPL-MCNC: 3.4 MMOL/L — LOW (ref 3.5–5.3)
POTASSIUM SERPL-MCNC: 3.8 MMOL/L — SIGNIFICANT CHANGE UP (ref 3.5–5.3)
POTASSIUM SERPL-SCNC: 3.4 MMOL/L — LOW (ref 3.5–5.3)
POTASSIUM SERPL-SCNC: 3.8 MMOL/L — SIGNIFICANT CHANGE UP (ref 3.5–5.3)
PROT SERPL-MCNC: 6.2 G/DL — LOW (ref 6.6–8.7)
RBC # BLD: 3.3 M/UL — LOW (ref 4.2–5.8)
RBC # BLD: 3.39 M/UL — LOW (ref 4.2–5.8)
RBC # FLD: 14.3 % — SIGNIFICANT CHANGE UP (ref 10.3–14.5)
RBC # FLD: 14.5 % — SIGNIFICANT CHANGE UP (ref 10.3–14.5)
RBC BLD AUTO: ABNORMAL
SODIUM SERPL-SCNC: 141 MMOL/L — SIGNIFICANT CHANGE UP (ref 135–145)
WBC # BLD: 7.36 K/UL — SIGNIFICANT CHANGE UP (ref 3.8–10.5)
WBC # BLD: 7.82 K/UL — SIGNIFICANT CHANGE UP (ref 3.8–10.5)
WBC # FLD AUTO: 7.36 K/UL — SIGNIFICANT CHANGE UP (ref 3.8–10.5)
WBC # FLD AUTO: 7.82 K/UL — SIGNIFICANT CHANGE UP (ref 3.8–10.5)

## 2020-03-25 PROCEDURE — 71045 X-RAY EXAM CHEST 1 VIEW: CPT | Mod: 26

## 2020-03-25 PROCEDURE — 99233 SBSQ HOSP IP/OBS HIGH 50: CPT

## 2020-03-25 PROCEDURE — 73060 X-RAY EXAM OF HUMERUS: CPT | Mod: 26,LT

## 2020-03-25 PROCEDURE — 99232 SBSQ HOSP IP/OBS MODERATE 35: CPT

## 2020-03-25 PROCEDURE — 73090 X-RAY EXAM OF FOREARM: CPT | Mod: 26,LT

## 2020-03-25 PROCEDURE — 93010 ELECTROCARDIOGRAM REPORT: CPT

## 2020-03-25 RX ORDER — POTASSIUM CHLORIDE 20 MEQ
40 PACKET (EA) ORAL ONCE
Refills: 0 | Status: COMPLETED | OUTPATIENT
Start: 2020-03-25 | End: 2020-03-25

## 2020-03-25 RX ORDER — OLANZAPINE 15 MG/1
10 TABLET, FILM COATED ORAL ONCE
Refills: 0 | Status: COMPLETED | OUTPATIENT
Start: 2020-03-25 | End: 2020-03-25

## 2020-03-25 RX ORDER — MORPHINE SULFATE 50 MG/1
2 CAPSULE, EXTENDED RELEASE ORAL EVERY 4 HOURS
Refills: 0 | Status: DISCONTINUED | OUTPATIENT
Start: 2020-03-25 | End: 2020-03-31

## 2020-03-25 RX ORDER — THIAMINE MONONITRATE (VIT B1) 100 MG
500 TABLET ORAL DAILY
Refills: 0 | Status: DISCONTINUED | OUTPATIENT
Start: 2020-03-25 | End: 2020-03-25

## 2020-03-25 RX ORDER — DEXMEDETOMIDINE HYDROCHLORIDE IN 0.9% SODIUM CHLORIDE 4 UG/ML
0.5 INJECTION INTRAVENOUS
Qty: 200 | Refills: 0 | Status: DISCONTINUED | OUTPATIENT
Start: 2020-03-25 | End: 2020-03-25

## 2020-03-25 RX ORDER — FOLIC ACID 0.8 MG
1 TABLET ORAL DAILY
Refills: 0 | Status: DISCONTINUED | OUTPATIENT
Start: 2020-03-25 | End: 2020-03-26

## 2020-03-25 RX ORDER — POTASSIUM CHLORIDE 20 MEQ
40 PACKET (EA) ORAL ONCE
Refills: 0 | Status: DISCONTINUED | OUTPATIENT
Start: 2020-03-25 | End: 2020-03-25

## 2020-03-25 RX ORDER — METOPROLOL TARTRATE 50 MG
25 TABLET ORAL EVERY 8 HOURS
Refills: 0 | Status: DISCONTINUED | OUTPATIENT
Start: 2020-03-25 | End: 2020-03-27

## 2020-03-25 RX ORDER — ACETAMINOPHEN 500 MG
650 TABLET ORAL EVERY 6 HOURS
Refills: 0 | Status: DISCONTINUED | OUTPATIENT
Start: 2020-03-25 | End: 2020-04-05

## 2020-03-25 RX ORDER — VALPROIC ACID (AS SODIUM SALT) 250 MG/5ML
500 SOLUTION, ORAL ORAL EVERY 8 HOURS
Refills: 0 | Status: DISCONTINUED | OUTPATIENT
Start: 2020-03-25 | End: 2020-04-05

## 2020-03-25 RX ORDER — THIAMINE MONONITRATE (VIT B1) 100 MG
200 TABLET ORAL DAILY
Refills: 0 | Status: COMPLETED | OUTPATIENT
Start: 2020-03-25 | End: 2020-03-27

## 2020-03-25 RX ORDER — OLANZAPINE 15 MG/1
10 TABLET, FILM COATED ORAL DAILY
Refills: 0 | Status: DISCONTINUED | OUTPATIENT
Start: 2020-03-25 | End: 2020-03-25

## 2020-03-25 RX ORDER — LABETALOL HCL 100 MG
10 TABLET ORAL EVERY 6 HOURS
Refills: 0 | Status: DISCONTINUED | OUTPATIENT
Start: 2020-03-25 | End: 2020-03-25

## 2020-03-25 RX ADMIN — Medication 650 MILLIGRAM(S): at 14:25

## 2020-03-25 RX ADMIN — CHLORHEXIDINE GLUCONATE 15 MILLILITER(S): 213 SOLUTION TOPICAL at 06:54

## 2020-03-25 RX ADMIN — Medication 1 GRAM(S): at 11:19

## 2020-03-25 RX ADMIN — Medication 25 MILLIGRAM(S): at 15:47

## 2020-03-25 RX ADMIN — Medication 102 MILLIGRAM(S): at 18:34

## 2020-03-25 RX ADMIN — Medication 40 MILLIEQUIVALENT(S): at 06:54

## 2020-03-25 RX ADMIN — PIPERACILLIN AND TAZOBACTAM 25 GRAM(S): 4; .5 INJECTION, POWDER, LYOPHILIZED, FOR SOLUTION INTRAVENOUS at 06:54

## 2020-03-25 RX ADMIN — Medication 10 MILLIGRAM(S): at 15:59

## 2020-03-25 RX ADMIN — Medication 40 MILLIEQUIVALENT(S): at 09:37

## 2020-03-25 RX ADMIN — DEXMEDETOMIDINE HYDROCHLORIDE IN 0.9% SODIUM CHLORIDE 12.5 MICROGRAM(S)/KG/HR: 4 INJECTION INTRAVENOUS at 09:36

## 2020-03-25 RX ADMIN — Medication 650 MILLIGRAM(S): at 13:32

## 2020-03-25 RX ADMIN — PIPERACILLIN AND TAZOBACTAM 25 GRAM(S): 4; .5 INJECTION, POWDER, LYOPHILIZED, FOR SOLUTION INTRAVENOUS at 13:07

## 2020-03-25 RX ADMIN — Medication 1 GRAM(S): at 06:54

## 2020-03-25 RX ADMIN — MORPHINE SULFATE 2 MILLIGRAM(S): 50 CAPSULE, EXTENDED RELEASE ORAL at 18:42

## 2020-03-25 RX ADMIN — Medication 1 GRAM(S): at 18:32

## 2020-03-25 RX ADMIN — MORPHINE SULFATE 2 MILLIGRAM(S): 50 CAPSULE, EXTENDED RELEASE ORAL at 18:22

## 2020-03-25 RX ADMIN — Medication 40 MILLIEQUIVALENT(S): at 15:46

## 2020-03-25 RX ADMIN — OLANZAPINE 10 MILLIGRAM(S): 15 TABLET, FILM COATED ORAL at 18:37

## 2020-03-25 RX ADMIN — HALOPERIDOL DECANOATE 2 MILLIGRAM(S): 100 INJECTION INTRAMUSCULAR at 13:25

## 2020-03-25 RX ADMIN — Medication 650 MILLIGRAM(S): at 20:54

## 2020-03-25 RX ADMIN — Medication 25 MILLIGRAM(S): at 23:45

## 2020-03-25 RX ADMIN — CHLORHEXIDINE GLUCONATE 15 MILLILITER(S): 213 SOLUTION TOPICAL at 17:07

## 2020-03-25 RX ADMIN — DEXMEDETOMIDINE HYDROCHLORIDE IN 0.9% SODIUM CHLORIDE 12.5 MICROGRAM(S)/KG/HR: 4 INJECTION INTRAVENOUS at 04:03

## 2020-03-25 RX ADMIN — CHLORHEXIDINE GLUCONATE 1 APPLICATION(S): 213 SOLUTION TOPICAL at 03:02

## 2020-03-25 RX ADMIN — LEVETIRACETAM 500 MILLIGRAM(S): 250 TABLET, FILM COATED ORAL at 06:54

## 2020-03-25 RX ADMIN — QUETIAPINE FUMARATE 25 MILLIGRAM(S): 200 TABLET, FILM COATED ORAL at 06:54

## 2020-03-25 RX ADMIN — Medication 650 MILLIGRAM(S): at 21:24

## 2020-03-25 RX ADMIN — Medication 10 MILLIGRAM(S): at 01:13

## 2020-03-25 RX ADMIN — HALOPERIDOL DECANOATE 2 MILLIGRAM(S): 100 INJECTION INTRAMUSCULAR at 01:13

## 2020-03-25 RX ADMIN — Medication 10 MILLIGRAM(S): at 20:53

## 2020-03-25 RX ADMIN — ENOXAPARIN SODIUM 40 MILLIGRAM(S): 100 INJECTION SUBCUTANEOUS at 11:19

## 2020-03-25 RX ADMIN — Medication 2 MILLIGRAM(S): at 01:54

## 2020-03-25 RX ADMIN — Medication 1 GRAM(S): at 23:45

## 2020-03-25 RX ADMIN — Medication 27.5 MILLIGRAM(S): at 23:45

## 2020-03-25 NOTE — PROGRESS NOTE ADULT - ASSESSMENT
69y/o  Male with h/o HTN. Patient presented to ED 3/20 with dizziness and was discharged after evaluation in the ER. He returned to the ER 3/20 with AMS, and had a seizure episode in the ER. Patient was given ativan and during the seizure had a bite to this tongue and aspirated blood.  He was hypoxic and required emergent intubation. Patient was febrile in the ER no leukocytosis. He was admitted to MICU, COVID PCR was sent and is positive. Patient also started on Acyclovir for ? encephalitis given seizure episode.       COVID 19  encephalopathy  Seizure episode  AMS  Positive blood culture  ODESSA      - Blood cultures with CoNS in 1 of 4 bottles, likely contamination  - COVID 19 +  - RVP negative  - CXR with rt sided pneumonia  - UA with no UTI  - Procalcitonin level 2.45  - Continue Zosyn till 3/27/20  - D/C Acyclovir  - CSF HSV PCR negative  - CSF PCR negative  - Called lab, unable to do COVID CSF PCR  - Trend Fever  - Trend Leukocytosis      Will Follow

## 2020-03-25 NOTE — PROGRESS NOTE ADULT - PROBLEM SELECTOR PLAN 1
Improving but remains quite encephalopathic.  When on breathing trial became tachypneic and hypoxic and required resedation and back on assist control
Present on admission   CXR showing right lung opacity.   Likely secondary to COVID-19   Zithromax 500mg iv qd

## 2020-03-25 NOTE — PROGRESS NOTE ADULT - SUBJECTIVE AND OBJECTIVE BOX
CC:  Seizure , encephalopathy in covid-19 positive patient.  Confused agitated. Left arm swelling and shoulder motion tenderness. Fever Tmax 101.5 Resp failure resolved. Extubated. Uncontrolled hypertension  HPI:  Pt is a 70 YOM h/o HTN who presented to ED yesterday with dizziness and was discharged.  Pt represents to ED today with AMS, dizziness. Pt is poor historian.  He had a witnessed seizure in ER and received Ativan 4mg IVP.  Upon my arrival in ED pt had bitten his tongue and aspirated on blood.  He was hypoxic and required emergent intubation/see intubation note.  Pt unable to offer any further information.  No family available at this time for more history. (20 Mar 2020 21:38)    REVIEW OF SYSTEMS:    Patient denied fever, chills, abdominal pain, nausea, vomiting, cough, shortness of breath, chest pain or palpitations    Vital Signs Last 24 Hrs  T(C): 37.8 (25 Mar 2020 16:00), Max: 38.6 (24 Mar 2020 20:00)  T(F): 100 (25 Mar 2020 16:00), Max: 101.5 (24 Mar 2020 20:00)  HR: 94 (25 Mar 2020 17:00) (85 - 121)  BP: 172/129 (25 Mar 2020 17:00) (116/56 - 185/122)  BP(mean): 145 (25 Mar 2020 17:00) (80 - 145)  RR: 27 (25 Mar 2020 17:00) (13 - 36)  SpO2: 99% (25 Mar 2020 17:00) (89% - 100%)I&O's Summary    24 Mar 2020 07:01  -  25 Mar 2020 07:00  --------------------------------------------------------  IN: 300 mL / OUT: 3975 mL / NET: -3675 mL    25 Mar 2020 07:01  -  25 Mar 2020 17:59  --------------------------------------------------------  IN: 563.9 mL / OUT: 835 mL / NET: -271.1 mL      PHYSICAL EXAM:  GENERAL: NAD,   HEENT: PERRL, +EOMI, anicteric, no [place]  NECK: Supple, No JVD   CHEST/LUNG: CTA bilaterally; Normal effort  HEART: S1S2 Normal intensity, no murmurs, gallops or rubs noted  ABDOMEN: Soft, BS Normoactive, NT, ND, no HSM noted  EXTREMITIES:  Left arm edema. Left shoulder motion tenderness. No pedal edema noted  SKIN: No rashes or lesions noted. Left forearm edema and dorsum blisters .   NEURO: Confused, agitated , no focal deficits noted, CN II-XII intact  PSYCH: Delirium, depressed  mood and affect; insight/judgement inappropriate  LABS:                        9.4    7.36  )-----------( 145      ( 25 Mar 2020 14:23 )             28.5     03-25    x   |  x   |  x   ----------------------------<  x   3.8   |  x   |  x     Ca    8.0<L>      25 Mar 2020 05:27  Phos  3.0     03-25  Mg     2.0     03-25    TPro  6.2<L>  /  Alb  2.7<L>  /  TBili  2.2<H>  /  DBili  x   /  AST  102<H>  /  ALT  50<H>  /  AlkPhos  63  03-25        RADIOLOGY & ADDITIONAL TESTS:    MEDICATIONS:  MEDICATIONS  (STANDING):  chlorhexidine 0.12% Liquid 15 milliLiter(s) Oral Mucosa two times a day  chlorhexidine 4% Liquid 1 Application(s) Topical <User Schedule>  dexMEDEtomidine Infusion 0.5 MICROgram(s)/kG/Hr (12.5 mL/Hr) IV Continuous <Continuous>  enoxaparin Injectable 40 milliGRAM(s) SubCutaneous daily  folic acid Injectable 1 milliGRAM(s) IV Push daily  metoprolol tartrate 25 milliGRAM(s) Oral every 8 hours  OLANZapine Injectable 10 milliGRAM(s) IntraMuscular daily  piperacillin/tazobactam IVPB.. 3.375 Gram(s) IV Intermittent every 8 hours  sucralfate suspension 1 Gram(s) Oral every 6 hours  thiamine IVPB 500 milliGRAM(s) IV Intermittent daily  valproate sodium IVPB 500 milliGRAM(s) IV Intermittent every 8 hours    MEDICATIONS  (PRN):  acetaminophen    Suspension .. 650 milliGRAM(s) Oral every 6 hours PRN Temp greater or equal to 38.5C (101.3F)  labetalol Injectable 10 milliGRAM(s) IV Push every 4 hours PRN Systolic blood pressure > 160  LORazepam   Injectable 2 milliGRAM(s) IV Push every 6 hours PRN Agitation  morphine  - Injectable 2 milliGRAM(s) IV Push every 4 hours PRN Severe Pain (7 - 10)

## 2020-03-25 NOTE — PROGRESS NOTE ADULT - PROBLEM SELECTOR PLAN 2
unclear etiology.  first seizure in his life.  daughter denied headache prior or fevers, but did have cough and dizziness.  Feel the patient would benefit from CSF sampling; however, the wife was unable to be reached and the daughter refused consent at the moment and wants to discuss with family prior to allowing.  Meanwhile, will empirically treat; continue piperacillin/tazobactam and add vancomycin and acyclovir.
Now extubated and tolerating oxygen via NC  Continuous pulse ox monitoring

## 2020-03-25 NOTE — PROGRESS NOTE ADULT - PROBLEM SELECTOR PLAN 4
unclear etiology; could be metabolic/from aspiration pneumonia; could be from delayed metabolism of medications; could be prolonged postictal state; may require additional imaging; definitely will benefit from MRI after EEG leads off.
Labetalol IV with parameters

## 2020-03-25 NOTE — PROGRESS NOTE ADULT - PROBLEM SELECTOR PROBLEM 3
Aspiration pneumonia of both lower lobes, unspecified aspiration pneumonia type
Acute respiratory failure with hypoxia
Seizure

## 2020-03-25 NOTE — PROGRESS NOTE ADULT - SUBJECTIVE AND OBJECTIVE BOX
[facility]  INFECTIOUS DISEASES AND INTERNAL MEDICINE at [place]  =======================================================  Collins Casarez MD  Diplomates American Board of Internal Medicine and Infectious Diseases  Tel: [phone]      Fax: [phone]  =======================================================    SAGE [place] [id number]    Follow up: COVID 19    No fevers    s/p extubation    s/p LP 3/23/20    Patient wakes to stimuli but no meaningful communication     No more fevers    Allergies:  No Known Allergies      Antibiotics:  piperacillin/tazobactam IVPB.. 3.375 Gram(s) IV Intermittent every 8 hours       REVIEW OF SYSTEMS:  Unable to obtain due to medical condition       Physical Exam:  ICU Vital Signs Last 24 Hrs  T(C): 37.6 (25 Mar 2020 07:00), Max: 38.6 (24 Mar 2020 20:00)  T(F): 99.7 (25 Mar 2020 07:00), Max: 101.5 (24 Mar 2020 20:00)  HR: 85 (25 Mar 2020 09:44) (78 - 118)  BP: 144/85 (25 Mar 2020 09:44) (116/56 - 181/79)  BP(mean): 112 (25 Mar 2020 09:00) (80 - 132)  RR: 19 (25 Mar 2020 09:44) (13 - 31)  SpO2: 99% (25 Mar 2020 09:44) (89% - 100%)      GEN: Awake, NAD  HEENT: normocephalic and atraumatic. EOMI. PERRL.  Anicteric  NECK: Supple.   LUNGS: Course BS B/L  HEART: Regular rate and rhythm   ABDOMEN: Soft, nontender, and nondistended.  Positive bowel sounds.    : No CVA tenderness  EXTREMITIES: Without any edema.  MSK: No joint swelling  NEUROLOGIC: Awake to stimuli, minimal communication   PSYCHIATRIC: unable to access   SKIN: No Rash      Labs:  03-25    141  |  102  |  27.0<H>  ----------------------------<  109<H>  3.4<L>   |  23.0  |  1.10    Ca    8.0<L>      25 Mar 2020 05:27  Phos  3.0     03-25  Mg     2.0     03-25    TPro  6.2<L>  /  Alb  2.7<L>  /  TBili  2.2<H>  /  DBili  x   /  AST  102<H>  /  ALT  50<H>  /  AlkPhos  63  03-25                          9.1    7.82  )-----------( 127      ( 25 Mar 2020 05:27 )             27.7     LIVER FUNCTIONS - ( 25 Mar 2020 05:27 )  Alb: 2.7 g/dL / Pro: 6.2 g/dL / ALK PHOS: 63 U/L / ALT: 50 U/L / AST: 102 U/L / GGT: x           CARDIAC MARKERS ( 24 Mar 2020 09:21 )  x     / <0.01 ng/mL / x     / x     / x          ABG - ( 23 Mar 2020 10:28 )  pH, Arterial: 7.39  pH, Blood: x     /  pCO2: 42    /  pO2: 83    / HCO3: 24    / Base Excess: 0.2   /  SaO2: 94            Protein, CSF (03.23.20 @ 11:46)    Protein, CSF: 143 mg/dL      Glucose, CSF (03.23.20 @ 11:46)    Glucose, CSF: 77 mg/dLG/24h      Cerebrospinal Fluid Cell Count-1 (03.23.20 @ 11:46)    Total Nucleated Cell Count, CSF: 5 /uL    CSF Color: No Color    CSF Appearance: Clear    CSF Segmented Neutrophils: N/A %      Procalcitonin, Serum: 2.45 ng/mL (03-21-20 @ 04:29)      RECENT CULTURES:  03-23 @ 16:13 .CSF     No growth  No polymorphonuclear cells seen  No organisms seen by cytocentrifuge      CSF PCR Panel (03.23.20 @ 15:39)    CSF PCR Result: NotDetec: The meningitis/encephalitis (ME) panel (CSFPCR) is a PCR based assay that  screens for: Escherichia coli; Haemophilus influenzae; Listeria  monocytogenes; Neisseria meningitidis; Streptococcus agalactiae;  Streptococcus pneumoniae; CMV; Enterovirus; HSV-1; HSV-2; Human  herpesvirus 6; Parechovirus; VZV and Cryptococcus. Result should be  interpreted in context of clinical presentation, imaging and other lab  tests. Positive predictive value may be lower in patients with normal CSF  chemistry and cell count.      03-22 @ 16:32 .Sputum     Normal Respiratory Danay present  Few polymorphonuclear leukocytes per low power field  Rare Squamous epithelial cells per low power field  No organisms seen per oil power field      03-21 @ 17:22 .Blood Coag Negative Staphylococcus  Blood Culture PCR    Growth in anaerobic bottle: Coag Negative Staphylococcus  Anaerobic Bottle: 14:46 Hours to positivity  Aerobic Bottle: No growth to date  ***Blood Panel PCR results on this specimen are available  approximately 3 hours after the Gram stain result.***  Gram stain, PCR, and/or culture results may not always  correspond due to difference in methodologies.  ************************************************************  This PCR assay was performed using [employer].  The following targets are tested for: Enterococcus,  vancomycin resistant enterococci, Listeria monocytogenes,  coagulase negative staphylococci, S. aureus,  methicillin resistant S. aureus, Streptococcus agalactiae  (Group B), S. pneumoniae, S. pyogenes (Group A),  Acinetobacter baumannii, Enterobacter cloacae, E. coli,  Klebsiella oxytoca, K. pneumoniae, Proteus sp.,  Serratia marcescens, Haemophilus influenzae,  Neisseria meningitidis, Pseudomonas aeruginosa, Candida  albicans, C. glabrata, C krusei, C parapsilosis,  C.tropicalis and the KPC resistance gene.      03-21 @ 07:12 .Blood     No growth at 48 hours      03-21 @ 01:24    [facility]  NotDete      COVID-19 PCR (03.21.20 @ 14:21)    COVID-19 PCR: Detected: LDT - Laboratory Developed Test All “detected” results on this new test  are considered presumptively positive results, are clinically actionable,  and specimens will be forwarded to CDC for confirmation testing.  Another report (corrected report) will only be issued if discordant  results occur.  This test has been validated by [email or web address] to be accurate;  though it has not been FDA cleared/approved by the usual pathway.  As with all laboratory tests, results should be correlated with clinical  findings.          < from: Xray Chest 1 View- PORTABLE-Urgent (03.24.20 @ 02:06) >  EXAM:  XR CHEST PORTABLE URGENT 1V                          PROCEDURE DATE:  03/24/2020      INTERPRETATION:  Clinical information: NG tube placement.    Technique: Frontal view of the chest.    Comparison: Prior chest x-ray examination from 3/21/2020.    Findings: The previously noted endotracheal tube is not well evaluated. There is an NG Tube with its tip projecting below the diaphragm.    There has been interval worsening of right-sided interstitial opacities. A few vague left-sided interstitial opacities are noted. The heart size is normal. The visualized osseous structures are unremarkable.    IMPRESSION: Worsening of right-sided airspace disease. Unchanged vague left-sided interstitial opacities    < end of copied text >

## 2020-03-25 NOTE — PROGRESS NOTE ADULT - ASSESSMENT
70 yr male  with fever seizure confusion and encephalopathy with positive COVID-19.  Acute resp failure for COVID-19 resolved , extubed and tolerating oxygen via nasal cannula

## 2020-03-25 NOTE — PROGRESS NOTE ADULT - ASSESSMENT
70 year old male with PMH HTN. 3/20 p/w AMS and dizziness. Witnessed seizure while in ED, intubated for hypoxemia. A/w acute hypoxic respiratory failure, ARDS, severe, Systemic including resp viral illness from COVID-19, and viral encephalitis (likely d/t COVID-19)      Neuro:   remains agitated but redirectable off precedex  continue seroquel and PRN haldol  Keppra 500 BID with PRN IV Ativan   Pending MRI  CSF suggestive of encephalitis of unclear etiology (likely COVID-19)  ID and Neuro following along  Fall, aspiration and seizure precautions   1:1 for safety    Cardiovascular:   restarted beta-blocker for HR and BP control    Resp/Chest:   oxygenating well on room air    Gi/Nutrition:   Diet:   continue TF via NGT     ID:   COVID 19 PCR status: + (C/w current droplet, contact airborne isolation)   vanco and acyclovir d/c'd as CSF RVP Neg  C/w Zosyn for 5-7 days for possible aspiration PNA    Nephro/Electrolyte/Acid-Base:   hypokalemia, potassium replaced      Endocrinology:   no acute issues     Haem/Oncology:   Mechanical and Chemical DVT ppx    Lines/ Tubes/ Catheters/ devices:   d/c esteban    Disposition:  case d/w Dr Vance who has agreed to take over care of this patient  transferred to medicine service 70 year old male with PMH HTN. 3/20 p/w AMS and dizziness. Witnessed seizure while in ED, intubated for hypoxemia. A/w acute hypoxic respiratory failure, ARDS, severe, Systemic including resp viral illness from COVID-19, and viral encephalitis (likely d/t COVID-19)      Neuro:   remains agitated but redirectable off precedex  continue seroquel and PRN haldol  Keppra 500 BID with PRN IV Ativan   Pending MRI  CSF suggestive of encephalitis of unclear etiology (likely COVID-19)  ID and Neuro following along  Fall, aspiration and seizure precautions   1:1 for safety    Cardiovascular:   restarted beta-blocker for HR and BP control    Resp/Chest:   oxygenating well on room air    Gi/Nutrition:   Diet:   continue TF via NGT     ID:   COVID 19 PCR status: + (C/w current droplet, contact airborne isolation)   vanco and acyclovir d/c'd as CSF RVP Neg  C/w Zosyn for 5-7 days for possible aspiration PNA    Nephro/Electrolyte/Acid-Base:   hypokalemia, potassium replaced      Endocrinology:   no acute issues     Haem/Oncology:   Mechanical and Chemical DVT ppx  anemic but stable    Lines/ Tubes/ Catheters/ devices:   d/c orellana    Disposition:  case d/w Dr Vance who has agreed to take over care of this patient  transferred to medicine service 70 year old male with PMH HTN. 3/20 p/w AMS and dizziness. Witnessed seizure while in ED, intubated for hypoxemia. A/w acute hypoxic respiratory failure, ARDS, severe, Systemic including resp viral illness from COVID-19, and viral encephalitis (likely d/t COVID-19)      Neuro:   remains agitated but redirectable off precedex  continue seroquel and PRN haldol  Keppra 500 BID with PRN IV Ativan   Pending MRI  CSF suggestive of encephalitis of unclear etiology (likely COVID-19)  ID and Neuro following along  Fall, aspiration and seizure precautions   1:1 for safety    Cardiovascular:   restarted beta-blocker (metoprolol) for HR and BP control  labetalol PRN (if requires reeval for up titration of metoprolol)  may need ARB restarted for HTN    Resp/Chest:   extubated 3/24 @ 0030 hrs  oxygenating well on room air  continue CPT q6H and NT suction PRN    GI/Nutrition:   continue TF via NGT     ID:   COVID 19 PCR status: + (C/w current droplet, contact airborne isolation)   vanco and acyclovir d/c'd as CSF RVP Neg  C/w Zosyn for 5-7 days for possible aspiration PNA    Nephro/Electrolyte/Acid-Base:   hypokalemia, potassium replaced      Endocrinology:   no acute issues     Haem/Oncology:   Mechanical and Chemical DVT ppx  anemic but stable    Ortho:  humerus and forearm x-rays negative for fx  consulted to evaluate left should collection as seen on LUE duplex  if aspiratable recommend sending for cx     Lines/ Tubes/ Catheters/ devices:   d/c orellana    Disposition:  case d/w Dr Vance who has agreed to take over care of this patient  transferred to medicine service 70 year old male with PMH HTN. 3/20 p/w AMS and dizziness. Witnessed seizure while in ED, intubated for hypoxemia. A/w acute hypoxic respiratory failure, ARDS, severe, Systemic including resp viral illness from COVID-19, and viral encephalitis (likely d/t COVID-19)      Neuro:   remains agitated but redirectable off precedex  continue seroquel and PRN haldol  monitor QTc while on above meds  Keppra 500 BID with PRN IV Ativan   Pending MRI  CSF suggestive of encephalitis of unclear etiology (likely COVID-19)  ID and Neuro following along  Fall, aspiration and seizure precautions   1:1 for safety    Cardiovascular:   restarted beta-blocker (metoprolol) for HR and BP control  labetalol PRN (if requires reeval for up titration of metoprolol)  may need ARB restarted for HTN    Resp/Chest:   extubated 3/24 @ 0030 hrs  oxygenating well on room air  continue CPT q6H and NT suction PRN    GI/Nutrition:   continue TF via NGT     ID:   COVID 19 PCR status: + (C/w current droplet, contact airborne isolation)   vanco and acyclovir d/c'd as CSF RVP Neg  C/w Zosyn for 5-7 days for possible aspiration PNA    Nephro/Electrolyte/Acid-Base:   hypokalemia, potassium replaced      Endocrinology:   no acute issues     Haem/Oncology:   Mechanical and Chemical DVT ppx  anemic but stable    Ortho:  humerus and forearm x-rays negative for fx  consulted to evaluate left should collection as seen on LUE duplex  if aspiratable recommend sending for cx     Lines/ Tubes/ Catheters/ devices:   d/c orellana    Disposition:  case d/w Dr Vance who has agreed to take over care of this patient  transferred to medicine service

## 2020-03-25 NOTE — PROGRESS NOTE ADULT - SUBJECTIVE AND OBJECTIVE BOX
Brief Hospital Course:   3/19 to ED w/ c/o dizziness, seen and d/c'd home.   3/20 represented to ED with AMS. 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    Significant recent/past 24 hr events:   remains encephalopathic, tolerating room air, downgraded to medicine  vascular consulted for LUE edema  ortho consulted to evaluate for aspiration of L shoulder joint fluid collection as seen on LUE duplex      Subjective:    Review of Systems    Unable to obtain due to: intubated & sedated altered mental status _______________    ROS negative x 10 systems except as noted above      Patient is a 70y old  Male who presents with a chief complaint of respiratory failure, seizure, aspiration pna (25 Mar 2020 13:26)    HPI:  Pt is a 70 YOM h/o HTN who presented to ED yesterday with dizziness and was discharged.  Pt represents to ED today with AMS, dizziness. Pt is poor historian.  He had a witnessed seizure in ER and received Ativan 4mg IVP.  Upon my arrival in ED pt had bitten his tongue and aspirated on blood.  He was hypoxic and required emergent intubation/see intubation note.  Pt unable to offer any further information.  No family available at this time for more history. (20 Mar 2020 21:38)    PAST MEDICAL & SURGICAL HISTORY:  HTN (hypertension)  No significant past surgical history    FAMILY HISTORY:      Vitals   ICU Vital Signs Last 24 Hrs  T(C): 38.1 (25 Mar 2020 14:00), Max: 38.6 (24 Mar 2020 20:00)  T(F): 100.6 (25 Mar 2020 14:00), Max: 101.5 (24 Mar 2020 20:00)  HR: 121 (25 Mar 2020 15:00) (85 - 121), ST  BP: 183/84 (25 Mar 2020 15:00) (116/56 - 185/122)  BP(mean): 126 (25 Mar 2020 15:00) (80 - 140)  RR: 27 (25 Mar 2020 15:00) (13 - 36)  SpO2: 99% (25 Mar 2020 15:00) (89% - 100%) on room air      I&O's Detail    24 Mar 2020 07:01  -  25 Mar 2020 07:00  --------------------------------------------------------  IN:    Albumin 25%: 100 mL    dexmedetomidine Infusion: 75 mL    Solution: 125 mL  Total IN: 300 mL    OUT:    Indwelling Catheter - Urethral: 3975 mL  Total OUT: 3975 mL    Total NET: -3675 mL    25 Mar 2020 07:01  -  25 Mar 2020 15:32  --------------------------------------------------------  IN:    dexmedetomidine Infusion: 25.9 mL    Enteral Tube Flush: 120 mL    Solution: 125 mL  Total IN: 270.9 mL    OUT:    Indwelling Catheter - Urethral: 525 mL  Total OUT: 525 mL    Total NET: -254.1 mL      LABS                        9.4    7.36  )-----------( 145      ( 25 Mar 2020 14:23 )             28.5     Comprehensive Metabolic Panel in AM (03.25.20 @ 05:27)    Sodium, Serum: 141 mmol/L    Potassium, Serum: 3.4 mmol/L    Chloride, Serum: 102 mmol/L    Carbon Dioxide, Serum: 23.0 mmol/L    Anion Gap, Serum: 16 mmol/L    Blood Urea Nitrogen, Serum: 27.0 mg/dL    Creatinine, Serum: 1.10 mg/dL    Glucose, Serum: 109: Reference Range for Glucose has been amended as of 1/21/2020 mg/dL    Calcium, Total Serum: 8.0 mg/dL    Protein Total, Serum: 6.2 g/dL    Albumin, Serum: 2.7 g/dL    Bilirubin Total, Serum: 2.2 mg/dL    Alkaline Phosphatase, Serum: 63 U/L    Aspartate Aminotransferase (AST/SGOT): 102 U/L    Alanine Aminotransferase (ALT/SGPT): 50 U/L        < from: Xray Forearm, Left (03.25.20 @ 13:20) >  IMPRESSION: No acute fracture or dislocation.  < end of copied text >    < from: Xray Humerus, Left (03.25.20 @ 13:19) >  IMPRESSION: No acute fracture or dislocation.   < end of copied text >    < from: Xray Chest 1 View- PORTABLE-Urgent (03.24.20 @ 02:06) >  IMPRESSION: Worsening of right-sided airspace disease. Unchanged vague left-sided interstitial opacities.  < end of copied text >    < from: US Duplex Venous Upper Ext Ltd, Left (03.23.20 @ 09:02) >  IMPRESSION:   No evidence of left upper extremity deep venous thrombosis. The left cephalic vein and left ulnar vein are not visualized.  There is a complex fluid collection medial aspect of the left shoulder measuring 3.7 x 1.6 x 2.0 cm.  < end of copied text >      MEDICATIONS  (STANDING):  chlorhexidine 0.12% Liquid 15 milliLiter(s) Oral Mucosa two times a day  chlorhexidine 4% Liquid 1 Application(s) Topical <User Schedule>  enoxaparin Injectable 40 milliGRAM(s) SubCutaneous daily  levETIRAcetam  Solution 500 milliGRAM(s) Oral two times a day  metoprolol tartrate 25 milliGRAM(s) Oral every 8 hours  piperacillin/tazobactam IVPB.. 3.375 Gram(s) IV Intermittent every 8 hours  potassium chloride   Powder 40 milliEquivalent(s) Oral once  QUEtiapine 25 milliGRAM(s) Oral two times a day  sucralfate suspension 1 Gram(s) Oral every 6 hours    MEDICATIONS  (PRN):  acetaminophen    Suspension .. 650 milliGRAM(s) Oral every 6 hours PRN Temp greater or equal to 38.5C (101.3F)  haloperidol    Injectable 2 milliGRAM(s) IV Push every 4 hours PRN agitation  labetalol Injectable 10 milliGRAM(s) IV Push every 4 hours PRN Systolic blood pressure > 160    No Known Allergies      Physical Exam:       Code Status: full code      Time spent: 40 minutes (includes patient assessment, examination, discharge planning, coordination of care, patient and family education and counseling) Brief Hospital Course:   3/19 to ED w/ c/o dizziness, seen and d/c'd home.   3/20 represented to ED with AMS. 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  Pt represents to ED today with AMS, dizziness. Pt is poor historian.  He had a witnessed seizure in ER and received Ativan 4mg IVP.  Upon my arrival in ED pt had bitten his tongue and aspirated on blood.  He was hypoxic and required emergent intubation/see intubation note.  Pt unable to offer any further information.  No family available at this time for more history. (20 Mar 2020 21:38)    PAST MEDICAL & SURGICAL HISTORY:  HTN (hypertension)  No significant past surgical history    FAMILY HISTORY:      Vitals   ICU Vital Signs Last 24 Hrs  T(C): 38.1 (25 Mar 2020 14:00), Max: 38.6 (24 Mar 2020 20:00)  T(F): 100.6 (25 Mar 2020 14:00), Max: 101.5 (24 Mar 2020 20:00)  HR: 121 (25 Mar 2020 15:00) (85 - 121), ST  BP: 183/84 (25 Mar 2020 15:00) (116/56 - 185/122)  BP(mean): 126 (25 Mar 2020 15:00) (80 - 140)  RR: 27 (25 Mar 2020 15:00) (13 - 36)  SpO2: 99% (25 Mar 2020 15:00) (89% - 100%) on room air      I&O's Detail    24 Mar 2020 07:01  -  25 Mar 2020 07:00  --------------------------------------------------------  IN:    Albumin 25%: 100 mL    dexmedetomidine Infusion: 75 mL    Solution: 125 mL  Total IN: 300 mL    OUT:    Indwelling Catheter - Urethral: 3975 mL  Total OUT: 3975 mL    Total NET: -3675 mL    25 Mar 2020 07:01  -  25 Mar 2020 15:32  --------------------------------------------------------  IN:    dexmedetomidine Infusion: 25.9 mL    Enteral Tube Flush: 120 mL    Solution: 125 mL  Total IN: 270.9 mL    OUT:    Indwelling Catheter - Urethral: 525 mL  Total OUT: 525 mL    Total NET: -254.1 mL      LABS                        9.4    7.36  )-----------( 145      ( 25 Mar 2020 14:23 )             28.5     Comprehensive Metabolic Panel in AM (03.25.20 @ 05:27)    Sodium, Serum: 141 mmol/L    Potassium, Serum: 3.4 mmol/L    Chloride, Serum: 102 mmol/L    Carbon Dioxide, Serum: 23.0 mmol/L    Anion Gap, Serum: 16 mmol/L    Blood Urea Nitrogen, Serum: 27.0 mg/dL    Creatinine, Serum: 1.10 mg/dL    Glucose, Serum: 109: Reference Range for Glucose has been amended as of 1/21/2020 mg/dL    Calcium, Total Serum: 8.0 mg/dL    Protein Total, Serum: 6.2 g/dL    Albumin, Serum: 2.7 g/dL    Bilirubin Total, Serum: 2.2 mg/dL    Alkaline Phosphatase, Serum: 63 U/L    Aspartate Aminotransferase (AST/SGOT): 102 U/L    Alanine Aminotransferase (ALT/SGPT): 50 U/L        < from: Xray Forearm, Left (03.25.20 @ 13:20) >  IMPRESSION: No acute fracture or dislocation.  < end of copied text >    < from: Xray Humerus, Left (03.25.20 @ 13:19) >  IMPRESSION: No acute fracture or dislocation.   < end of copied text >    < from: Xray Chest 1 View- PORTABLE-Urgent (03.24.20 @ 02:06) >  IMPRESSION: Worsening of right-sided airspace disease. Unchanged vague left-sided interstitial opacities.  < end of copied text >    < from: US Duplex Venous Upper Ext Ltd, Left (03.23.20 @ 09:02) >  IMPRESSION:   No evidence of left upper extremity deep venous thrombosis. The left cephalic vein and left ulnar vein are not visualized.  There is a complex fluid collection medial aspect of the left shoulder measuring 3.7 x 1.6 x 2.0 cm.  < end of copied text >      MEDICATIONS  (STANDING):  chlorhexidine 0.12% Liquid 15 milliLiter(s) Oral Mucosa two times a day  chlorhexidine 4% Liquid 1 Application(s) Topical <User Schedule>  enoxaparin Injectable 40 milliGRAM(s) SubCutaneous daily  levETIRAcetam  Solution 500 milliGRAM(s) Oral two times a day  metoprolol tartrate 25 milliGRAM(s) Oral every 8 hours  piperacillin/tazobactam IVPB.. 3.375 Gram(s) IV Intermittent every 8 hours  potassium chloride   Powder 40 milliEquivalent(s) Oral once  QUEtiapine 25 milliGRAM(s) Oral two times a day  sucralfate suspension 1 Gram(s) Oral every 6 hours    MEDICATIONS  (PRN):  acetaminophen    Suspension .. 650 milliGRAM(s) Oral every 6 hours PRN Temp greater or equal to 38.5C (101.3F)  haloperidol    Injectable 2 milliGRAM(s) IV Push every 4 hours PRN agitation  labetalol Injectable 10 milliGRAM(s) IV Push every 4 hours PRN Systolic blood pressure > 160    No Known Allergies      Physical Exam:   Neuro: awake but oriented x 0, repeats what is asked of him, follows commands  HEENT: PERRL, + NGT, + thick secretions  CV: tachycardic, regular, +S1S2  Pulm: diminished throughout  GI: soft, NT, ND, hypoactive BS  : orellana to bedside drainage  Ext: JOYCE x 4 though LUE ROM limited. Pt c/o tenderness to upper left arm when palpated. Significant edema noted and blisters to left hand. Now elastic bandage wrapped and elevated. + L radial pulse. 1+ BLE edema.      Code Status: full code      Time spent: 40 minutes (includes patient assessment, examination, discharge planning, coordination of care, patient and family education and counseling) Brief Hospital Course:   3/19 to ED w/ c/o dizziness, seen and d/c'd home.   3/20 represented to ED with AMS. 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  Pt represents to ED today with AMS, dizziness. Pt is poor historian.  He had a witnessed seizure in ER and received Ativan 4mg IVP.  Upon my arrival in ED pt had bitten his tongue and aspirated on blood.  He was hypoxic and required emergent intubation/see intubation note.  Pt unable to offer any further information.  No family available at this time for more history. (20 Mar 2020 21:38)    PAST MEDICAL & SURGICAL HISTORY:  HTN (hypertension)  No significant past surgical history    FAMILY HISTORY:      Vitals   ICU Vital Signs Last 24 Hrs  T(C): 38.1 (25 Mar 2020 14:00), Max: 38.6 (24 Mar 2020 20:00)  T(F): 100.6 (25 Mar 2020 14:00), Max: 101.5 (24 Mar 2020 20:00)  HR: 121 (25 Mar 2020 15:00) (85 - 121), ST  BP: 183/84 (25 Mar 2020 15:00) (116/56 - 185/122)  BP(mean): 126 (25 Mar 2020 15:00) (80 - 140)  RR: 27 (25 Mar 2020 15:00) (13 - 36)  SpO2: 99% (25 Mar 2020 15:00) (89% - 100%) on room air      I&O's Detail    24 Mar 2020 07:01  -  25 Mar 2020 07:00  --------------------------------------------------------  IN:    Albumin 25%: 100 mL    dexmedetomidine Infusion: 75 mL    Solution: 125 mL  Total IN: 300 mL    OUT:    Indwelling Catheter - Urethral: 3975 mL  Total OUT: 3975 mL    Total NET: -3675 mL    25 Mar 2020 07:01  -  25 Mar 2020 15:32  --------------------------------------------------------  IN:    dexmedetomidine Infusion: 25.9 mL    Enteral Tube Flush: 120 mL    Solution: 125 mL  Total IN: 270.9 mL    OUT:    Indwelling Catheter - Urethral: 525 mL  Total OUT: 525 mL    Total NET: -254.1 mL      LABS                        9.4    7.36  )-----------( 145      ( 25 Mar 2020 14:23 )             28.5     Comprehensive Metabolic Panel in AM (03.25.20 @ 05:27)    Sodium, Serum: 141 mmol/L    Potassium, Serum: 3.4 mmol/L    Chloride, Serum: 102 mmol/L    Carbon Dioxide, Serum: 23.0 mmol/L    Anion Gap, Serum: 16 mmol/L    Blood Urea Nitrogen, Serum: 27.0 mg/dL    Creatinine, Serum: 1.10 mg/dL    Glucose, Serum: 109: Reference Range for Glucose has been amended as of 1/21/2020 mg/dL    Calcium, Total Serum: 8.0 mg/dL    Protein Total, Serum: 6.2 g/dL    Albumin, Serum: 2.7 g/dL    Bilirubin Total, Serum: 2.2 mg/dL    Alkaline Phosphatase, Serum: 63 U/L    Aspartate Aminotransferase (AST/SGOT): 102 U/L    Alanine Aminotransferase (ALT/SGPT): 50 U/L        < from: Xray Forearm, Left (03.25.20 @ 13:20) >  IMPRESSION: No acute fracture or dislocation.  < end of copied text >    < from: Xray Humerus, Left (03.25.20 @ 13:19) >  IMPRESSION: No acute fracture or dislocation.   < end of copied text >    < from: Xray Chest 1 View- PORTABLE-Urgent (03.24.20 @ 02:06) >  IMPRESSION: Worsening of right-sided airspace disease. Unchanged vague left-sided interstitial opacities.  < end of copied text >    < from: US Duplex Venous Upper Ext Ltd, Left (03.23.20 @ 09:02) >  IMPRESSION:   No evidence of left upper extremity deep venous thrombosis. The left cephalic vein and left ulnar vein are not visualized.  There is a complex fluid collection medial aspect of the left shoulder measuring 3.7 x 1.6 x 2.0 cm.  < end of copied text >      3/25/2020 @ 0811 hrs ECG (my read): sinus arrythmia @ 88, no acute ST or T wave changes, QTc 452      MEDICATIONS  (STANDING):  chlorhexidine 0.12% Liquid 15 milliLiter(s) Oral Mucosa two times a day  chlorhexidine 4% Liquid 1 Application(s) Topical <User Schedule>  enoxaparin Injectable 40 milliGRAM(s) SubCutaneous daily  levETIRAcetam  Solution 500 milliGRAM(s) Oral two times a day  metoprolol tartrate 25 milliGRAM(s) Oral every 8 hours  piperacillin/tazobactam IVPB.. 3.375 Gram(s) IV Intermittent every 8 hours  potassium chloride   Powder 40 milliEquivalent(s) Oral once  QUEtiapine 25 milliGRAM(s) Oral two times a day  sucralfate suspension 1 Gram(s) Oral every 6 hours    MEDICATIONS  (PRN):  acetaminophen    Suspension .. 650 milliGRAM(s) Oral every 6 hours PRN Temp greater or equal to 38.5C (101.3F)  haloperidol    Injectable 2 milliGRAM(s) IV Push every 4 hours PRN agitation  labetalol Injectable 10 milliGRAM(s) IV Push every 4 hours PRN Systolic blood pressure > 160    No Known Allergies      Physical Exam:   Neuro: awake but oriented x 0, repeats what is asked of him, follows commands  HEENT: PERRL, + NGT, + thick secretions  CV: tachycardic, regular, +S1S2  Pulm: diminished throughout  GI: soft, NT, ND, hypoactive BS  : orellana to bedside drainage  Ext: JOYCE x 4 though LUE ROM limited. Pt c/o tenderness to upper left arm when palpated. Significant edema noted and blisters to left hand. Now elastic bandage wrapped and elevated. + L radial pulse. 1+ BLE edema.      Code Status: full code      Time spent: 40 minutes (includes patient assessment, examination, discharge planning, coordination of care, patient and family education and counseling)

## 2020-03-25 NOTE — PROGRESS NOTE ADULT - SUBJECTIVE AND OBJECTIVE BOX
INTERVAL HISTORY:        VITAL SIGNS:  Vital Signs Last 24 Hrs  T(C): 37.9 (25 Mar 2020 13:00), Max: 38.6 (24 Mar 2020 20:00)  T(F): 100.2 (25 Mar 2020 13:00), Max: 101.5 (24 Mar 2020 20:00)  HR: 114 (25 Mar 2020 13:00) (83 - 118)  BP: 167/100 (25 Mar 2020 13:00) (116/56 - 185/122)  BP(mean): 126 (25 Mar 2020 13:00) (80 - 140)  RR: 22 (25 Mar 2020 13:00) (13 - 36)  SpO2: 95% (25 Mar 2020 13:00) (89% - 100%)    PHYSICAL EXAMINATION:    Mentation:    Language/Speech:  CN:  Visual Fields:  Motor:  Sensory:  DTR:  Babinski:      MEDS:  MEDICATIONS  (STANDING):  chlorhexidine 0.12% Liquid 15 milliLiter(s) Oral Mucosa two times a day  chlorhexidine 4% Liquid 1 Application(s) Topical <User Schedule>  dexMEDEtomidine Infusion 0.5 MICROgram(s)/kG/Hr (12.5 mL/Hr) IV Continuous <Continuous>  enoxaparin Injectable 40 milliGRAM(s) SubCutaneous daily  levETIRAcetam  Solution 500 milliGRAM(s) Oral two times a day  piperacillin/tazobactam IVPB.. 3.375 Gram(s) IV Intermittent every 8 hours  QUEtiapine 25 milliGRAM(s) Oral two times a day  sucralfate suspension 1 Gram(s) Oral every 6 hours    MEDICATIONS  (PRN):  acetaminophen    Suspension .. 650 milliGRAM(s) Oral every 6 hours PRN Temp greater or equal to 38.5C (101.3F)  haloperidol    Injectable 2 milliGRAM(s) IV Push every 4 hours PRN agitation  labetalol Injectable 10 milliGRAM(s) IV Push every 4 hours PRN Systolic blood pressure > 160      LABS:                          9.1    7.82  )-----------( 127      ( 25 Mar 2020 05:27 )             27.7     03-25    141  |  102  |  27.0<H>  ----------------------------<  109<H>  3.4<L>   |  23.0  |  1.10    Ca    8.0<L>      25 Mar 2020 05:27  Phos  3.0     03-25  Mg     2.0     03-25    TPro  6.2<L>  /  Alb  2.7<L>  /  TBili  2.2<H>  /  DBili  x   /  AST  102<H>  /  ALT  50<H>  /  AlkPhos  63  03-25    LIVER FUNCTIONS - ( 25 Mar 2020 05:27 )  Alb: 2.7 g/dL / Pro: 6.2 g/dL / ALK PHOS: 63 U/L / ALT: 50 U/L / AST: 102 U/L / GGT: x               RADIOLOGY & ADDITIONAL STUDIES:        IMPRESSION & PLAN:    Report of seizures in setting of Covid and other medical issues as outlined  EEG was neg for seizure activity.   CSF non-specific  CT- neg x 2      REC: MRI would be of potential utility given report of seizure  and CSF findings, and delirum INTERVAL HISTORY:  Chart reviewed,       VITAL SIGNS:  Vital Signs Last 24 Hrs  T(C): 37.9 (25 Mar 2020 13:00), Max: 38.6 (24 Mar 2020 20:00)  T(F): 100.2 (25 Mar 2020 13:00), Max: 101.5 (24 Mar 2020 20:00)  HR: 114 (25 Mar 2020 13:00) (83 - 118)  BP: 167/100 (25 Mar 2020 13:00) (116/56 - 185/122)  BP(mean): 126 (25 Mar 2020 13:00) (80 - 140)  RR: 22 (25 Mar 2020 13:00) (13 - 36)  SpO2: 95% (25 Mar 2020 13:00) (89% - 100%)    PHYSICAL EXAMINATION:    Mentation:    Language/Speech:  CN:  Visual Fields:  Motor:  Sensory:  DTR:  Babinski:      MEDS:  MEDICATIONS  (STANDING):  chlorhexidine 0.12% Liquid 15 milliLiter(s) Oral Mucosa two times a day  chlorhexidine 4% Liquid 1 Application(s) Topical <User Schedule>  dexMEDEtomidine Infusion 0.5 MICROgram(s)/kG/Hr (12.5 mL/Hr) IV Continuous <Continuous>  enoxaparin Injectable 40 milliGRAM(s) SubCutaneous daily  levETIRAcetam  Solution 500 milliGRAM(s) Oral two times a day  piperacillin/tazobactam IVPB.. 3.375 Gram(s) IV Intermittent every 8 hours  QUEtiapine 25 milliGRAM(s) Oral two times a day  sucralfate suspension 1 Gram(s) Oral every 6 hours    MEDICATIONS  (PRN):  acetaminophen    Suspension .. 650 milliGRAM(s) Oral every 6 hours PRN Temp greater or equal to 38.5C (101.3F)  haloperidol    Injectable 2 milliGRAM(s) IV Push every 4 hours PRN agitation  labetalol Injectable 10 milliGRAM(s) IV Push every 4 hours PRN Systolic blood pressure > 160      LABS:                          9.1    7.82  )-----------( 127      ( 25 Mar 2020 05:27 )             27.7     03-25    141  |  102  |  27.0<H>  ----------------------------<  109<H>  3.4<L>   |  23.0  |  1.10    Ca    8.0<L>      25 Mar 2020 05:27  Phos  3.0     03-25  Mg     2.0     03-25    TPro  6.2<L>  /  Alb  2.7<L>  /  TBili  2.2<H>  /  DBili  x   /  AST  102<H>  /  ALT  50<H>  /  AlkPhos  63  03-25    LIVER FUNCTIONS - ( 25 Mar 2020 05:27 )  Alb: 2.7 g/dL / Pro: 6.2 g/dL / ALK PHOS: 63 U/L / ALT: 50 U/L / AST: 102 U/L / GGT: x               RADIOLOGY & ADDITIONAL STUDIES:        IMPRESSION & PLAN:    Report of seizures in setting of Covid and other medical issues as outlined  EEG was neg for seizure activity.   CSF non-specific  CT- neg x 2      REC: MRI would be of potential utility given report of seizure  and CSF findings, and persistent delirium

## 2020-03-25 NOTE — PROGRESS NOTE ADULT - PROBLEM SELECTOR PLAN 3
empiric antibiotics.  Ordered sputum culture
Unclear etiology.    May be secondary to Covid cerebritis or encephalitis   CSF studies is non specific   Vancyclovir is discontinued   Depakote 500mg iv q8  Neurology is following   EKG show no epileptiform pattern   Eventual brain MRI seizure protocol when agitations resolve or is controlle d

## 2020-03-25 NOTE — PROGRESS NOTE ADULT - PROBLEM SELECTOR PLAN 6
With motion tenderness and edema of entire left upper ext  To obtain left shoulder xray, CT  And recall ortho.

## 2020-03-25 NOTE — CONSULT NOTE ADULT - SUBJECTIVE AND OBJECTIVE BOX
Vascular Attending:  Dr Florian      HPI:  Pt is a 70 YOM h/o HTN who presented to ED yesterday with dizziness and was discharged.  Pt represents to ED today with AMS, dizziness. Pt is poor historian.  He had a witnessed seizure in ER and received Ativan 4mg IVP.  Upon my arrival in ED pt had bitten his tongue and aspirated on blood.  He was hypoxic and required emergent intubation/see intubation note.  Pt unable to offer any further information.  No family available at this time for more history. (20 Mar 2020 21:38)    Hospital course sig for COVID+. Pt extubated currently and noted with LUE edema and pain. Blistering L hand and forearm. Vascular surgery consulted.     PAST MEDICAL & SURGICAL HISTORY:  HTN (hypertension)  No significant past surgical history      REVIEW OF SYSTEMS-unable to obtain due to altered mental status  General:	    Skin/Breast:  	  Ophthalmologic:  	  ENMT:	    Respiratory and Thorax:  	  Cardiovascular:	    Gastrointestinal:	    Genitourinary:	    Musculoskeletal:	    Neurological:	    Psychiatric:	    Hematology/Lymphatics:	    Endocrine:	    Allergic/Immunologic:	    MEDICATIONS  (STANDING):  chlorhexidine 0.12% Liquid 15 milliLiter(s) Oral Mucosa two times a day  chlorhexidine 4% Liquid 1 Application(s) Topical <User Schedule>  dexMEDEtomidine Infusion 0.5 MICROgram(s)/kG/Hr (12.5 mL/Hr) IV Continuous <Continuous>  enoxaparin Injectable 40 milliGRAM(s) SubCutaneous daily  levETIRAcetam  Solution 500 milliGRAM(s) Oral two times a day  piperacillin/tazobactam IVPB.. 3.375 Gram(s) IV Intermittent every 8 hours  QUEtiapine 25 milliGRAM(s) Oral two times a day  sucralfate suspension 1 Gram(s) Oral every 6 hours    MEDICATIONS  (PRN):  haloperidol    Injectable 2 milliGRAM(s) IV Push every 4 hours PRN agitation  labetalol Injectable 10 milliGRAM(s) IV Push every 4 hours PRN Systolic blood pressure > 160      Allergies  No Known Allergies    Vital Signs Last 24 Hrs  T(C): 37.8 (25 Mar 2020 11:00), Max: 38.6 (24 Mar 2020 20:00)  T(F): 100 (25 Mar 2020 11:00), Max: 101.5 (24 Mar 2020 20:00)  HR: 106 (25 Mar 2020 12:35) (83 - 118)  BP: 164/75 (25 Mar 2020 12:35) (116/56 - 185/122)  BP(mean): 108 (25 Mar 2020 12:35) (80 - 140)  RR: 26 (25 Mar 2020 12:35) (13 - 36)  SpO2: 95% (25 Mar 2020 12:35) (89% - 100%)    PHYSICAL EXAM:  Constitutional: lethargic but arousable  Respiratory: scattered upper airway rhonci  Cardiovascular: normal S1, S2  Extremities: LUE edema starting at shoulder. Compartments soft. No palp axillary nodes, no palp chords, no erythema. Bullae noted on dorsum of hand with erupted bullae at wrist and forearm.  Vascular: 2+ brachial and radial pulses  Neurological: weak hand grasp L; unable to lift L arm against gravity      LABS:                        9.1    7.82  )-----------( 127      ( 25 Mar 2020 05:27 )             27.7     03-25    141  |  102  |  27.0<H>  ----------------------------<  109<H>  3.4<L>   |  23.0  |  1.10    Ca    8.0<L>      25 Mar 2020 05:27  Phos  3.0     03-25  Mg     2.0     03-25    TPro  6.2<L>  /  Alb  2.7<L>  /  TBili  2.2<H>  /  DBili  x   /  AST  102<H>  /  ALT  50<H>  /  AlkPhos  63  03-25      RADIOLOGY & ADDITIONAL STUDIES  < from: US Duplex Venous Upper Ext Ltd, Left (03.23.20 @ 09:02) >   EXAM:  US DPLX UPR EXT VEINS LTD LT                          PROCEDURE DATE:  03/23/2020          INTERPRETATION:  CLINICAL INFORMATION: Arm swelling    COMPARISON: None available.    TECHNIQUE: Duplex sonography of the LEFT UPPER extremity veins with color and spectral Doppler, with and without compression.      FINDINGS:    There is a complex fluid collection medial aspect of the left shoulder measuring 3.7 x 1.6 x 2.0 cm.    The left internal jugular, subclavian, axillary and brachial veinsare patent and compressible where applicable.  The basilic vein is patent and without thrombus. The left cephalic vein and left ulnar vein are not visualized.    Doppler examination shows normal spontaneous and phasic flow.    IMPRESSION:     No evidence of left upper extremity deep venous thrombosis. The left cephalic vein and left ulnar vein are not visualized.    There is a complex fluid collection medial aspect of the left shoulder measuring 3.7 x 1.6 x 2.0 cm.    < end of copied text >    Impression and Plan: 71 y/o M with h/o HTN, who presents with AMS. Hypoxic requiring intubation and found to be COVID +. Now extubated. Noted with edema and increased pain in LUE  No evidence for compartment syndrome  No evidence for arterial compromise.  No evidence for DVT  ACE wrap LUE and elevate  OT consult  No vascular surgical intervention at this time.

## 2020-03-25 NOTE — PROVIDER CONTACT NOTE (EICU) - ACTION/TREATMENT ORDERED:
-have entered AM labs for this patient including CBC, BMP, magnesium and phosphorous levels  -will CTM for results and replete as per [place] standard
-will place order for tylenol suspension x 1
As per [place] electrolyte standard will give 40 MEQS of KCL solution via NGT

## 2020-03-26 LAB
ALBUMIN SERPL ELPH-MCNC: 2.5 G/DL — LOW (ref 3.3–5.2)
ALP SERPL-CCNC: 67 U/L — SIGNIFICANT CHANGE UP (ref 40–120)
ALT FLD-CCNC: 64 U/L — HIGH
ANION GAP SERPL CALC-SCNC: 13 MMOL/L — SIGNIFICANT CHANGE UP (ref 5–17)
AST SERPL-CCNC: 99 U/L — HIGH
BASOPHILS # BLD AUTO: 0.02 K/UL — SIGNIFICANT CHANGE UP (ref 0–0.2)
BASOPHILS NFR BLD AUTO: 0.3 % — SIGNIFICANT CHANGE UP (ref 0–2)
BILIRUB SERPL-MCNC: 1.3 MG/DL — SIGNIFICANT CHANGE UP (ref 0.4–2)
BUN SERPL-MCNC: 26 MG/DL — HIGH (ref 8–20)
CALCIUM SERPL-MCNC: 8.2 MG/DL — LOW (ref 8.6–10.2)
CHLORIDE SERPL-SCNC: 107 MMOL/L — SIGNIFICANT CHANGE UP (ref 98–107)
CO2 SERPL-SCNC: 22 MMOL/L — SIGNIFICANT CHANGE UP (ref 22–29)
CREAT SERPL-MCNC: 1.14 MG/DL — SIGNIFICANT CHANGE UP (ref 0.5–1.3)
CULTURE RESULTS: SIGNIFICANT CHANGE UP
CULTURE RESULTS: SIGNIFICANT CHANGE UP
EOSINOPHIL # BLD AUTO: 0.06 K/UL — SIGNIFICANT CHANGE UP (ref 0–0.5)
EOSINOPHIL NFR BLD AUTO: 1 % — SIGNIFICANT CHANGE UP (ref 0–6)
GLUCOSE SERPL-MCNC: 127 MG/DL — HIGH (ref 70–99)
HCT VFR BLD CALC: 32 % — LOW (ref 39–50)
HGB BLD-MCNC: 10.2 G/DL — LOW (ref 13–17)
IMM GRANULOCYTES NFR BLD AUTO: 2.2 % — HIGH (ref 0–1.5)
LYMPHOCYTES # BLD AUTO: 0.9 K/UL — LOW (ref 1–3.3)
LYMPHOCYTES # BLD AUTO: 14.4 % — SIGNIFICANT CHANGE UP (ref 13–44)
MAGNESIUM SERPL-MCNC: 2.4 MG/DL — SIGNIFICANT CHANGE UP (ref 1.6–2.6)
MCHC RBC-ENTMCNC: 27.6 PG — SIGNIFICANT CHANGE UP (ref 27–34)
MCHC RBC-ENTMCNC: 31.9 GM/DL — LOW (ref 32–36)
MCV RBC AUTO: 86.5 FL — SIGNIFICANT CHANGE UP (ref 80–100)
MONOCYTES # BLD AUTO: 1.04 K/UL — HIGH (ref 0–0.9)
MONOCYTES NFR BLD AUTO: 16.7 % — HIGH (ref 2–14)
NEUTROPHILS # BLD AUTO: 4.08 K/UL — SIGNIFICANT CHANGE UP (ref 1.8–7.4)
NEUTROPHILS NFR BLD AUTO: 65.4 % — SIGNIFICANT CHANGE UP (ref 43–77)
PHOSPHATE SERPL-MCNC: 3.2 MG/DL — SIGNIFICANT CHANGE UP (ref 2.4–4.7)
PLATELET # BLD AUTO: 161 K/UL — SIGNIFICANT CHANGE UP (ref 150–400)
POTASSIUM SERPL-MCNC: 4.1 MMOL/L — SIGNIFICANT CHANGE UP (ref 3.5–5.3)
POTASSIUM SERPL-SCNC: 4.1 MMOL/L — SIGNIFICANT CHANGE UP (ref 3.5–5.3)
PROT SERPL-MCNC: 6.5 G/DL — LOW (ref 6.6–8.7)
RBC # BLD: 3.7 M/UL — LOW (ref 4.2–5.8)
RBC # FLD: 14.9 % — HIGH (ref 10.3–14.5)
SODIUM SERPL-SCNC: 142 MMOL/L — SIGNIFICANT CHANGE UP (ref 135–145)
SPECIMEN SOURCE: SIGNIFICANT CHANGE UP
SPECIMEN SOURCE: SIGNIFICANT CHANGE UP
WBC # BLD: 6.24 K/UL — SIGNIFICANT CHANGE UP (ref 3.8–10.5)
WBC # FLD AUTO: 6.24 K/UL — SIGNIFICANT CHANGE UP (ref 3.8–10.5)

## 2020-03-26 PROCEDURE — 93010 ELECTROCARDIOGRAM REPORT: CPT

## 2020-03-26 PROCEDURE — 99232 SBSQ HOSP IP/OBS MODERATE 35: CPT

## 2020-03-26 PROCEDURE — 73030 X-RAY EXAM OF SHOULDER: CPT | Mod: 26,LT

## 2020-03-26 PROCEDURE — 99233 SBSQ HOSP IP/OBS HIGH 50: CPT

## 2020-03-26 RX ORDER — OLANZAPINE 15 MG/1
5 TABLET, FILM COATED ORAL
Refills: 0 | Status: DISCONTINUED | OUTPATIENT
Start: 2020-03-26 | End: 2020-04-07

## 2020-03-26 RX ORDER — FOLIC ACID 0.8 MG
1 TABLET ORAL DAILY
Refills: 0 | Status: DISCONTINUED | OUTPATIENT
Start: 2020-03-26 | End: 2020-04-07

## 2020-03-26 RX ORDER — ASCORBIC ACID 60 MG
500 TABLET,CHEWABLE ORAL DAILY
Refills: 0 | Status: DISCONTINUED | OUTPATIENT
Start: 2020-03-26 | End: 2020-04-07

## 2020-03-26 RX ORDER — ACETAMINOPHEN 500 MG
1000 TABLET ORAL ONCE
Refills: 0 | Status: COMPLETED | OUTPATIENT
Start: 2020-03-26 | End: 2020-03-26

## 2020-03-26 RX ADMIN — MORPHINE SULFATE 2 MILLIGRAM(S): 50 CAPSULE, EXTENDED RELEASE ORAL at 18:27

## 2020-03-26 RX ADMIN — Medication 1 GRAM(S): at 17:58

## 2020-03-26 RX ADMIN — Medication 1 MILLIGRAM(S): at 17:57

## 2020-03-26 RX ADMIN — Medication 2 MILLIGRAM(S): at 03:28

## 2020-03-26 RX ADMIN — Medication 1 TABLET(S): at 18:27

## 2020-03-26 RX ADMIN — Medication 400 MILLIGRAM(S): at 01:36

## 2020-03-26 RX ADMIN — Medication 27.5 MILLIGRAM(S): at 09:55

## 2020-03-26 RX ADMIN — MORPHINE SULFATE 2 MILLIGRAM(S): 50 CAPSULE, EXTENDED RELEASE ORAL at 06:43

## 2020-03-26 RX ADMIN — Medication 25 MILLIGRAM(S): at 09:55

## 2020-03-26 RX ADMIN — Medication 650 MILLIGRAM(S): at 11:36

## 2020-03-26 RX ADMIN — PIPERACILLIN AND TAZOBACTAM 25 GRAM(S): 4; .5 INJECTION, POWDER, LYOPHILIZED, FOR SOLUTION INTRAVENOUS at 11:30

## 2020-03-26 RX ADMIN — OLANZAPINE 5 MILLIGRAM(S): 15 TABLET, FILM COATED ORAL at 17:57

## 2020-03-26 RX ADMIN — MORPHINE SULFATE 2 MILLIGRAM(S): 50 CAPSULE, EXTENDED RELEASE ORAL at 18:51

## 2020-03-26 RX ADMIN — Medication 1000 MILLIGRAM(S): at 02:06

## 2020-03-26 RX ADMIN — Medication 500 MILLIGRAM(S): at 18:27

## 2020-03-26 RX ADMIN — PIPERACILLIN AND TAZOBACTAM 25 GRAM(S): 4; .5 INJECTION, POWDER, LYOPHILIZED, FOR SOLUTION INTRAVENOUS at 00:35

## 2020-03-26 RX ADMIN — CHLORHEXIDINE GLUCONATE 1 APPLICATION(S): 213 SOLUTION TOPICAL at 05:12

## 2020-03-26 RX ADMIN — Medication 27.5 MILLIGRAM(S): at 21:28

## 2020-03-26 RX ADMIN — Medication 650 MILLIGRAM(S): at 17:59

## 2020-03-26 RX ADMIN — Medication 102 MILLIGRAM(S): at 11:30

## 2020-03-26 RX ADMIN — MORPHINE SULFATE 2 MILLIGRAM(S): 50 CAPSULE, EXTENDED RELEASE ORAL at 06:13

## 2020-03-26 RX ADMIN — Medication 25 MILLIGRAM(S): at 17:57

## 2020-03-26 RX ADMIN — PIPERACILLIN AND TAZOBACTAM 25 GRAM(S): 4; .5 INJECTION, POWDER, LYOPHILIZED, FOR SOLUTION INTRAVENOUS at 17:57

## 2020-03-26 RX ADMIN — CHLORHEXIDINE GLUCONATE 15 MILLILITER(S): 213 SOLUTION TOPICAL at 05:12

## 2020-03-26 RX ADMIN — Medication 650 MILLIGRAM(S): at 12:06

## 2020-03-26 RX ADMIN — Medication 1 GRAM(S): at 11:30

## 2020-03-26 RX ADMIN — Medication 10 MILLIGRAM(S): at 21:32

## 2020-03-26 RX ADMIN — CHLORHEXIDINE GLUCONATE 15 MILLILITER(S): 213 SOLUTION TOPICAL at 17:57

## 2020-03-26 RX ADMIN — Medication 2 MILLIGRAM(S): at 11:45

## 2020-03-26 RX ADMIN — ENOXAPARIN SODIUM 40 MILLIGRAM(S): 100 INJECTION SUBCUTANEOUS at 22:13

## 2020-03-26 RX ADMIN — Medication 25 MILLIGRAM(S): at 21:14

## 2020-03-26 RX ADMIN — Medication 650 MILLIGRAM(S): at 18:29

## 2020-03-26 RX ADMIN — Medication 1 GRAM(S): at 05:13

## 2020-03-26 NOTE — PROCEDURE NOTE - NSPROCDETAILS_GEN_ALL_CORE
sterile dressing applied/sterile technique, catheter placed/ultrasound guidance/location identified, draped/prepped, sterile technique used/supine position/ultrasound assessment
CSF Obtained/area cleaned in sterile fashion/location identified, draped/prepped, sterile technique used, needle inserted/introduced

## 2020-03-26 NOTE — PROGRESS NOTE ADULT - SUBJECTIVE AND OBJECTIVE BOX
CC:   HPI:  Pt is a 70 YOM h/o HTN who presented to ED yesterday with dizziness and was discharged.  Pt represents to ED today with AMS, dizziness. Pt is poor historian.  He had a witnessed seizure in ER and received Ativan 4mg IVP.  Upon my arrival in ED pt had bitten his tongue and aspirated on blood.  He was hypoxic and required emergent intubation/see intubation note.  Pt unable to offer any further information.  No family available at this time for more history. (20 Mar 2020 21:38)    REVIEW OF SYSTEMS:    Patient denied fever, chills, abdominal pain, nausea, vomiting, cough, shortness of breath, chest pain or palpitations    Vital Signs Last 24 Hrs  T(C): 38.2 (26 Mar 2020 09:52), Max: 38.4 (26 Mar 2020 00:17)  T(F): 100.8 (26 Mar 2020 09:52), Max: 101.2 (26 Mar 2020 00:17)  HR: 101 (26 Mar 2020 09:52) (94 - 121)  BP: 158/70 (26 Mar 2020 09:52) (153/68 - 185/87)  BP(mean): 105 (25 Mar 2020 21:00) (105 - 145)  RR: 19 (26 Mar 2020 09:52) (16 - 33)  SpO2: 98% (26 Mar 2020 09:52) (90% - 100%)I&O's Summary    25 Mar 2020 07:01  -  26 Mar 2020 07:00  --------------------------------------------------------  IN: 810.9 mL / OUT: 960 mL / NET: -149.1 mL    26 Mar 2020 07:01  -  26 Mar 2020 13:21  --------------------------------------------------------  IN: 310 mL / OUT: 0 mL / NET: 310 mL      PHYSICAL EXAM:  GENERAL: NAD, well-groomed  HEENT: PERRL, +EOMI, anicteric, no [place]  NECK: Supple, No JVD   CHEST/LUNG: CTA bilaterally; Normal effort  HEART: S1S2 Normal intensity, no murmurs, gallops or rubs noted  ABDOMEN: Soft, BS Normoactive, NT, ND, no HSM noted  EXTREMITIES:  2+ radial and DP pulses noted, no clubbing, cyanosis, or edema noted, FROM x 4  SKIN: No rashes or lesions noted  NEURO: A&Ox3, no focal deficits noted, CN II-XII intact  PSYCH: normal mood and affect; insight/judgement appropriate  LABS:                        10.2   6.24  )-----------( 161      ( 26 Mar 2020 10:10 )             32.0     03-26    142  |  107  |  26.0<H>  ----------------------------<  127<H>  4.1   |  22.0  |  1.14    Ca    8.2<L>      26 Mar 2020 10:10  Phos  3.2     03-26  Mg     2.4     03-26    TPro  6.5<L>  /  Alb  2.5<L>  /  TBili  1.3  /  DBili  x   /  AST  99<H>  /  ALT  64<H>  /  AlkPhos  67  03-26        RADIOLOGY & ADDITIONAL TESTS:    MEDICATIONS:  MEDICATIONS  (STANDING):  chlorhexidine 0.12% Liquid 15 milliLiter(s) Oral Mucosa two times a day  chlorhexidine 4% Liquid 1 Application(s) Topical <User Schedule>  enoxaparin Injectable 40 milliGRAM(s) SubCutaneous daily  folic acid Injectable 1 milliGRAM(s) IV Push daily  metoprolol tartrate 25 milliGRAM(s) Oral every 8 hours  piperacillin/tazobactam IVPB.. 3.375 Gram(s) IV Intermittent every 8 hours  sucralfate suspension 1 Gram(s) Oral every 6 hours  thiamine IVPB 200 milliGRAM(s) IV Intermittent daily  valproate sodium IVPB 500 milliGRAM(s) IV Intermittent every 8 hours    MEDICATIONS  (PRN):  acetaminophen    Suspension .. 650 milliGRAM(s) Oral every 6 hours PRN Temp greater or equal to 38.5C (101.3F)  labetalol Injectable 10 milliGRAM(s) IV Push every 4 hours PRN Systolic blood pressure > 160  LORazepam   Injectable 2 milliGRAM(s) IV Push every 6 hours PRN Agitation  morphine  - Injectable 2 milliGRAM(s) IV Push every 4 hours PRN Severe Pain (7 - 10) CC: Covid 19 associated viral pneumonia with acute hypoxemic resp failure that is resolved. Now off of vent.  Seizure now with encephalopathy characterized by confusion and agitations.  Wife said no history of seizure and was a  until 2 weeks ago when he began having fevers. Left shoulder fracture, unsure if secondary to a fall.  Family denied witnessed fall.   HPI:  Pt is a 70 YOM h/o HTN who presented to ED yesterday with dizziness and was discharged.  Pt represents to ED today with AMS, dizziness. Pt is poor historian.  He had a witnessed seizure in ER and received Ativan 4mg IVP.  Upon my arrival in ED pt had bitten his tongue and aspirated on blood.  He was hypoxic and required emergent intubation/see intubation note.  Pt unable to offer any further information.  No family available at this time for more history. (20 Mar 2020 21:38)    REVIEW OF SYSTEMS:    Patient denied fever, chills, abdominal pain, nausea, vomiting, cough, shortness of breath, chest pain or palpitations    Vital Signs Last 24 Hrs  T(C): 38.2 (26 Mar 2020 09:52), Max: 38.4 (26 Mar 2020 00:17)  T(F): 100.8 (26 Mar 2020 09:52), Max: 101.2 (26 Mar 2020 00:17)  HR: 101 (26 Mar 2020 09:52) (94 - 121)  BP: 158/70 (26 Mar 2020 09:52) (153/68 - 185/87)  BP(mean): 105 (25 Mar 2020 21:00) (105 - 145)  RR: 19 (26 Mar 2020 09:52) (16 - 33)  SpO2: 98% (26 Mar 2020 09:52) (90% - 100%)I&O's Summary    25 Mar 2020 07:01  -  26 Mar 2020 07:00  --------------------------------------------------------  IN: 810.9 mL / OUT: 960 mL / NET: -149.1 mL    26 Mar 2020 07:01  -  26 Mar 2020 13:21  --------------------------------------------------------  IN: 310 mL / OUT: 0 mL / NET: 310 mL      PHYSICAL EXAM:  GENERAL: NAD,   HEENT: PERRL, +EOMI, anicteric, no [place]  NECK: Supple, No JVD   CHEST/LUNG: CTA bilaterally; Normal effort  HEART: S1S2 Normal intensity, no murmurs, gallops or rubs noted  ABDOMEN: Soft, BS Normoactive, NT, ND, no HSM noted  EXTREMITIES:  Left upper ext edema, shoulder motion tenderness. Blisters left dorsum of hand.  DP pulses noted, no clubbing, cyanosis, or edema noted, Limited mobility   SKIN: No rashes or lesions noted  NEURO: Confusion agitations , no focal deficits noted, CN II-XII intact  PSYCH: Delirium depressed mood and affect; insight/judgement inappropriate  LABS:                        10.2   6.24  )-----------( 161      ( 26 Mar 2020 10:10 )             32.0     03-26    142  |  107  |  26.0<H>  ----------------------------<  127<H>  4.1   |  22.0  |  1.14    Ca    8.2<L>      26 Mar 2020 10:10  Phos  3.2     03-26  Mg     2.4     03-26    TPro  6.5<L>  /  Alb  2.5<L>  /  TBili  1.3  /  DBili  x   /  AST  99<H>  /  ALT  64<H>  /  AlkPhos  67  03-26        RADIOLOGY & ADDITIONAL TESTS:    MEDICATIONS:  MEDICATIONS  (STANDING):  chlorhexidine 0.12% Liquid 15 milliLiter(s) Oral Mucosa two times a day  chlorhexidine 4% Liquid 1 Application(s) Topical <User Schedule>  enoxaparin Injectable 40 milliGRAM(s) SubCutaneous daily  folic acid Injectable 1 milliGRAM(s) IV Push daily  metoprolol tartrate 25 milliGRAM(s) Oral every 8 hours  piperacillin/tazobactam IVPB.. 3.375 Gram(s) IV Intermittent every 8 hours  sucralfate suspension 1 Gram(s) Oral every 6 hours  thiamine IVPB 200 milliGRAM(s) IV Intermittent daily  valproate sodium IVPB 500 milliGRAM(s) IV Intermittent every 8 hours    MEDICATIONS  (PRN):  acetaminophen    Suspension .. 650 milliGRAM(s) Oral every 6 hours PRN Temp greater or equal to 38.5C (101.3F)  labetalol Injectable 10 milliGRAM(s) IV Push every 4 hours PRN Systolic blood pressure > 160  LORazepam   Injectable 2 milliGRAM(s) IV Push every 6 hours PRN Agitation  morphine  - Injectable 2 milliGRAM(s) IV Push every 4 hours PRN Severe Pain (7 - 10)

## 2020-03-26 NOTE — PROGRESS NOTE ADULT - SUBJECTIVE AND OBJECTIVE BOX
[facility]  INFECTIOUS DISEASES AND INTERNAL MEDICINE at [place]  =======================================================  Collins Casarez MD  Diplomates American Board of Internal Medicine and Infectious Diseases  Tel: [phone]      Fax: [phone]  =======================================================    SAGE CAMARA [id number]    Follow up: COVID 19    s/p extubation  s/p LP 3/23/20    Patient wakes to stimuli but no meaningful communication     + fevers to 101.2 today    Allergies:  No Known Allergies      Antibiotics:  piperacillin/tazobactam IVPB.. 3.375 Gram(s) IV Intermittent every 8 hours       REVIEW OF SYSTEMS:  Unable to obtain due to medical condition       Physical Exam:  ICU Vital Signs Last 24 Hrs  T(C): 38.2 (26 Mar 2020 09:52), Max: 38.4 (26 Mar 2020 00:17)  T(F): 100.8 (26 Mar 2020 09:52), Max: 101.2 (26 Mar 2020 00:17)  HR: 101 (26 Mar 2020 09:52) (93 - 121)  BP: 158/70 (26 Mar 2020 09:52) (153/68 - 185/122)  BP(mean): 105 (25 Mar 2020 21:00) (102 - 145)  RR: 19 (26 Mar 2020 09:52) (16 - 36)  SpO2: 98% (26 Mar 2020 09:52) (90% - 100%)        GEN: Awake, NAD  HEENT: normocephalic and atraumatic. EOMI. PERRL.  Anicteric  NECK: Supple.   LUNGS: Course BS B/L  HEART: Regular rate and rhythm   ABDOMEN: Soft, nontender, and nondistended.  Positive bowel sounds.    : No CVA tenderness  EXTREMITIES: Without any edema.  MSK: No joint swelling  NEUROLOGIC: Awake to stimuli, minimal communication   PSYCHIATRIC: unable to access   SKIN: No Rash      Labs:  03-26    142  |  107  |  26.0<H>  ----------------------------<  127<H>  4.1   |  22.0  |  1.14    Ca    8.2<L>      26 Mar 2020 10:10  Phos  3.2     03-26  Mg     2.4     03-26    TPro  6.5<L>  /  Alb  2.5<L>  /  TBili  1.3  /  DBili  x   /  AST  99<H>  /  ALT  64<H>  /  AlkPhos  67  03-26                          10.2   6.24  )-----------( 161      ( 26 Mar 2020 10:10 )             32.0       LIVER FUNCTIONS - ( 26 Mar 2020 10:10 )  Alb: 2.5 g/dL / Pro: 6.5 g/dL / ALK PHOS: 67 U/L / ALT: 64 U/L / AST: 99 U/L / GGT: x             Protein, CSF (03.23.20 @ 11:46)    Protein, CSF: 143 mg/dL      Glucose, CSF (03.23.20 @ 11:46)    Glucose, CSF: 77 mg/dLG/24h      Cerebrospinal Fluid Cell Count-1 (03.23.20 @ 11:46)    Total Nucleated Cell Count, CSF: 5 /uL    CSF Color: No Color    CSF Appearance: Clear    CSF Segmented Neutrophils: N/A %      Procalcitonin, Serum: 2.45 ng/mL (03-21-20 @ 04:29)    C-Reactive Protein, Serum: 11.41 mg/dL (03-24-20 @ 09:21)      RECENT CULTURES:  03-23 @ 16:13 .CSF     No growth at 3 days.  No polymorphonuclear cells seen  No organisms seen by cytocentrifuge      03-22 @ 16:32 .Sputum     Normal Respiratory Danay present  Few polymorphonuclear leukocytes per low power field  Rare Squamous epithelial cells per low power field  No organisms seen per oil power field      03-21 @ 17:22 .Blood Coag Negative Staphylococcus  Blood Culture PCR    Growth in anaerobic bottle: Coag Negative Staphylococcus  Anaerobic Bottle: 14:46 Hours to positivity  Aerobic Bottle: No growth to date  ***Blood Panel PCR results on this specimen are available  approximately 3 hours after the Gram stain result.***  Gram stain, PCR, and/or culture results may not always  correspond due to difference in methodologies.  ************************************************************  This PCR assay was performed using [employer].  The following targets are tested for: Enterococcus,  vancomycin resistant enterococci, Listeria monocytogenes,  coagulase negative staphylococci, S. aureus,  methicillin resistant S. aureus, Streptococcus agalactiae  (Group B), S. pneumoniae, S. pyogenes (Group A),  Acinetobacter baumannii, Enterobacter cloacae, E. coli,  Klebsiella oxytoca, K. pneumoniae, Proteus sp.,  Serratia marcescens, Haemophilus influenzae,  Neisseria meningitidis, Pseudomonas aeruginosa, Candida  albicans, C. glabrata, C krusei, C parapsilosis,  C.tropicalis and the KPC resistance gene.      03-21 @ 07:12 .Blood     No growth at 5 days.      03-21 @ 01:24    [facility]      COVID-19 PCR (03.21.20 @ 14:21)    COVID-19 PCR: Detected: LDT - Laboratory Developed Test All “detected” results on this new test  are considered presumptively positive results, are clinically actionable,  and specimens will be forwarded to [facility] for confirmation testing.  Another report (corrected report) will only be issued if discordant  results occur.  This test has been validated by [employer] to be accurate;  though it has not been FDA cleared/approved by the usual pathway.  As with all laboratory tests, results should be correlated with clinical  findings.          < from: Xray Chest 1 View- PORTABLE-Urgent (03.24.20 @ 02:06) >  EXAM:  XR CHEST PORTABLE URGENT 1V                          PROCEDURE DATE:  03/24/2020      INTERPRETATION:  Clinical information: NG tube placement.    Technique: Frontal view of the chest.    Comparison: Prior chest x-ray examination from 3/21/2020.    Findings: The previously noted endotracheal tube is not well evaluated. There is an NG Tube with its tip projecting below the diaphragm.    There has been interval worsening of right-sided interstitial opacities. A few vague left-sided interstitial opacities are noted. The heart size is normal. The visualized osseous structures are unremarkable.    IMPRESSION: Worsening of right-sided airspace disease. Unchanged vague left-sided interstitial opacities    < end of copied text >

## 2020-03-26 NOTE — PROGRESS NOTE ADULT - ASSESSMENT
70 yr male  with fever seizure confusion and encephalopathy with positive COVID-19.  Acute resp failure for COVID-19 resolved , extubed and tolerating oxygen via nasal cannula       Problem/Plan - 1:  ·  Problem: Pneumonia, viral.  Plan: Present on admission   CXR showing right lung opacity.   Likely secondary to COVID-19   Zithromax 500mg iv qd.      Problem/Plan - 2:  ·  Problem: Acute respiratory failure with hypoxia.  Plan: Now extubated and tolerating oxygen via NC  Continuous pulse ox monitoring.      Problem/Plan - 3:  ·  Problem: Seizure.  Plan: Unclear etiology.    May be secondary to Covid cerebritis or encephalitis   CSF studies is non specific   Vancyclovir is discontinued   Depakote 500mg iv q8  Neurology is following   EKG show no epileptiform pattern   Eventual brain MRI seizure protocol when agitations resolve or is controlle d.      Problem/Plan - 4:  ·  Problem: Essential hypertension.  Plan: Labetalol IV with parameters.      Problem/Plan - 5:  ·  Problem: Encephalopathy acute.  Plan: Ativan  Thiamine   Depakote.   Supportive care.      Problem/Plan - 6:  Problem: Acute pain of left shoulder. Plan: With motion tenderness and edema of entire left upper ext  To obtain left shoulder xray, CT  And recall ortho.      Electronic Signatures:  Robin Vance ([facility] [place]  (Signed 25-Mar 70 yr male  with fever seizure confusion and encephalopathy with positive COVID-19.  Acute resp failure for COVID-19 resolved , extubed and tolerating oxygen via nasal cannula       Problem/Plan - 1:  ·  Problem: Pneumonia, viral.  Plan: Present on admission   CXR showing bilateral opacity.   Likely secondary to COVID-19   Zithromax 500mg iv qd.      Problem/Plan - 2:  ·  Problem: Acute respiratory failure with hypoxia.  Plan: Now extubated and tolerating oxygen via NC     Problem/Plan - 3:  ·  Problem: Seizure.  Plan: Unclear etiology.    May be secondary to Covid cerebritis or encephalitis   CSF studies is non specific   Vancyclovir is discontinued   Depakote 500mg iv q8  Neurology is following   EKG show no epileptiform pattern   Eventual brain MRI seizure protocol when agitations resolve or is controlle       Problem/Plan - 4:  ·  Problem: Essential hypertension.  Plan: Labetalol IV with parameters.      Problem/Plan - 5:  ·  Problem: Encephalopathy acute.  Plan: Ativan. Haldol  Thiamine   Depakote.   Supportive care.      Problem/Plan - 6:  Problem: Acute pain of left shoulder. Plan: With motion tenderness and edema of entire left upper ext  Ortho is reviewing for further determinations.

## 2020-03-26 NOTE — PROGRESS NOTE ADULT - ASSESSMENT
69y/o  Male with h/o HTN. Patient presented to ED 3/20 with dizziness and was discharged after evaluation in the ER. He returned to the ER 3/20 with AMS, and had a seizure episode in the ER. Patient was given ativan and during the seizure had a bite to this tongue and aspirated blood.  He was hypoxic and required emergent intubation. Patient was febrile in the ER no leukocytosis. He was admitted to MICU, COVID PCR was sent and is positive. Patient also started on Acyclovir for ? encephalitis given seizure episode.       COVID 19  encephalopathy  Seizure episode  AMS  Positive blood culture  ODESSA      - Blood cultures with CoNS in 1 of 4 bottles, likely contamination  - COVID 19 +  - RVP negative  - CXR with rt sided pneumonia  - UA with no UTI  - Procalcitonin level 2.45  - Continue Zosyn till 3/27/20  - CSF HSV PCR negative  - CSF PCR negative  - Called lab, unable to do COVID CSF PCR  - Trend Fever  - Trend Leukocytosis      Will Follow

## 2020-03-26 NOTE — CONSULT NOTE ADULT - SUBJECTIVE AND OBJECTIVE BOX
Pt Name: SAGE CAMARA    MRN: [id number]      Patient is a 70y Male whom presented to the emergency department on March 20,2020 with AMS, respiratory failure, seizure, aspiration pna   Patient was intubated and has been under the care of medical and ICU teams. Ortho team called on 3/25/20 by ICU team regarding ultrasound results stating possible fluid medial left shoulder.   According to chart: 1st notation of left upper extremity issue was on 3/25/20 by vascular team. It is unclear as to the exact complaint or when the patient began presenting with LUE swelling or pain.           HEALTH ISSUES - PROBLEM Dx:  Acute pain of left shoulder: Acute pain of left shoulder  Encephalopathy acute: Encephalopathy acute  Essential hypertension: Essential hypertension  Pneumonia, viral: Pneumonia, viral/20  Metabolic encephalopathy: Metabolic encephalopathy  Hypertension, unspecified type: Hypertension, unspecified type  Aspiration pneumonia of both lower lobes, unspecified aspiration pneumonia type: Aspiration pneumonia of both lower lobes, unspecified aspiration pneumonia type  Acute respiratory failure with hypoxia: Acute respiratory failure with hypoxia  Seizure: Seizure      .      REVIEW OF SYSTEMS      General: Patient does not answer questions. Patient unable to state his name 	    Musculoskeletal:	 see HPI       PAST MEDICAL & SURGICAL HISTORY:  HTN (hypertension)  No significant past surgical history      Allergies: No Known Allergies      Medications: acetaminophen    Suspension .. 650 milliGRAM(s) Oral every 6 hours PRN  chlorhexidine 0.12% Liquid 15 milliLiter(s) Oral Mucosa two times a day  chlorhexidine 4% Liquid 1 Application(s) Topical <User Schedule>  enoxaparin Injectable 40 milliGRAM(s) SubCutaneous daily  folic acid Injectable 1 milliGRAM(s) IV Push daily  labetalol Injectable 10 milliGRAM(s) IV Push every 4 hours PRN  LORazepam   Injectable 2 milliGRAM(s) IV Push every 6 hours PRN  metoprolol tartrate 25 milliGRAM(s) Oral every 8 hours  morphine  - Injectable 2 milliGRAM(s) IV Push every 4 hours PRN  OLANZapine Disintegrating Tablet 5 milliGRAM(s) Oral two times a day  piperacillin/tazobactam IVPB.. 3.375 Gram(s) IV Intermittent every 8 hours  sucralfate suspension 1 Gram(s) Oral every 6 hours  thiamine IVPB 200 milliGRAM(s) IV Intermittent daily  valproate sodium IVPB 500 milliGRAM(s) IV Intermittent every 8 hours    MEDICATIONS  (PRN):  acetaminophen    Suspension .. 650 milliGRAM(s) Oral every 6 hours PRN Temp greater or equal to 38.5C (101.3F)  labetalol Injectable 10 milliGRAM(s) IV Push every 4 hours PRN Systolic blood pressure > 160  LORazepam   Injectable 2 milliGRAM(s) IV Push every 6 hours PRN Agitation  morphine  - Injectable 2 milliGRAM(s) IV Push every 4 hours PRN Severe Pain (7 - 10)  FAMILY HISTORY:  : non-contributory    Social History:     Ambulation: as per chart and nursing staff the patient was fully active member of society and is employed as a                            10.2   6.24  )-----------( 161      ( 26 Mar 2020 10:10 )             32.0     03-26    142  |  107  |  26.0<H>  ----------------------------<  127<H>  4.1   |  22.0  |  1.14    Ca    8.2<L>      26 Mar 2020 10:10  Phos  3.2     03-26  Mg     2.4     03-26    TPro  6.5<L>  /  Alb  2.5<L>  /  TBili  1.3  /  DBili  x   /  AST  99<H>  /  ALT  64<H>  /  AlkPhos  67  03-26      PHYSICAL EXAM:    Vital Signs Last 24 Hrs  T(C): 37.7 (26 Mar 2020 14:45), Max: 38.4 (26 Mar 2020 00:17)  T(F): 99.8 (26 Mar 2020 14:45), Max: 101.2 (26 Mar 2020 00:17)  HR: 97 (26 Mar 2020 14:45) (94 - 103)  BP: 144/64 (26 Mar 2020 14:45) (144/64 - 172/129)  BP(mean): 105 (25 Mar 2020 21:00) (105 - 145)  RR: 17 (26 Mar 2020 14:45) (16 - 27)  SpO2: 97% (26 Mar 2020 14:45) (97% - 100%)  Daily     Daily     Appearance: Patient appears agitated and cannot seem to lay still in bed. Patient does Not follow commands. He cannot tell me his name nor does he provide any eye contact.    Musculoskeletal:  Left Upper Extremity: Ace wrap at wrist and extending proximally. I removed the ace wrap and ABD pads from arm. Distal on dorsum of hand, and wrist there are large blisters and bullae. Some have erupted with no active drainage or discharge noted, Diffuse moderate to severe edema of the entire left UE. Left hand has severe edema noted. Patient winces in pain and uses his right hand to push me away with attempts to perform PROM of the left elbow, wrist and shoulder. Patient mumbles and rolls around appearing to be in pain while placing sling.          Imaging Studies:      EXAM:  CT CHEST                          PROCEDURE DATE:  03/20/2020          INTERPRETATION:  CLINICAL INFORMATION: Cough and shortness of breath.    COMPARISON: Chest radiograph 3/20/2020.    PROCEDURE:   CT of the Chest was performed without intravenous contrast.  Sagittal and coronal reformats were performed.      FINDINGS:  Respiratory motion and streak artifact degrades image quality, limiting evaluation.    LUNGS AND AIRWAYS:   PLEURA: There are bilateral lower lobe airspace consolidations.  There are trace bilateral pleural effusions.    The central airways remain patent.    MEDIASTINUM AND MIGUEL: No lymphadenopathy.    VESSELS: Atherosclerotic calcification of the aorta.    HEART: Heart size is normal. No pericardial effusion.    CHEST WALL AND LOWER NECK: Within normal limits.    VISUALIZED UPPER ABDOMEN:   Partially imaged is a cystic lesion left upper quadrant abdomen, which could reflect exophytic renal cyst.    BONES: Age indeterminate compression deformity T10 vertebral body.  Partially imaged fracture the proximal left humerus.    IMPRESSION:     Limited, motion study.    Bibasilar airspace consolidations, which may reflect pneumonia in the appropriate clinical setting.    Trace pleural effusions.    Fracture proximal left humerus, recommend clinical correlation and dedicated left shoulder/humerus radiographs.      KEVIN GILLILAND M.D., ATTENDING RADIOLOGIST  This document has been electronically signed. Mar 20 2020 11:15AM               EXAM:  HUMERUS-LEFT                          PROCEDURE DATE:  03/25/2020          INTERPRETATION:  CLINICAL INFORMATION: LUE deformity. LUE deformity. ADMDIAG1: R56.9 WEAKNESS/.    EXAM:  2 views of the left humerus.    COMPARISON: Left chest radiograph 3/24/2020.    FINDINGS:    No acute fracture or dislocation.      IMPRESSION:  No acute fracture or dislocation.         MARIO PRINCE   This document has been electronically signed. Mar 25 2020  1:32PM               EXAM:  FOREARM-ULNA & RADIUS-LEFT                          PROCEDURE DATE:  03/25/2020          INTERPRETATION:  CLINICAL INFORMATION: LUE deformity. LUE deformity. ADMDIAG1: R56.9 WEAKNESS/.    EXAM:  2 views of the left forearm.    COMPARISON: None.    FINDINGS:    No acute fracture or dislocation.    Joint spaces are preserved.    Rounded soft tissue tissue protuberances along the hand and forearm.    IMPRESSION:  No acute fracture or dislocation.        MARIO PRINCE   This document has been electronically signed. Mar 25 2020  1:34PM               EXAM:  SHOULDER COMP  MIN 2 VIEWS-LT                          PROCEDURE DATE:  03/26/2020          INTERPRETATION:  CLINICAL INFORMATION: pains limitation of movement. seizure pain likely trauma. ADMDIAG1: R56.9 WEAKNESS/.    EXAM:  3 views of the left shoulder.    COMPARISON: Radiographs of the left humerus 3/25/2020.    FINDINGS:    Left posterior shoulder dislocation. Contour irregularity of the medial humeral head/neck suggesting impaction fracture.    IMPRESSION:  Left posterior shoulder dislocation. Contour irregularity of the medial humeral head/neck suggesting impaction fracture.        MARIO PRINCE   This document has been electronically signed. Mar 26 2020  2:05PM              A/P:  Pt is a  70y Male with multiple medical issues including COVID positive, pneumonia, AMS, Encephalopathy, seizures and HTN found to have   Minimally displaced Left proximal humerus fracture.      PLAN:   * Case discussed with Dr Barr   * Sling to left UE  * NWB Left UE   * Non operative treatment and management of fracture   * recommend ROM of left hand and fingers to aid in decreasing edema and swelling  * recommend non circumferential dressing to ruptured bullae   * Obtain CT of left shoulder once patient is more alert and oriented   * patient to follow up with Dr Barr as out patient in 3 weeks

## 2020-03-26 NOTE — PROCEDURE NOTE - NSSITEPREP_SKIN_A_CORE
chlorhexidine/Adherence to aseptic technique: hand hygiene prior to donning barriers (gown, gloves), don cap and mask, sterile drape over patient
povidone iodine (if allergic to chlorhexidine)/chlorhexidine

## 2020-03-27 LAB
ALBUMIN SERPL ELPH-MCNC: 2.4 G/DL — LOW (ref 3.3–5.2)
ALP SERPL-CCNC: 68 U/L — SIGNIFICANT CHANGE UP (ref 40–120)
ALT FLD-CCNC: 56 U/L — HIGH
ANION GAP SERPL CALC-SCNC: 13 MMOL/L — SIGNIFICANT CHANGE UP (ref 5–17)
AST SERPL-CCNC: 75 U/L — HIGH
BASOPHILS # BLD AUTO: 0.02 K/UL — SIGNIFICANT CHANGE UP (ref 0–0.2)
BASOPHILS NFR BLD AUTO: 0.3 % — SIGNIFICANT CHANGE UP (ref 0–2)
BILIRUB SERPL-MCNC: 1 MG/DL — SIGNIFICANT CHANGE UP (ref 0.4–2)
BUN SERPL-MCNC: 28 MG/DL — HIGH (ref 8–20)
CALCIUM SERPL-MCNC: 8 MG/DL — LOW (ref 8.6–10.2)
CHLORIDE SERPL-SCNC: 110 MMOL/L — HIGH (ref 98–107)
CO2 SERPL-SCNC: 23 MMOL/L — SIGNIFICANT CHANGE UP (ref 22–29)
CREAT SERPL-MCNC: 1.31 MG/DL — HIGH (ref 0.5–1.3)
CULTURE RESULTS: SIGNIFICANT CHANGE UP
EOSINOPHIL # BLD AUTO: 0.04 K/UL — SIGNIFICANT CHANGE UP (ref 0–0.5)
EOSINOPHIL NFR BLD AUTO: 0.5 % — SIGNIFICANT CHANGE UP (ref 0–6)
GLUCOSE SERPL-MCNC: 126 MG/DL — HIGH (ref 70–99)
HCT VFR BLD CALC: 32.9 % — LOW (ref 39–50)
HGB BLD-MCNC: 10.5 G/DL — LOW (ref 13–17)
IMM GRANULOCYTES NFR BLD AUTO: 2 % — HIGH (ref 0–1.5)
LYMPHOCYTES # BLD AUTO: 1.07 K/UL — SIGNIFICANT CHANGE UP (ref 1–3.3)
LYMPHOCYTES # BLD AUTO: 14.5 % — SIGNIFICANT CHANGE UP (ref 13–44)
MAGNESIUM SERPL-MCNC: 2.4 MG/DL — SIGNIFICANT CHANGE UP (ref 1.6–2.6)
MCHC RBC-ENTMCNC: 27.5 PG — SIGNIFICANT CHANGE UP (ref 27–34)
MCHC RBC-ENTMCNC: 31.9 GM/DL — LOW (ref 32–36)
MCV RBC AUTO: 86.1 FL — SIGNIFICANT CHANGE UP (ref 80–100)
MONOCYTES # BLD AUTO: 0.99 K/UL — HIGH (ref 0–0.9)
MONOCYTES NFR BLD AUTO: 13.4 % — SIGNIFICANT CHANGE UP (ref 2–14)
NEUTROPHILS # BLD AUTO: 5.11 K/UL — SIGNIFICANT CHANGE UP (ref 1.8–7.4)
NEUTROPHILS NFR BLD AUTO: 69.3 % — SIGNIFICANT CHANGE UP (ref 43–77)
ORGANISM # SPEC MICROSCOPIC CNT: SIGNIFICANT CHANGE UP
PHOSPHATE SERPL-MCNC: 4.2 MG/DL — SIGNIFICANT CHANGE UP (ref 2.4–4.7)
PLATELET # BLD AUTO: 197 K/UL — SIGNIFICANT CHANGE UP (ref 150–400)
POTASSIUM SERPL-MCNC: 4.1 MMOL/L — SIGNIFICANT CHANGE UP (ref 3.5–5.3)
POTASSIUM SERPL-SCNC: 4.1 MMOL/L — SIGNIFICANT CHANGE UP (ref 3.5–5.3)
PROT SERPL-MCNC: 6.5 G/DL — LOW (ref 6.6–8.7)
RBC # BLD: 3.82 M/UL — LOW (ref 4.2–5.8)
RBC # FLD: 15.2 % — HIGH (ref 10.3–14.5)
SODIUM SERPL-SCNC: 146 MMOL/L — HIGH (ref 135–145)
SPECIMEN SOURCE: SIGNIFICANT CHANGE UP
WBC # BLD: 7.38 K/UL — SIGNIFICANT CHANGE UP (ref 3.8–10.5)
WBC # FLD AUTO: 7.38 K/UL — SIGNIFICANT CHANGE UP (ref 3.8–10.5)

## 2020-03-27 PROCEDURE — 99232 SBSQ HOSP IP/OBS MODERATE 35: CPT

## 2020-03-27 PROCEDURE — 99233 SBSQ HOSP IP/OBS HIGH 50: CPT

## 2020-03-27 RX ORDER — METOPROLOL TARTRATE 50 MG
50 TABLET ORAL
Refills: 0 | Status: DISCONTINUED | OUTPATIENT
Start: 2020-03-27 | End: 2020-04-07

## 2020-03-27 RX ADMIN — Medication 50 MILLIGRAM(S): at 17:54

## 2020-03-27 RX ADMIN — Medication 500 MILLIGRAM(S): at 13:17

## 2020-03-27 RX ADMIN — Medication 1 TABLET(S): at 13:17

## 2020-03-27 RX ADMIN — OLANZAPINE 5 MILLIGRAM(S): 15 TABLET, FILM COATED ORAL at 10:25

## 2020-03-27 RX ADMIN — Medication 27.5 MILLIGRAM(S): at 12:40

## 2020-03-27 RX ADMIN — Medication 27.5 MILLIGRAM(S): at 06:20

## 2020-03-27 RX ADMIN — Medication 650 MILLIGRAM(S): at 17:00

## 2020-03-27 RX ADMIN — Medication 25 MILLIGRAM(S): at 06:19

## 2020-03-27 RX ADMIN — Medication 650 MILLIGRAM(S): at 16:02

## 2020-03-27 RX ADMIN — CHLORHEXIDINE GLUCONATE 1 APPLICATION(S): 213 SOLUTION TOPICAL at 06:23

## 2020-03-27 RX ADMIN — PIPERACILLIN AND TAZOBACTAM 25 GRAM(S): 4; .5 INJECTION, POWDER, LYOPHILIZED, FOR SOLUTION INTRAVENOUS at 17:40

## 2020-03-27 RX ADMIN — ENOXAPARIN SODIUM 40 MILLIGRAM(S): 100 INJECTION SUBCUTANEOUS at 13:17

## 2020-03-27 RX ADMIN — CHLORHEXIDINE GLUCONATE 15 MILLILITER(S): 213 SOLUTION TOPICAL at 06:23

## 2020-03-27 RX ADMIN — Medication 27.5 MILLIGRAM(S): at 21:56

## 2020-03-27 RX ADMIN — Medication 10 MILLIGRAM(S): at 06:43

## 2020-03-27 RX ADMIN — Medication 1 MILLIGRAM(S): at 13:17

## 2020-03-27 RX ADMIN — PIPERACILLIN AND TAZOBACTAM 25 GRAM(S): 4; .5 INJECTION, POWDER, LYOPHILIZED, FOR SOLUTION INTRAVENOUS at 02:00

## 2020-03-27 RX ADMIN — OLANZAPINE 5 MILLIGRAM(S): 15 TABLET, FILM COATED ORAL at 18:19

## 2020-03-27 RX ADMIN — CHLORHEXIDINE GLUCONATE 15 MILLILITER(S): 213 SOLUTION TOPICAL at 17:55

## 2020-03-27 RX ADMIN — Medication 102 MILLIGRAM(S): at 14:30

## 2020-03-27 RX ADMIN — PIPERACILLIN AND TAZOBACTAM 25 GRAM(S): 4; .5 INJECTION, POWDER, LYOPHILIZED, FOR SOLUTION INTRAVENOUS at 10:25

## 2020-03-27 NOTE — CHART NOTE - NSCHARTNOTEFT_GEN_A_CORE
Source: Patient [ ]  Family [ ]   other [ x]    Current Diet: Diet, NPO with Tube Feed:   Starting Tube Feed Rate {mL per Hour}: 15  Increase Tube Feed Rate by (mL): 10     Every 6 hours  Until Goal Tube Feed Rate (mL per Hour): 55  Tube Feed Start Time: 22:00 (20 @ 20:48)    Current Weight:   (3/20) 220.4#  -Monitor wts. Generalized 1+ edema, b/l arm 2+ edema noted.     Pertinent Medications: MEDICATIONS  (STANDING):  ascorbic acid 500 milliGRAM(s) Oral daily  chlorhexidine 0.12% Liquid 15 milliLiter(s) Oral Mucosa two times a day  chlorhexidine 4% Liquid 1 Application(s) Topical <User Schedule>  enoxaparin Injectable 40 milliGRAM(s) SubCutaneous daily  folic acid 1 milliGRAM(s) Oral daily  metoprolol tartrate 25 milliGRAM(s) Oral every 8 hours  multivitamin 1 Tablet(s) Oral daily  OLANZapine Disintegrating Tablet 5 milliGRAM(s) Oral two times a day  piperacillin/tazobactam IVPB.. 3.375 Gram(s) IV Intermittent every 8 hours  thiamine IVPB 200 milliGRAM(s) IV Intermittent daily  valproate sodium IVPB 500 milliGRAM(s) IV Intermittent every 8 hours    MEDICATIONS  (PRN):  acetaminophen    Suspension .. 650 milliGRAM(s) Oral every 6 hours PRN Temp greater or equal to 38.5C (101.3F)  labetalol Injectable 10 milliGRAM(s) IV Push every 4 hours PRN Systolic blood pressure > 160  LORazepam   Injectable 2 milliGRAM(s) IV Push every 6 hours PRN Agitation  morphine  - Injectable 2 milliGRAM(s) IV Push every 4 hours PRN Severe Pain (7 - 10)    Pertinent Labs: CBC Full  -  ( 27 Mar 2020 08:07 )  WBC Count : 7.38 K/uL  RBC Count : 3.82 M/uL  Hemoglobin : 10.5 g/dL  Hematocrit : 32.9 %  Platelet Count - Automated : 197 K/uL  Mean Cell Volume : 86.1 fl  Mean Cell Hemoglobin : 27.5 pg  Mean Cell Hemoglobin Concentration : 31.9 gm/dL  Auto Neutrophil # : 5.11 K/uL  Auto Lymphocyte # : 1.07 K/uL  Auto Monocyte # : 0.99 K/uL  Auto Eosinophil # : 0.04 K/uL  Auto Basophil # : 0.02 K/uL  Auto Neutrophil % : 69.3 %  Auto Lymphocyte % : 14.5 %  Auto Monocyte % : 13.4 %  Auto Eosinophil % : 0.5 %  Auto Basophil % : 0.3 %  03-27 Na146 mmol/L<H> Glu 126 mg/dL<H> K+ 4.1 mmol/L Cr  1.31 mg/dL<H> BUN 28.0 mg/dL<H> Phos 4.2 mg/dL Alb 2.4 g/dL<L> PAB n/a       Skin: Lumbar puncture site, L anterior upper hand blister, L medial wrist blister     Nutrition focused physical exam not conducted - found signs of malnutrition [ ]absent [ ]present    Subcutaneous fat loss: [ ] Orbital fat pads region, [ ]Buccal fat region, [ ]Triceps region,  [ ]Ribs region    Muscle wasting: [ ]Temples region, [ ]Clavicle region, [ ]Shoulder region, [ ]Scapula region, [ ]Interosseous region,  [ ]thigh region, [ ]Calf region    Estimated Needs:   [] no change since previous assessment  [x ] recalculated:     IBW: 190#, 86kg  25-30kcal/k-2580kcal  1-1.2g/k-103g protein    Current Nutrition Diagnosis: Pt remains at high nutrition risk secondary to increased nutrient needs related to increased physiological demand for nutrient as evidenced by acute hypoxic respiratory failure (COVID 19 positive) and pneumonia likely secondary to covid 19. Pt now extubated, tolerating oxygen via NC. Fecal incontinence noted per documentation. No BM documented.     Recommendations:   Continuos RX: Jevity 1.5 initiated at 20 ml/hr and advance 10 ml/hr q4 hrs until goal rate of 70 ml/hr (x20 hrs) to provide 1400 ml, 2100 kcal, 89g protein, 1064 ml free water. Additional free water per MD discretion.  Bolus RX: Jevity 1.5 240cc x 6 daily bolus (7am, 10am, 1pm, 4pm, 7pm, 10pm) to provide 1440ml, 2130kcal, 91g pro, 1094ml free water. Pt will need additional 1036ml free water to adequately meet hydration needs. Consider 3oz flush before and after bolus administration (providing 1080ml additional fluid).     1) Continue MVI, Vitamin C (500mg), Thiamine, Folic Acid daily  2) Monitor tolerance to EN  3) Monitor wts and labs  4) Consider bowel regimen     Monitoring and Evaluation:   [ ] PO intake [x ] Tolerance to diet prescription [X] Weights  [X] Follow up per protocol [X] Labs: Source: Patient [ ]  Family [ ]   other [ x]    Current Diet: Diet, NPO with Tube Feed:   Starting Tube Feed Rate {mL per Hour}: 15  Increase Tube Feed Rate by (mL): 10     Every 6 hours  Until Goal Tube Feed Rate (mL per Hour): 55  Tube Feed Start Time: 22:00 (20 @ 20:48)    Current Weight:   (3/20) 220.4#  -Monitor wts. Generalized 1+ edema, b/l arm 2+ edema noted.     Pertinent Medications: MEDICATIONS  (STANDING):  ascorbic acid 500 milliGRAM(s) Oral daily  chlorhexidine 0.12% Liquid 15 milliLiter(s) Oral Mucosa two times a day  chlorhexidine 4% Liquid 1 Application(s) Topical <User Schedule>  enoxaparin Injectable 40 milliGRAM(s) SubCutaneous daily  folic acid 1 milliGRAM(s) Oral daily  metoprolol tartrate 25 milliGRAM(s) Oral every 8 hours  multivitamin 1 Tablet(s) Oral daily  OLANZapine Disintegrating Tablet 5 milliGRAM(s) Oral two times a day  piperacillin/tazobactam IVPB.. 3.375 Gram(s) IV Intermittent every 8 hours  thiamine IVPB 200 milliGRAM(s) IV Intermittent daily  valproate sodium IVPB 500 milliGRAM(s) IV Intermittent every 8 hours    MEDICATIONS  (PRN):  acetaminophen    Suspension .. 650 milliGRAM(s) Oral every 6 hours PRN Temp greater or equal to 38.5C (101.3F)  labetalol Injectable 10 milliGRAM(s) IV Push every 4 hours PRN Systolic blood pressure > 160  LORazepam   Injectable 2 milliGRAM(s) IV Push every 6 hours PRN Agitation  morphine  - Injectable 2 milliGRAM(s) IV Push every 4 hours PRN Severe Pain (7 - 10)    Pertinent Labs: CBC Full  -  ( 27 Mar 2020 08:07 )  WBC Count : 7.38 K/uL  RBC Count : 3.82 M/uL  Hemoglobin : 10.5 g/dL  Hematocrit : 32.9 %  Platelet Count - Automated : 197 K/uL  Mean Cell Volume : 86.1 fl  Mean Cell Hemoglobin : 27.5 pg  Mean Cell Hemoglobin Concentration : 31.9 gm/dL  Auto Neutrophil # : 5.11 K/uL  Auto Lymphocyte # : 1.07 K/uL  Auto Monocyte # : 0.99 K/uL  Auto Eosinophil # : 0.04 K/uL  Auto Basophil # : 0.02 K/uL  Auto Neutrophil % : 69.3 %  Auto Lymphocyte % : 14.5 %  Auto Monocyte % : 13.4 %  Auto Eosinophil % : 0.5 %  Auto Basophil % : 0.3 %  03-27 Na146 mmol/L<H> Glu 126 mg/dL<H> K+ 4.1 mmol/L Cr  1.31 mg/dL<H> BUN 28.0 mg/dL<H> Phos 4.2 mg/dL Alb 2.4 g/dL<L> PAB n/a       Skin: Lumbar puncture site, L anterior upper hand blister, L medial wrist blister     Nutrition focused physical exam not conducted - found signs of malnutrition [ ]absent [ ]present    Subcutaneous fat loss: [ ] Orbital fat pads region, [ ]Buccal fat region, [ ]Triceps region,  [ ]Ribs region    Muscle wasting: [ ]Temples region, [ ]Clavicle region, [ ]Shoulder region, [ ]Scapula region, [ ]Interosseous region,  [ ]thigh region, [ ]Calf region    Estimated Needs:   [] no change since previous assessment  [x ] recalculated:     IBW: 190#, 86kg  25-30kcal/k-2580kcal  1-1.2g/k-103g protein    Current Nutrition Diagnosis: Pt remains at high nutrition risk secondary to increased nutrient needs related to increased physiological demand for nutrient as evidenced by acute hypoxic respiratory failure (COVID 19 positive) and pneumonia likely secondary to covid 19. Pt now extubated, tolerating oxygen via NC. Fecal incontinence noted per documentation. No BM documented. NG tube placed per documentation.     Recommendations:   Continuous RX: Jevity 1.5 initiated at 20 ml/hr and advance 10 ml/hr q4 hrs until goal rate of 70 ml/hr (x20 hrs) to provide 1400 ml, 2100 kcal, 89g protein, 1064 ml free water. Additional free water per MD discretion.  Bolus RX: Jevity 1.5 240cc x 6 daily bolus (7am, 10am, 1pm, 4pm, 7pm, 10pm) to provide 1440ml, 2130kcal, 91g pro, 1094ml free water. Pt will need additional 1036ml free water to adequately meet hydration needs. Consider 3oz flush before and after bolus administration (providing 1080ml additional fluid).     1) Continue MVI, Vitamin C (500mg), Thiamine, Folic Acid daily  2) Monitor tolerance to EN  3) Monitor wts and labs  4) Consider bowel regimen     Monitoring and Evaluation:   [ ] PO intake [x ] Tolerance to diet prescription [X] Weights  [X] Follow up per protocol [X] Labs:

## 2020-03-27 NOTE — PROGRESS NOTE ADULT - SUBJECTIVE AND OBJECTIVE BOX
CC: COVID-19 pneumonitis.  Acute hypoxemic resp failure resolved.  Seizure .  Encephalopathy is resolving.  Started to communicate and interact somewhat. Left proximal humeral neck fracture minimally displaced.   HPI:  Pt is a 70 YOM h/o HTN who presented to ED yesterday with dizziness and was discharged.  Pt represents to ED today with AMS, dizziness. Pt is poor historian.  He had a witnessed seizure in ER and received Ativan 4mg IVP.  Upon my arrival in ED pt had bitten his tongue and aspirated on blood.  He was hypoxic and required emergent intubation/see intubation note.  Pt unable to offer any further information.  No family available at this time for more history. (20 Mar 2020 21:38)    REVIEW OF SYSTEMS:    Patient denied fever, chills, abdominal pain, nausea, vomiting, cough, shortness of breath, chest pain or palpitations    Vital Signs Last 24 Hrs  T(C): 37.8 (27 Mar 2020 08:00), Max: 37.9 (26 Mar 2020 17:24)  T(F): 100 (27 Mar 2020 08:00), Max: 100.3 (26 Mar 2020 17:24)  HR: 101 (27 Mar 2020 08:00) (90 - 101)  BP: 144/96 (27 Mar 2020 08:00) (144/64 - 171/90)  BP(mean): --  RR: 18 (27 Mar 2020 08:00) (17 - 19)  SpO2: 96% (27 Mar 2020 08:00) (96% - 98%)I&O's Summary    26 Mar 2020 07:01  -  27 Mar 2020 07:00  --------------------------------------------------------  IN: 310 mL / OUT: 0 mL / NET: 310 mL    27 Mar 2020 07:01  -  27 Mar 2020 11:59  --------------------------------------------------------  IN: 0 mL / OUT: 1 mL / NET: -1 mL      PHYSICAL EXAM:  GENERAL: NAD,   HEENT: PERRL, +EOMI, anicteric, no [place]  NECK: Supple, No JVD   CHEST/LUNG: CTA bilaterally; Normal effort  HEART: S1S2 Normal intensity, no murmurs, gallops or rubs noted  ABDOMEN: Soft, BS Normoactive, NT, ND, no HSM noted  EXTREMITIES:  2+ radial and DP pulses noted, no clubbing, cyanosis, or edema noted, Limited mobility   SKIN: No rashes or lesions noted  NEURO: Lethargy and confusion, no focal deficits noted, CN II-XII intact  PSYCH: Delirium depressed mood and affect; insight/judgement inappropriate  LABS:                        10.5   7.38  )-----------( 197      ( 27 Mar 2020 08:07 )             32.9     03-27    146<H>  |  110<H>  |  28.0<H>  ----------------------------<  126<H>  4.1   |  23.0  |  1.31<H>    Ca    8.0<L>      27 Mar 2020 08:07  Phos  4.2     03-27  Mg     2.4     03-27    TPro  6.5<L>  /  Alb  2.4<L>  /  TBili  1.0  /  DBili  x   /  AST  75<H>  /  ALT  56<H>  /  AlkPhos  68  03-27        RADIOLOGY & ADDITIONAL TESTS:    MEDICATIONS:  MEDICATIONS  (STANDING):  ascorbic acid 500 milliGRAM(s) Oral daily  chlorhexidine 0.12% Liquid 15 milliLiter(s) Oral Mucosa two times a day  chlorhexidine 4% Liquid 1 Application(s) Topical <User Schedule>  enoxaparin Injectable 40 milliGRAM(s) SubCutaneous daily  folic acid 1 milliGRAM(s) Oral daily  metoprolol tartrate 25 milliGRAM(s) Oral every 8 hours  multivitamin 1 Tablet(s) Oral daily  OLANZapine Disintegrating Tablet 5 milliGRAM(s) Oral two times a day  piperacillin/tazobactam IVPB.. 3.375 Gram(s) IV Intermittent every 8 hours  thiamine IVPB 200 milliGRAM(s) IV Intermittent daily  valproate sodium IVPB 500 milliGRAM(s) IV Intermittent every 8 hours    MEDICATIONS  (PRN):  acetaminophen    Suspension .. 650 milliGRAM(s) Oral every 6 hours PRN Temp greater or equal to 38.5C (101.3F)  labetalol Injectable 10 milliGRAM(s) IV Push every 4 hours PRN Systolic blood pressure > 160  LORazepam   Injectable 2 milliGRAM(s) IV Push every 6 hours PRN Agitation  morphine  - Injectable 2 milliGRAM(s) IV Push every 4 hours PRN Severe Pain (7 - 10)

## 2020-03-27 NOTE — PROGRESS NOTE ADULT - ASSESSMENT
70 yr male  with fever seizure confusion and encephalopathy with positive COVID-19.  Acute resp failure for COVID-19 resolved , extubed and tolerating oxygen via nasal cannula       Problem/Plan - 1:  ·  Problem: Pneumonia, viral.  Plan: Present on admission   CXR showing bilateral opacity.   Likely secondary to COVID-19   Zithromax 500mg iv qd.      Problem/Plan - 2:  ·  Problem: Acute respiratory failure with hypoxia.  Plan: Now extubated and tolerating oxygen via NC     Problem/Plan - 3:  ·  Problem: Seizure.  Plan: Unclear etiology.    May be secondary to Covid cerebritis or encephalitis   CSF studies is non specific   Vancyclovir is discontinued   Depakote 500mg iv q8  EKG show no epileptiform pattern   Eventual brain MRI seizure protocol when agitations resolve or is controlled      Problem/Plan - 4:  ·  Problem: Essential hypertension.  Plan: Metoprolol 50mg po bid     Problem/Plan - 5:  ·  Problem: Encephalopathy acute.  Plan: Ativan. Zyprexa  Thiamine   Depakote.   Supportive care.      Problem/Plan - 6:  Problem: Acute pain of left shoulder. Plan: Shoulder xray showing minimally displaced left proximal humeral neck fracture.  Ortho reviewed.  Arm Sling. Finger ROM to help resolution of forearm and hand edema.    Disp:  PT.  Home when acute encephalopathy resolved.

## 2020-03-27 NOTE — PROGRESS NOTE ADULT - ASSESSMENT
69y/o  Male with h/o HTN. Patient presented to ED 3/20 with dizziness and was discharged after evaluation in the ER. He returned to the ER 3/20 with AMS, and had a seizure episode in the ER. Patient was given ativan and during the seizure had a bite to this tongue and aspirated blood.  He was hypoxic and required emergent intubation. Patient was febrile in the ER no leukocytosis. He was admitted to MICU, COVID PCR was sent and is positive. Patient also started on Acyclovir for ? encephalitis given seizure episode.       COVID 19  encephalopathy  Seizure episode  AMS  Positive blood culture  ODESSA      - Blood cultures with CoNS in 1 of 4 bottles, likely contamination  - COVID 19 +  - RVP negative  - CXR with rt sided pneumonia  - UA with no UTI  - Procalcitonin level 2.45  - Completed Zosyn 3/27/20  - CSF HSV PCR negative  - CSF PCR negative  - Called lab, unable to do COVID CSF PCR  - Trend Fever  - Trend Leukocytosis      Will sign off. Please call PRN.

## 2020-03-27 NOTE — PROGRESS NOTE ADULT - SUBJECTIVE AND OBJECTIVE BOX
[facility]  INFECTIOUS DISEASES AND INTERNAL MEDICINE at [place]  =======================================================  Collins Casarez MD  Diplomates American Board of Internal Medicine and Infectious Diseases  Tel: [phone]      Fax: [phone]  =======================================================    SAGE CAMARA [id number]    Follow up: COVID 19    s/p extubation  s/p LP 3/23/20    Patient is awake and alert to person, month, year, hospital and town he lives in    Clinically improving    Afebrile    Allergies:  No Known Allergies      Antibiotics:  piperacillin/tazobactam IVPB.. 3.375 Gram(s) IV Intermittent every 8 hours       REVIEW OF SYSTEMS:  Unable to obtain due to medical condition       Physical Exam:  ICU Vital Signs Last 24 Hrs  T(C): 37.8 (27 Mar 2020 08:00), Max: 37.9 (26 Mar 2020 17:24)  T(F): 100 (27 Mar 2020 08:00), Max: 100.3 (26 Mar 2020 17:24)  HR: 101 (27 Mar 2020 08:00) (90 - 101)  BP: 144/96 (27 Mar 2020 08:00) (144/64 - 171/90)  RR: 18 (27 Mar 2020 08:00) (17 - 19)  SpO2: 96% (27 Mar 2020 08:00) (96% - 98%)      GEN: Awake, NAD  HEENT: normocephalic and atraumatic. EOMI. PERRL.  Anicteric  NECK: Supple.   LUNGS: Course BS B/L  HEART: Regular rate and rhythm   ABDOMEN: Soft, nontender, and nondistended.  Positive bowel sounds.    : No CVA tenderness  EXTREMITIES: Without any edema.  MSK: No joint swelling  NEUROLOGIC: Awake to stimuli, oriented to  person, month, year, hospital and town he lives in  PSYCHIATRIC: unable to access   SKIN: No Rash      Labs:  03-27    146<H>  |  110<H>  |  28.0<H>  ----------------------------<  126<H>  4.1   |  23.0  |  1.31<H>    Ca    8.0<L>      27 Mar 2020 08:07  Phos  4.2     03-27  Mg     2.4     03-27    TPro  6.5<L>  /  Alb  2.4<L>  /  TBili  1.0  /  DBili  x   /  AST  75<H>  /  ALT  56<H>  /  AlkPhos  68  03-27                          10.5   7.38  )-----------( 197      ( 27 Mar 2020 08:07 )             32.9       LIVER FUNCTIONS - ( 27 Mar 2020 08:07 )  Alb: 2.4 g/dL / Pro: 6.5 g/dL / ALK PHOS: 68 U/L / ALT: 56 U/L / AST: 75 U/L / GGT: x             Procalcitonin, Serum: 2.45 ng/mL (03-21-20 @ 04:29)        RECENT CULTURES:  03-24 @ 09:22 .Blood     No growth at 48 hours      03-23 @ 16:13 .CSF     No growth at 3 days.  No polymorphonuclear cells seen  No organisms seen by cytocentrifuge      03-22 @ 16:32 .Sputum     Normal Respiratory Danay present  Few polymorphonuclear leukocytes per low power field  Rare Squamous epithelial cells per low power field  No organisms seen per oil power field      03-21 @ 17:22 .Blood Coag Negative Staphylococcus  Blood Culture PCR    Growth in anaerobic bottle: Coag Negative Staphylococcus  Anaerobic Bottle: 14:46 Hours to positivity  Aerobic Bottle: No growth at 5 days.  ***Blood Panel PCR results on this specimen are available  approximately 3 hours after the Gram stain result.***  Gram stain, PCR, and/or culture results may not always  correspond due to difference in methodologies.  ************************************************************  This PCR assay was performed using [employer].  The following targets are tested for: Enterococcus,  vancomycin resistant enterococci, Listeria monocytogenes,  coagulase negative staphylococci, S. aureus,  methicillin resistant S. aureus, Streptococcus agalactiae  (Group B), S. pneumoniae, S. pyogenes (Group A),  Acinetobacter baumannii, Enterobacter cloacae, E. coli,  Klebsiella oxytoca, K. pneumoniae, Proteus sp.,  Serratia marcescens, Haemophilus influenzae,  Neisseria meningitidis, Pseudomonas aeruginosa, Candida  albicans, C. glabrata, C krusei, C parapsilosis,  C. tropicalis and the KPC resistance gene.      03-21 @ 07:12 .Blood     No growth at 5 days.      03-21 @ 01:24    [facility]        COVID-19 PCR (03.21.20 @ 14:21)    COVID-19 PCR: Detected: LDT - Laboratory Developed Test All “detected” results on this new test  are considered presumptively positive results, are clinically actionable,  and specimens will be forwarded to [facility] for confirmation testing.  Another report (corrected report) will only be issued if discordant  results occur.  This test has been validated by [facility] to be accurate;  though it has not been FDA cleared/approved by the usual pathway.  As with all laboratory tests, results should be correlated with clinical  findings.

## 2020-03-28 LAB
ALBUMIN SERPL ELPH-MCNC: 2.2 G/DL — LOW (ref 3.3–5.2)
ALP SERPL-CCNC: 77 U/L — SIGNIFICANT CHANGE UP (ref 40–120)
ALT FLD-CCNC: 86 U/L — HIGH
ANION GAP SERPL CALC-SCNC: 15 MMOL/L — SIGNIFICANT CHANGE UP (ref 5–17)
AST SERPL-CCNC: 126 U/L — HIGH
BASOPHILS # BLD AUTO: 0.01 K/UL — SIGNIFICANT CHANGE UP (ref 0–0.2)
BASOPHILS NFR BLD AUTO: 0.1 % — SIGNIFICANT CHANGE UP (ref 0–2)
BILIRUB SERPL-MCNC: 0.8 MG/DL — SIGNIFICANT CHANGE UP (ref 0.4–2)
BUN SERPL-MCNC: 25 MG/DL — HIGH (ref 8–20)
CALCIUM SERPL-MCNC: 8.3 MG/DL — LOW (ref 8.6–10.2)
CHLORIDE SERPL-SCNC: 109 MMOL/L — HIGH (ref 98–107)
CO2 SERPL-SCNC: 20 MMOL/L — LOW (ref 22–29)
CREAT SERPL-MCNC: 1.07 MG/DL — SIGNIFICANT CHANGE UP (ref 0.5–1.3)
EOSINOPHIL # BLD AUTO: 0.04 K/UL — SIGNIFICANT CHANGE UP (ref 0–0.5)
EOSINOPHIL NFR BLD AUTO: 0.4 % — SIGNIFICANT CHANGE UP (ref 0–6)
GLUCOSE SERPL-MCNC: 142 MG/DL — HIGH (ref 70–99)
HCT VFR BLD CALC: 30 % — LOW (ref 39–50)
HGB BLD-MCNC: 9.6 G/DL — LOW (ref 13–17)
IMM GRANULOCYTES NFR BLD AUTO: 2.1 % — HIGH (ref 0–1.5)
LYMPHOCYTES # BLD AUTO: 0.97 K/UL — LOW (ref 1–3.3)
LYMPHOCYTES # BLD AUTO: 10.6 % — LOW (ref 13–44)
MAGNESIUM SERPL-MCNC: 2.5 MG/DL — SIGNIFICANT CHANGE UP (ref 1.6–2.6)
MCHC RBC-ENTMCNC: 27.9 PG — SIGNIFICANT CHANGE UP (ref 27–34)
MCHC RBC-ENTMCNC: 32 GM/DL — SIGNIFICANT CHANGE UP (ref 32–36)
MCV RBC AUTO: 87.2 FL — SIGNIFICANT CHANGE UP (ref 80–100)
MONOCYTES # BLD AUTO: 0.97 K/UL — HIGH (ref 0–0.9)
MONOCYTES NFR BLD AUTO: 10.6 % — SIGNIFICANT CHANGE UP (ref 2–14)
NEUTROPHILS # BLD AUTO: 6.94 K/UL — SIGNIFICANT CHANGE UP (ref 1.8–7.4)
NEUTROPHILS NFR BLD AUTO: 76.2 % — SIGNIFICANT CHANGE UP (ref 43–77)
NRBC # BLD: 1 /100 WBCS — HIGH (ref 0–0)
PHOSPHATE SERPL-MCNC: 3.3 MG/DL — SIGNIFICANT CHANGE UP (ref 2.4–4.7)
PLATELET # BLD AUTO: 222 K/UL — SIGNIFICANT CHANGE UP (ref 150–400)
POTASSIUM SERPL-MCNC: 4.3 MMOL/L — SIGNIFICANT CHANGE UP (ref 3.5–5.3)
POTASSIUM SERPL-SCNC: 4.3 MMOL/L — SIGNIFICANT CHANGE UP (ref 3.5–5.3)
PROT SERPL-MCNC: 6.4 G/DL — LOW (ref 6.6–8.7)
RBC # BLD: 3.44 M/UL — LOW (ref 4.2–5.8)
RBC # FLD: 15.3 % — HIGH (ref 10.3–14.5)
SODIUM SERPL-SCNC: 144 MMOL/L — SIGNIFICANT CHANGE UP (ref 135–145)
WBC # BLD: 9.12 K/UL — SIGNIFICANT CHANGE UP (ref 3.8–10.5)
WBC # FLD AUTO: 9.12 K/UL — SIGNIFICANT CHANGE UP (ref 3.8–10.5)

## 2020-03-28 PROCEDURE — 99233 SBSQ HOSP IP/OBS HIGH 50: CPT

## 2020-03-28 PROCEDURE — 70551 MRI BRAIN STEM W/O DYE: CPT | Mod: 26

## 2020-03-28 RX ADMIN — Medication 50 MILLIGRAM(S): at 05:39

## 2020-03-28 RX ADMIN — Medication 27.5 MILLIGRAM(S): at 05:39

## 2020-03-28 RX ADMIN — CHLORHEXIDINE GLUCONATE 15 MILLILITER(S): 213 SOLUTION TOPICAL at 05:39

## 2020-03-28 RX ADMIN — CHLORHEXIDINE GLUCONATE 1 APPLICATION(S): 213 SOLUTION TOPICAL at 06:04

## 2020-03-28 RX ADMIN — Medication 1 MILLIGRAM(S): at 12:01

## 2020-03-28 RX ADMIN — Medication 50 MILLIGRAM(S): at 17:15

## 2020-03-28 RX ADMIN — ENOXAPARIN SODIUM 40 MILLIGRAM(S): 100 INJECTION SUBCUTANEOUS at 12:01

## 2020-03-28 RX ADMIN — Medication 1 TABLET(S): at 12:02

## 2020-03-28 RX ADMIN — OLANZAPINE 5 MILLIGRAM(S): 15 TABLET, FILM COATED ORAL at 17:15

## 2020-03-28 RX ADMIN — CHLORHEXIDINE GLUCONATE 15 MILLILITER(S): 213 SOLUTION TOPICAL at 17:15

## 2020-03-28 RX ADMIN — Medication 500 MILLIGRAM(S): at 12:00

## 2020-03-28 RX ADMIN — Medication 27.5 MILLIGRAM(S): at 12:02

## 2020-03-28 RX ADMIN — Medication 27.5 MILLIGRAM(S): at 21:11

## 2020-03-28 RX ADMIN — OLANZAPINE 5 MILLIGRAM(S): 15 TABLET, FILM COATED ORAL at 12:02

## 2020-03-28 NOTE — PROGRESS NOTE ADULT - SUBJECTIVE AND OBJECTIVE BOX
INTERVAL HISTORY:  no interval neurologic changes,  Nop seizures      VITAL SIGNS:  Vital Signs Last 24 Hrs  T(C): 37.4 (28 Mar 2020 09:00), Max: 38.2 (27 Mar 2020 15:45)  T(F): 99.3 (28 Mar 2020 09:00), Max: 100.7 (27 Mar 2020 15:45)  HR: 92 (28 Mar 2020 09:00) (91 - 98)  BP: 158/90 (28 Mar 2020 09:00) (155/81 - 168/90)  BP(mean): --  RR: 16 (28 Mar 2020 09:00) (16 - 18)  SpO2: 98% (28 Mar 2020 09:00) (93% - 98%)    PHYSICAL EXAMINATION:    Mentation:    Language/Speech:  CN:  Visual Fields:  Motor:  Sensory:  DTR:  Babinski:      MEDS:  MEDICATIONS  (STANDING):  ascorbic acid 500 milliGRAM(s) Oral daily  chlorhexidine 0.12% Liquid 15 milliLiter(s) Oral Mucosa two times a day  chlorhexidine 4% Liquid 1 Application(s) Topical <User Schedule>  enoxaparin Injectable 40 milliGRAM(s) SubCutaneous daily  folic acid 1 milliGRAM(s) Oral daily  metoprolol tartrate 50 milliGRAM(s) Oral two times a day  multivitamin 1 Tablet(s) Oral daily  OLANZapine Disintegrating Tablet 5 milliGRAM(s) Oral two times a day  valproate sodium IVPB 500 milliGRAM(s) IV Intermittent every 8 hours    MEDICATIONS  (PRN):  acetaminophen    Suspension .. 650 milliGRAM(s) Oral every 6 hours PRN Temp greater or equal to 38.5C (101.3F)  labetalol Injectable 10 milliGRAM(s) IV Push every 4 hours PRN Systolic blood pressure > 160  LORazepam   Injectable 2 milliGRAM(s) IV Push every 6 hours PRN Agitation  morphine  - Injectable 2 milliGRAM(s) IV Push every 4 hours PRN Severe Pain (7 - 10)      LABS:                          9.6    9.12  )-----------( 222      ( 28 Mar 2020 08:11 )             30.0     03-28    144  |  109<H>  |  25.0<H>  ----------------------------<  142<H>  4.3   |  20.0<L>  |  1.07    Ca    8.3<L>      28 Mar 2020 08:11  Phos  3.3     03-28  Mg     2.5     03-28    TPro  6.4<L>  /  Alb  2.2<L>  /  TBili  0.8  /  DBili  x   /  AST  126<H>  /  ALT  86<H>  /  AlkPhos  77  03-28    LIVER FUNCTIONS - ( 28 Mar 2020 08:11 )  Alb: 2.2 g/dL / Pro: 6.4 g/dL / ALK PHOS: 77 U/L / ALT: 86 U/L / AST: 126 U/L / GGT: x               RADIOLOGY & ADDITIONAL STUDIES:    MR completed. Results pending      IMPRESSION & PLAN:    Resp failure/Covid, seizure  Remains on [id number] q8h,  No evidenc of further seizures on 48hours of EEG.   Suspect seizure was from transient anoxia  Will review mri report. Further recommendations if MRI shows actionable findings.

## 2020-03-28 NOTE — SWALLOW BEDSIDE ASSESSMENT ADULT - SWALLOW EVAL: DIAGNOSIS
Mild oral dysphagia confounded by reduced cognition. Pharyngeal stage appears functional for solids and honey thickened liquids. Cannot r/o pharyngeal dysphagia with nectar/thin liquids.

## 2020-03-28 NOTE — SWALLOW BEDSIDE ASSESSMENT ADULT - PHARYNGEAL PHASE
Within functional limits Delayed throat clear post oral intake/Multiple swallows Throat clear post oral intake

## 2020-03-28 NOTE — SWALLOW BEDSIDE ASSESSMENT ADULT - ORAL PHASE
mildly delayed oral transit time, however functional Delayed oral transit time/Decreased anterior-posterior movement of the bolus Within functional limits

## 2020-03-28 NOTE — SWALLOW BEDSIDE ASSESSMENT ADULT - SWALLOW EVAL: RECOMMENDED FEEDING/EATING TECHNIQUES
crush medication (when feasible)/no straws/maintain upright posture during/after eating for 30 mins/oral hygiene/allow for swallow between intakes/small sips/bites

## 2020-03-28 NOTE — SWALLOW BEDSIDE ASSESSMENT ADULT - SLP PERTINENT HISTORY OF CURRENT PROBLEM
Per MD note: "70 yr male  with fever seizure confusion and encephalopathy with positive COVID-19.  Acute resp failure for COVID-19 resolved , extubated and tolerating oxygen via nasal cannula....Pt is s/p seizure in the ED, emergently intubated. Now with NGT

## 2020-03-28 NOTE — SWALLOW BEDSIDE ASSESSMENT ADULT - ASR SWALLOW ASPIRATION MONITOR
cough/upper respiratory infection/change of breathing pattern/position upright (90Y)/gurgly voice/oral hygiene/pneumonia/fever/throat clearing

## 2020-03-28 NOTE — SWALLOW BEDSIDE ASSESSMENT ADULT - MODE OF PRESENTATION
fed by clinician/spoon spoon/fed by clinician cup/spoon/fed by clinician spoon/cup/fed by clinician spoon

## 2020-03-28 NOTE — SWALLOW BEDSIDE ASSESSMENT ADULT - ORAL PREPARATORY PHASE
Mildly reduced attention to bolus reduced attention to bolus Within functional limits suspect posterior loss

## 2020-03-28 NOTE — PROGRESS NOTE ADULT - ASSESSMENT
70 yr male  with fever seizure confusion and encephalopathy with positive COVID-19.  Acute resp failure for COVID-19 resolved , extubed and tolerating oxygen via nasal cannula       Problem/Plan - 1:  ·  Problem: Pneumonia, viral.  Plan: Present on admission   CXR showing bilateral opacity, Likely secondary to COVID-19, completed Zithromax      Problem/Plan - 2:  ·  Problem: Acute respiratory failure with hypoxia.  Plan: s/p extubated day 5 and tolerating oxygen via NC, being weaned off.      Problem/Plan - 3:  ·  Problem: Seizure.  Plan: Unclear etiology, CSF studies is non specific, Vancyclovir is discontinued   Depakote 500mg iv q8, EEG show no epileptiform pattern, seen and followed by Neurology,    Eventual brain MRI seizure protocol      Problem/Plan - 4:  ·  Problem: Essential hypertension.  Plan: Metoprolol 50mg po bid     Problem/Plan - 5:  Problem: Encephalopathy acute, likely acute metabolic encephalopathy,   Plan: Ativan. Zyprexa, Thiamine   Depakote, looks like improving, will continue with 1:1 for now, he has been seen by speech and swallow team and has been started diet as recommended,   NG feeding to be held, Supportive care.     Problem/Plan - 6:  Problem: Acute pain of left shoulder. Plan: Shoulder xray showing minimally displaced left proximal humeral neck fracture,  Seen by Ortho recommended. Arm Sling, Finger ROM to help resolution of forearm and hand edema.  CT of left shoulder once patient is more alert and oriented.     Disp:  PT eval

## 2020-03-28 NOTE — SWALLOW BEDSIDE ASSESSMENT ADULT - SLP GENERAL OBSERVATIONS
Pt received awake in bed, reduced cognition, grossly 0x2, +confusion and unable to provide history, +NGT, clear vocal quality, moaning occasionally and indicating left side pain, however unable to quantify 2* cognition, 1:1 present at bedside

## 2020-03-28 NOTE — PROGRESS NOTE ADULT - SUBJECTIVE AND OBJECTIVE BOX
SAGE CAMARA    [id number]    70y      Male    Patient is a 70y old  Male who presents with a chief complaint of respiratory failure, seizure, aspiration pna (27 Mar 2020 11:58)      INTERVAL HPI/OVERNIGHT EVENTS:    Patient is feeling improvement of SOB, he is more alert and Oriented, he is till lethargic, seen by speech therapy as diet has been started as per recommendations.     REVIEW OF SYSTEMS:    CONSTITUTIONAL: No fever, some fatigue  RESPIRATORY: No cough, improving shortness of breath  CARDIOVASCULAR: No chest pain, palpitations  GASTROINTESTINAL: No abdominal, No nausea, vomiting  NEUROLOGICAL: No headaches,  loss of strength.  MISCELLANEOUS: No joint swelling or pain       Vital Signs Last 24 Hrs  T(C): 37.4 (28 Mar 2020 09:00), Max: 38.2 (27 Mar 2020 15:45)  T(F): 99.3 (28 Mar 2020 09:00), Max: 100.7 (27 Mar 2020 15:45)  HR: 92 (28 Mar 2020 09:00) (91 - 98)  BP: 158/90 (28 Mar 2020 09:00) (155/81 - 168/90)  RR: 16 (28 Mar 2020 09:00) (16 - 18)  SpO2: 98% (28 Mar 2020 09:00) (93% - 98%)    PHYSICAL EXAM:    GENERAL: Elderly male looking lethargic  HEENT: PERRL, has NG tube in  NECK: soft, Supple, No JVD,   CHEST/LUNG: Decrease air entry bilaterally; No wheezing, some crackles   HEART: S1S2+, Regular rate and rhythm; No murmurs  ABDOMEN: Soft, Nontender, Nondistended; Bowel sounds present  EXTREMITIES:  1+ Peripheral Pulses, No edema, left arm is swelling, left should unable to move because of pain, has left should sling on  SKIN: No rashes or lesions  NEURO: OX3, he is able to move his all extremities except left arm because of pain, no facial asymmetry   PSYCH: normal mood      LABS:                        9.6    9.12  )-----------( 222      ( 28 Mar 2020 08:11 )             30.0     03-28    144  |  109<H>  |  25.0<H>  ----------------------------<  142<H>  4.3   |  20.0<L>  |  1.07    Ca    8.3<L>      28 Mar 2020 08:11  Phos  3.3     03-28  Mg     2.5     03-28    TPro  6.4<L>  /  Alb  2.2<L>  /  TBili  0.8  /  DBili  x   /  AST  126<H>  /  ALT  86<H>  /  AlkPhos  77  03-28            I&O's Summary    27 Mar 2020 07:01  -  28 Mar 2020 07:00  --------------------------------------------------------  IN: 450 mL / OUT: 301 mL / NET: 149 mL    28 Mar 2020 07:01  -  28 Mar 2020 14:43  --------------------------------------------------------  IN: 500 mL / OUT: 0 mL / NET: 500 mL        MEDICATIONS  (STANDING):  ascorbic acid 500 milliGRAM(s) Oral daily  chlorhexidine 0.12% Liquid 15 milliLiter(s) Oral Mucosa two times a day  chlorhexidine 4% Liquid 1 Application(s) Topical <User Schedule>  enoxaparin Injectable 40 milliGRAM(s) SubCutaneous daily  folic acid 1 milliGRAM(s) Oral daily  metoprolol tartrate 50 milliGRAM(s) Oral two times a day  multivitamin 1 Tablet(s) Oral daily  OLANZapine Disintegrating Tablet 5 milliGRAM(s) Oral two times a day  valproate sodium IVPB 500 milliGRAM(s) IV Intermittent every 8 hours    MEDICATIONS  (PRN):  acetaminophen    Suspension .. 650 milliGRAM(s) Oral every 6 hours PRN Temp greater or equal to 38.5C (101.3F)  labetalol Injectable 10 milliGRAM(s) IV Push every 4 hours PRN Systolic blood pressure > 160  LORazepam   Injectable 2 milliGRAM(s) IV Push every 6 hours PRN Agitation  morphine  - Injectable 2 milliGRAM(s) IV Push every 4 hours PRN Severe Pain (7 - 10)

## 2020-03-29 LAB
ALBUMIN SERPL ELPH-MCNC: 2.1 G/DL — LOW (ref 3.3–5.2)
ALP SERPL-CCNC: 87 U/L — SIGNIFICANT CHANGE UP (ref 40–120)
ALT FLD-CCNC: 167 U/L — HIGH
ANION GAP SERPL CALC-SCNC: 13 MMOL/L — SIGNIFICANT CHANGE UP (ref 5–17)
AST SERPL-CCNC: 208 U/L — HIGH
BASOPHILS # BLD AUTO: 0.02 K/UL — SIGNIFICANT CHANGE UP (ref 0–0.2)
BASOPHILS NFR BLD AUTO: 0.2 % — SIGNIFICANT CHANGE UP (ref 0–2)
BILIRUB SERPL-MCNC: 0.7 MG/DL — SIGNIFICANT CHANGE UP (ref 0.4–2)
BUN SERPL-MCNC: 22 MG/DL — HIGH (ref 8–20)
CALCIUM SERPL-MCNC: 8.3 MG/DL — LOW (ref 8.6–10.2)
CHLORIDE SERPL-SCNC: 110 MMOL/L — HIGH (ref 98–107)
CO2 SERPL-SCNC: 18 MMOL/L — LOW (ref 22–29)
CREAT SERPL-MCNC: 0.92 MG/DL — SIGNIFICANT CHANGE UP (ref 0.5–1.3)
CULTURE RESULTS: SIGNIFICANT CHANGE UP
EOSINOPHIL # BLD AUTO: 0.07 K/UL — SIGNIFICANT CHANGE UP (ref 0–0.5)
EOSINOPHIL NFR BLD AUTO: 0.8 % — SIGNIFICANT CHANGE UP (ref 0–6)
GLUCOSE SERPL-MCNC: 109 MG/DL — HIGH (ref 70–99)
HCT VFR BLD CALC: 44.1 % — SIGNIFICANT CHANGE UP (ref 39–50)
HGB BLD-MCNC: 13.6 G/DL — SIGNIFICANT CHANGE UP (ref 13–17)
IMM GRANULOCYTES NFR BLD AUTO: 1.3 % — SIGNIFICANT CHANGE UP (ref 0–1.5)
LYMPHOCYTES # BLD AUTO: 0.8 K/UL — LOW (ref 1–3.3)
LYMPHOCYTES # BLD AUTO: 9.4 % — LOW (ref 13–44)
MAGNESIUM SERPL-MCNC: 2.5 MG/DL — SIGNIFICANT CHANGE UP (ref 1.6–2.6)
MCHC RBC-ENTMCNC: 27.4 PG — SIGNIFICANT CHANGE UP (ref 27–34)
MCHC RBC-ENTMCNC: 30.8 GM/DL — LOW (ref 32–36)
MCV RBC AUTO: 88.9 FL — SIGNIFICANT CHANGE UP (ref 80–100)
MONOCYTES # BLD AUTO: 0.77 K/UL — SIGNIFICANT CHANGE UP (ref 0–0.9)
MONOCYTES NFR BLD AUTO: 9.1 % — SIGNIFICANT CHANGE UP (ref 2–14)
NEUTROPHILS # BLD AUTO: 6.73 K/UL — SIGNIFICANT CHANGE UP (ref 1.8–7.4)
NEUTROPHILS NFR BLD AUTO: 79.2 % — HIGH (ref 43–77)
NRBC # BLD: 2 /100 WBCS — HIGH (ref 0–0)
PHOSPHATE SERPL-MCNC: 3.5 MG/DL — SIGNIFICANT CHANGE UP (ref 2.4–4.7)
PLATELET # BLD AUTO: SIGNIFICANT CHANGE UP K/UL (ref 150–400)
POTASSIUM SERPL-MCNC: 5.2 MMOL/L — SIGNIFICANT CHANGE UP (ref 3.5–5.3)
POTASSIUM SERPL-SCNC: 5.2 MMOL/L — SIGNIFICANT CHANGE UP (ref 3.5–5.3)
PROT SERPL-MCNC: 6.7 G/DL — SIGNIFICANT CHANGE UP (ref 6.6–8.7)
RBC # BLD: 4.96 M/UL — SIGNIFICANT CHANGE UP (ref 4.2–5.8)
RBC # FLD: 16.3 % — HIGH (ref 10.3–14.5)
SODIUM SERPL-SCNC: 141 MMOL/L — SIGNIFICANT CHANGE UP (ref 135–145)
SPECIMEN SOURCE: SIGNIFICANT CHANGE UP
WBC # BLD: 8.5 K/UL — SIGNIFICANT CHANGE UP (ref 3.8–10.5)
WBC # FLD AUTO: 8.5 K/UL — SIGNIFICANT CHANGE UP (ref 3.8–10.5)

## 2020-03-29 PROCEDURE — 99232 SBSQ HOSP IP/OBS MODERATE 35: CPT

## 2020-03-29 RX ADMIN — Medication 1 MILLIGRAM(S): at 12:17

## 2020-03-29 RX ADMIN — CHLORHEXIDINE GLUCONATE 1 APPLICATION(S): 213 SOLUTION TOPICAL at 09:22

## 2020-03-29 RX ADMIN — Medication 1 TABLET(S): at 12:17

## 2020-03-29 RX ADMIN — ENOXAPARIN SODIUM 40 MILLIGRAM(S): 100 INJECTION SUBCUTANEOUS at 12:17

## 2020-03-29 RX ADMIN — CHLORHEXIDINE GLUCONATE 15 MILLILITER(S): 213 SOLUTION TOPICAL at 18:02

## 2020-03-29 RX ADMIN — OLANZAPINE 5 MILLIGRAM(S): 15 TABLET, FILM COATED ORAL at 12:16

## 2020-03-29 RX ADMIN — OLANZAPINE 5 MILLIGRAM(S): 15 TABLET, FILM COATED ORAL at 18:02

## 2020-03-29 RX ADMIN — Medication 50 MILLIGRAM(S): at 05:39

## 2020-03-29 RX ADMIN — Medication 27.5 MILLIGRAM(S): at 12:17

## 2020-03-29 RX ADMIN — Medication 500 MILLIGRAM(S): at 12:17

## 2020-03-29 RX ADMIN — Medication 27.5 MILLIGRAM(S): at 05:39

## 2020-03-29 RX ADMIN — Medication 27.5 MILLIGRAM(S): at 21:26

## 2020-03-29 RX ADMIN — CHLORHEXIDINE GLUCONATE 15 MILLILITER(S): 213 SOLUTION TOPICAL at 05:40

## 2020-03-29 RX ADMIN — Medication 50 MILLIGRAM(S): at 18:01

## 2020-03-29 NOTE — PROVIDER CONTACT NOTE (OTHER) - BACKGROUND
Pt was placed on CO because he was trying to pull out NGT. Pt put on diet 3/28 and NGT to be removed 3/29. negative...

## 2020-03-29 NOTE — PROGRESS NOTE ADULT - SUBJECTIVE AND OBJECTIVE BOX
SAGE CAMARA    [id number]    70y      Male    Patient is a 70y old  Male who presents with a chief complaint of respiratory failure, seizure, aspiration pna (28 Mar 2020 15:09)      INTERVAL HPI/OVERNIGHT EVENTS:    Patient is doing ok, no having sob, he is saturating well on RA, he is more alert and Oriented.  Pt has no fever, chills, chest pain.      REVIEW OF SYSTEMS:    CONSTITUTIONAL: No fever, some fatigue  RESPIRATORY: No cough, improving shortness of breath  CARDIOVASCULAR: No chest pain, palpitations  GASTROINTESTINAL: No abdominal, No nausea, vomiting  NEUROLOGICAL: No headaches,  loss of strength.  MISCELLANEOUS: No joint swelling or pain       Vital Signs Last 24 Hrs  T(C): 37.3 (28 Mar 2020 23:11), Max: 37.7 (28 Mar 2020 15:30)  T(F): 99.2 (28 Mar 2020 23:11), Max: 99.9 (28 Mar 2020 15:30)  HR: 85 (29 Mar 2020 05:30) (83 - 89)  BP: 157/83 (29 Mar 2020 05:30) (151/87 - 157/83)  RR: 18 (29 Mar 2020 08:23) (17 - 18)  SpO2: 95% (29 Mar 2020 08:23) (94% - 96%)    PHYSICAL EXAM:    GENERAL: Elderly male looking comfortable  HEENT: PERRL, has NG tube in  NECK: soft, Supple, No JVD,   CHEST/LUNG: Decrease air entry bilaterally; No wheezing, some crackles   HEART: S1S2+, Regular rate and rhythm; No murmurs  ABDOMEN: Soft, Nontender, Nondistended; Bowel sounds present  EXTREMITIES:  1+ Peripheral Pulses, No edema, left arm is better, left should unable to move because of pain, has left should sling on  SKIN: No rashes or lesions  NEURO: aOX3, he is able to move his all extremities except left arm because of pain, no facial asymmetry   PSYCH: normal mood      LABS:                        13.6   8.50  )-----------( Clumped    ( 29 Mar 2020 07:28 )             44.1     03-29    141  |  110<H>  |  22.0<H>  ----------------------------<  109<H>  5.2   |  18.0<L>  |  0.92    Ca    8.3<L>      29 Mar 2020 07:28  Phos  3.5     03-29  Mg     2.5     03-29    TPro  6.7  /  Alb  2.1<L>  /  TBili  0.7  /  DBili  x   /  AST  208<H>  /  ALT  167<H>  /  AlkPhos  87  03-29            I&O's Summary    28 Mar 2020 07:01  -  29 Mar 2020 07:00  --------------------------------------------------------  IN: 500 mL / OUT: 0 mL / NET: 500 mL        MEDICATIONS  (STANDING):  ascorbic acid 500 milliGRAM(s) Oral daily  chlorhexidine 0.12% Liquid 15 milliLiter(s) Oral Mucosa two times a day  chlorhexidine 4% Liquid 1 Application(s) Topical <User Schedule>  enoxaparin Injectable 40 milliGRAM(s) SubCutaneous daily  folic acid 1 milliGRAM(s) Oral daily  metoprolol tartrate 50 milliGRAM(s) Oral two times a day  multivitamin 1 Tablet(s) Oral daily  OLANZapine Disintegrating Tablet 5 milliGRAM(s) Oral two times a day  valproate sodium IVPB 500 milliGRAM(s) IV Intermittent every 8 hours    MEDICATIONS  (PRN):  acetaminophen    Suspension .. 650 milliGRAM(s) Oral every 6 hours PRN Temp greater or equal to 38.5C (101.3F)  labetalol Injectable 10 milliGRAM(s) IV Push every 4 hours PRN Systolic blood pressure > 160  LORazepam   Injectable 2 milliGRAM(s) IV Push every 6 hours PRN Agitation  morphine  - Injectable 2 milliGRAM(s) IV Push every 4 hours PRN Severe Pain (7 - 10)

## 2020-03-29 NOTE — PROVIDER CONTACT NOTE (OTHER) - ACTION/TREATMENT ORDERED:
Ofimerv 1,000mg IV x1 dose
keep NPO overnight until AM when seen by dietician
D/C 1:1 as condition no longer warrants.
give ativan now

## 2020-03-29 NOTE — PROGRESS NOTE ADULT - ASSESSMENT
70 yr male  with fever seizure confusion and encephalopathy with positive COVID-19.  Acute resp failure for COVID-19 resolved , extubed and tolerating oxygen via nasal cannula       Problem/Plan - 1:  ·  Problem: Pneumonia, viral.  Plan: Present on admission, CXR showing bilateral opacity, Likely secondary to COVID-19, completed Zithromax. he is saturating well, weaned off from supplemental oxygen.       Problem/Plan - 2:  ·  Problem: Acute respiratory failure with hypoxia.  Plan: s/p extubated day 6, no weaned off from supplement oxygen, saturating well on RA.      Problem/Plan - 3:  ·  Problem: Seizure.  Plan: Unclear etiology, CSF studies is non specific, Vancyclovir is discontinued   Depakote 500mg iv q8, EEG show no epileptiform pattern, seen and followed by Neurology,    Eventual brain MRI seizure protocol.      Problem/Plan - 4:  ·  Problem: Essential hypertension.  Plan: Metoprolol 50mg po bid.      Problem/Plan - 5:  Problem: Encephalopathy acute, likely acute metabolic encephalopathy,   Plan: Ativan. Zyprexa, Thiamine   Depakote, looks like improving, will d/c 1:1, speech and swallow team is following has been started diet as recommended,   NG d/peter. Supportive care, he is now AOX3, encephalopathy has been resolved, MRI done showed Mild periventricular white matter ischemia.      Problem/Plan - 6:  Problem: Acute pain of left shoulder. Plan: Shoulder xray showing minimally displaced left proximal humeral neck fracture,  Seen by Ortho recommended. Arm Sling, Finger ROM to help resolution of forearm and hand edema.  CT of left shoulder once patient is more alert and oriented.     Disp:  PT eval, likely rehab placement

## 2020-03-30 PROCEDURE — 99232 SBSQ HOSP IP/OBS MODERATE 35: CPT

## 2020-03-30 PROCEDURE — 73200 CT UPPER EXTREMITY W/O DYE: CPT | Mod: 26,LT

## 2020-03-30 RX ADMIN — Medication 1 TABLET(S): at 12:13

## 2020-03-30 RX ADMIN — ENOXAPARIN SODIUM 40 MILLIGRAM(S): 100 INJECTION SUBCUTANEOUS at 11:45

## 2020-03-30 RX ADMIN — Medication 50 MILLIGRAM(S): at 17:01

## 2020-03-30 RX ADMIN — Medication 1 MILLIGRAM(S): at 12:13

## 2020-03-30 RX ADMIN — CHLORHEXIDINE GLUCONATE 15 MILLILITER(S): 213 SOLUTION TOPICAL at 17:01

## 2020-03-30 RX ADMIN — Medication 27.5 MILLIGRAM(S): at 05:19

## 2020-03-30 RX ADMIN — CHLORHEXIDINE GLUCONATE 1 APPLICATION(S): 213 SOLUTION TOPICAL at 12:12

## 2020-03-30 RX ADMIN — Medication 27.5 MILLIGRAM(S): at 13:01

## 2020-03-30 RX ADMIN — Medication 27.5 MILLIGRAM(S): at 20:28

## 2020-03-30 RX ADMIN — Medication 500 MILLIGRAM(S): at 12:12

## 2020-03-30 RX ADMIN — OLANZAPINE 5 MILLIGRAM(S): 15 TABLET, FILM COATED ORAL at 12:12

## 2020-03-30 RX ADMIN — Medication 50 MILLIGRAM(S): at 05:19

## 2020-03-30 RX ADMIN — CHLORHEXIDINE GLUCONATE 15 MILLILITER(S): 213 SOLUTION TOPICAL at 05:19

## 2020-03-30 RX ADMIN — OLANZAPINE 5 MILLIGRAM(S): 15 TABLET, FILM COATED ORAL at 18:16

## 2020-03-30 NOTE — PROGRESS NOTE ADULT - ASSESSMENT
70 yr male  with fever seizure confusion and encephalopathy with positive COVID-19.  Acute resp failure for COVID-19 resolved , extubed and tolerating oxygen via nasal cannula.      Problem/Plan - 1:  ·  Problem: Pneumonia, viral.  Plan: Present on admission, CXR showing bilateral opacity, Likely secondary to COVID-19, completed Zithromax. he is saturating well, weaned off from supplemental oxygen.     Problem/Plan - 2:  ·  Problem: Acute respiratory failure with hypoxia.  Plan: s/p extubated day 7, now weaned off from supplement oxygen, saturating well on RA.      Problem/Plan - 3:  ·  Problem: Seizure.  Plan: Unclear etiology, CSF studies is non specific, Vancyclovir is discontinued   Depakote 500mg iv q8, EEG show no epileptiform pattern, seen and followed by Neurology, MRI brain is unremarkable, seizure protocol.   as per Neuro continue with same dose of depakot and follow up with Neurology as out patient.      Problem/Plan - 4:  ·  Problem: Essential hypertension.  Plan: Metoprolol 50mg po bid.      Problem/Plan - 5:  Problem: Encephalopathy acute, likely acute metabolic encephalopathy,   Plan: Ativan. Zyprexa, Thiamine   Depakote, looks like improving, speech and swallow team is following has been started diet as recommended,   NG d/peter. Supportive care, he is now AOX3, encephalopathy has been resolved, MRI done showed Mild periventricular white matter ischemia.      Problem/Plan - 6:  Problem: Acute pain of left shoulder. Plan: Shoulder xray showing minimally displaced left proximal humeral neck fracture,  Seen by Ortho recommended. Arm Sling, Finger ROM to help resolution of forearm and hand edema.  CT of left shoulder once patient is more alert and oriented.     Disp:  PT eval, likely rehab placement 70 yr male  with fever seizure confusion and encephalopathy with positive COVID-19.  Acute resp failure for COVID-19 resolved , extubed and tolerating oxygen via nasal cannula.      Problem/Plan - 1:  ·  Problem: Pneumonia, viral.  Plan: Present on admission, CXR showing bilateral opacity, Likely secondary to COVID-19, completed Zithromax. he is saturating well, weaned off from supplemental oxygen.     Problem/Plan - 2:  ·  Problem: Acute respiratory failure with hypoxia.  Plan: s/p extubated day 7, now weaned off from supplement oxygen, saturating well on RA.      Problem/Plan - 3:  ·  Problem: Seizure.  Plan: Unclear etiology, CSF studies is non specific, Vancyclovir is discontinued   Depakote 500mg iv q8, EEG show no epileptiform pattern, seen and followed by Neurology, MRI brain is unremarkable, seizure protocol.   as per Neuro continue with same dose of depakot and follow up with Neurology as out patient.      Problem/Plan - 4:  ·  Problem: Essential hypertension.  Plan: Metoprolol 50mg po bid.      Problem/Plan - 5:  Problem: Encephalopathy acute, likely acute metabolic encephalopathy,   Plan: Ativan. Zyprexa, Thiamine   Depakote, looks like improving, speech and swallow team is following has been started diet as recommended,   NG d/peter. Supportive care, he is now AOX3, encephalopathy has been resolved, MRI done showed Mild periventricular white matter ischemia.      Problem/Plan - 6:  Problem: Acute pain of left shoulder. Plan: Shoulder xray showing minimally displaced left proximal humeral neck fracture,  Seen by Ortho recommended. Arm Sling, Finger ROM to help resolution of forearm and hand edema.  CT of left shoulder done showed Redemonstrated comminuted fracture of the proximal left humerus involving the anterior medial aspect of the humeral head and lesser tuberosity. Persistent posterior dislocation of the humeral head. Fracture of the posterior aspect of the glenoid, Os acromiale. Subtle linear lucencies in the acromion are equivocal for normal variant versus nondisplaced fracture, seen by Orth as per them Pt will need surgical intervention when stable for definitive treatment. F/U with Dr Barr as out pt, Continue sling.       Disp:  PT eval, likely rehab placement

## 2020-03-30 NOTE — PROGRESS NOTE ADULT - ASSESSMENT
Impression:  Encephalopathy with seizure and pneumonia; s/p resp failure    Plan:    Continue Depakote 500mg po q8.  Neuro stable.  DVT prophylaxis recommended.  D/w Dr Clement.  Outpatient follow up at [facility] at [phone] or [phone].   Reconsult PRN.

## 2020-03-30 NOTE — PROGRESS NOTE ADULT - PROBLEM SELECTOR PROBLEM 2
Acute respiratory failure with hypoxia
Acute respiratory failure with hypoxia
Metabolic encephalopathy
Seizure
Acute respiratory failure with hypoxia
Aspiration pneumonia of both lower lobes, unspecified aspiration pneumonia type

## 2020-03-30 NOTE — PROGRESS NOTE ADULT - SUBJECTIVE AND OBJECTIVE BOX
INTERVAL HISTORY:  D/w Dr Clement and no new changes overnight and pt doing well and cognition improved and no further seizures.    No Known Allergies      VITAL SIGNS:  Vital Signs Last 24 Hrs  T(C): 36.7 (30 Mar 2020 07:49), Max: 36.8 (29 Mar 2020 17:14)  T(F): 98.1 (30 Mar 2020 07:49), Max: 98.3 (29 Mar 2020 17:14)  HR: 91 (30 Mar 2020 07:49) (78 - 91)  BP: 169/90 (30 Mar 2020 07:49) (153/82 - 169/90)  BP(mean): --  RR: 18 (30 Mar 2020 08:27) (18 - 20)  SpO2: 100% (30 Mar 2020 08:27) (92% - 100%)    PHYSICAL EXAMINATION:  Not seen due to COVID as lack of mask protection.    MEDS:  MEDICATIONS  (STANDING):  ascorbic acid 500 milliGRAM(s) Oral daily  chlorhexidine 0.12% Liquid 15 milliLiter(s) Oral Mucosa two times a day  chlorhexidine 4% Liquid 1 Application(s) Topical <User Schedule>  enoxaparin Injectable 40 milliGRAM(s) SubCutaneous daily  folic acid 1 milliGRAM(s) Oral daily  metoprolol tartrate 50 milliGRAM(s) Oral two times a day  multivitamin 1 Tablet(s) Oral daily  OLANZapine Disintegrating Tablet 5 milliGRAM(s) Oral two times a day  valproate sodium IVPB 500 milliGRAM(s) IV Intermittent every 8 hours    MEDICATIONS  (PRN):  acetaminophen    Suspension .. 650 milliGRAM(s) Oral every 6 hours PRN Temp greater or equal to 38.5C (101.3F)  labetalol Injectable 10 milliGRAM(s) IV Push every 4 hours PRN Systolic blood pressure > 160  LORazepam   Injectable 2 milliGRAM(s) IV Push every 6 hours PRN Agitation  morphine  - Injectable 2 milliGRAM(s) IV Push every 4 hours PRN Severe Pain (7 - 10)      LABS:                          13.6   8.50  )-----------( Clumped    ( 29 Mar 2020 07:28 )             44.1     03-29    141  |  110<H>  |  22.0<H>  ----------------------------<  109<H>  5.2   |  18.0<L>  |  0.92    Ca    8.3<L>      29 Mar 2020 07:28  Phos  3.5     03-29  Mg     2.5     03-29    TPro  6.7  /  Alb  2.1<L>  /  TBili  0.7  /  DBili  x   /  AST  208<H>  /  ALT  167<H>  /  AlkPhos  87  03-29    LIVER FUNCTIONS - ( 29 Mar 2020 07:28 )  Alb: 2.1 g/dL / Pro: 6.7 g/dL / ALK PHOS: 87 U/L / ALT: 167 U/L / AST: 208 U/L / GGT: x               RADIOLOGY & ADDITIONAL STUDIES:      < from: MR Head No Cont (03.28.20 @ 14:52) >  Mild periventricular white matter ischemia.  Multiple extensive mucosal thickening within the BILATERAL ethmoid sinuses and minimally within the maxillary and LEFT frontal sinuses. Secretions are seen within the LEFT maxillary and RIGHT frontal sinuses.      EEG 3/22/20  EEG SUMMARY/CLASSIFICATION  Abnormal EEG in an altered patient.  - Moderate to severe generalized slowing.  _____________________________________________________________  EEG IMPRESSION/CLINICAL CORRELATE  Abnormal EEG study.  1. Moderate to severe nonspecific diffuse or multifocal cerebral dysfunction.   2. No epileptiform pattern or seizure seen.

## 2020-03-30 NOTE — PROGRESS NOTE ADULT - SUBJECTIVE AND OBJECTIVE BOX
SAGE CAMARA    [id number]    70y      Male    Patient is a 70y old  Male who presents with a chief complaint of respiratory failure, seizure, aspiration pna (30 Mar 2020 11:28)      INTERVAL HPI/OVERNIGHT EVENTS:    Patient is doing ok, more alert, no sob, he is saturating well on RA, Pt has no fever, chills, chest pain.      REVIEW OF SYSTEMS:    CONSTITUTIONAL: No fever, some fatigue  RESPIRATORY: No cough, improving shortness of breath  CARDIOVASCULAR: No chest pain, palpitations  GASTROINTESTINAL: No abdominal, No nausea, vomiting  NEUROLOGICAL: No headaches,  loss of strength.  MISCELLANEOUS: No joint swelling or pain        Vital Signs Last 24 Hrs  T(C): 36.7 (30 Mar 2020 07:49), Max: 36.8 (29 Mar 2020 17:14)  T(F): 98.1 (30 Mar 2020 07:49), Max: 98.3 (29 Mar 2020 17:14)  HR: 93 (30 Mar 2020 11:35) (78 - 93)  BP: 156/92 (30 Mar 2020 11:35) (153/82 - 169/90)  RR: 18 (30 Mar 2020 08:27) (18 - 20)  SpO2: 100% (30 Mar 2020 08:27) (92% - 100%)    PHYSICAL EXAM:    GENERAL: Elderly male looking comfortable  HEENT: PERRL, has NG tube in  NECK: soft, Supple, No JVD,   CHEST/LUNG: Decrease air entry bilaterally; No wheezing, no more crackles   HEART: S1S2+, Regular rate and rhythm; No murmurs  ABDOMEN: Soft, Nontender, Nondistended; Bowel sounds present  EXTREMITIES:  1+ Peripheral Pulses, No edema, left arm swelling is much better, left should unable to move because of pain, has left should sling on  SKIN: No rashes or lesions  NEURO: AOX3, he is able to move his all extremities except left arm because of pain, no facial asymmetry   PSYCH: normal mood    LABS:                        13.6   8.50  )-----------( Clumped    ( 29 Mar 2020 07:28 )             44.1     03-29    141  |  110<H>  |  22.0<H>  ----------------------------<  109<H>  5.2   |  18.0<L>  |  0.92    Ca    8.3<L>      29 Mar 2020 07:28  Phos  3.5     03-29  Mg     2.5     03-29    TPro  6.7  /  Alb  2.1<L>  /  TBili  0.7  /  DBili  x   /  AST  208<H>  /  ALT  167<H>  /  AlkPhos  87  03-29            I&O's Summary    29 Mar 2020 07:01  -  30 Mar 2020 07:00  --------------------------------------------------------  IN: 480 mL / OUT: 700 mL / NET: -220 mL    30 Mar 2020 07:01  -  30 Mar 2020 15:46  --------------------------------------------------------  IN: 0 mL / OUT: 400 mL / NET: -400 mL        MEDICATIONS  (STANDING):  ascorbic acid 500 milliGRAM(s) Oral daily  chlorhexidine 0.12% Liquid 15 milliLiter(s) Oral Mucosa two times a day  chlorhexidine 4% Liquid 1 Application(s) Topical <User Schedule>  enoxaparin Injectable 40 milliGRAM(s) SubCutaneous daily  folic acid 1 milliGRAM(s) Oral daily  metoprolol tartrate 50 milliGRAM(s) Oral two times a day  multivitamin 1 Tablet(s) Oral daily  OLANZapine Disintegrating Tablet 5 milliGRAM(s) Oral two times a day  valproate sodium IVPB 500 milliGRAM(s) IV Intermittent every 8 hours    MEDICATIONS  (PRN):  acetaminophen    Suspension .. 650 milliGRAM(s) Oral every 6 hours PRN Temp greater or equal to 38.5C (101.3F)  labetalol Injectable 10 milliGRAM(s) IV Push every 4 hours PRN Systolic blood pressure > 160  LORazepam   Injectable 2 milliGRAM(s) IV Push every 6 hours PRN Agitation  morphine  - Injectable 2 milliGRAM(s) IV Push every 4 hours PRN Severe Pain (7 - 10) SAGE CAMARA    [id number]    70y      Male    Patient is a 70y old  Male who presents with a chief complaint of respiratory failure, seizure, aspiration pna (30 Mar 2020 11:28)      INTERVAL HPI/OVERNIGHT EVENTS:    Patient is doing ok, more alert, no sob, he is saturating well on RA, Pt has no fever, chills, chest pain.      REVIEW OF SYSTEMS:    CONSTITUTIONAL: No fever, some fatigue  RESPIRATORY: No cough, improving shortness of breath  CARDIOVASCULAR: No chest pain, palpitations  GASTROINTESTINAL: No abdominal, No nausea, vomiting  NEUROLOGICAL: No headaches,  loss of strength.  MISCELLANEOUS: No joint swelling or pain        Vital Signs Last 24 Hrs  T(C): 36.7 (30 Mar 2020 07:49), Max: 36.8 (29 Mar 2020 17:14)  T(F): 98.1 (30 Mar 2020 07:49), Max: 98.3 (29 Mar 2020 17:14)  HR: 93 (30 Mar 2020 11:35) (78 - 93)  BP: 156/92 (30 Mar 2020 11:35) (153/82 - 169/90)  RR: 18 (30 Mar 2020 08:27) (18 - 20)  SpO2: 100% (30 Mar 2020 08:27) (92% - 100%)    PHYSICAL EXAM:    GENERAL: Elderly male looking comfortable  HEENT: PERRL,  NECK: soft, Supple, No JVD,   CHEST/LUNG: Decrease air entry bilaterally; No wheezing, no more crackles   HEART: S1S2+, Regular rate and rhythm; No murmurs  ABDOMEN: Soft, Nontender, Nondistended; Bowel sounds present  EXTREMITIES:  1+ Peripheral Pulses, No edema, left arm swelling is much better, left should unable to move because of pain, has left should sling on  SKIN: No rashes or lesions  NEURO: AOX3, he is able to move his all extremities except left arm because of pain, no facial asymmetry   PSYCH: normal mood    LABS:                        13.6   8.50  )-----------( Clumped    ( 29 Mar 2020 07:28 )             44.1     03-29    141  |  110<H>  |  22.0<H>  ----------------------------<  109<H>  5.2   |  18.0<L>  |  0.92    Ca    8.3<L>      29 Mar 2020 07:28  Phos  3.5     03-29  Mg     2.5     03-29    TPro  6.7  /  Alb  2.1<L>  /  TBili  0.7  /  DBili  x   /  AST  208<H>  /  ALT  167<H>  /  AlkPhos  87  03-29            I&O's Summary    29 Mar 2020 07:01  -  30 Mar 2020 07:00  --------------------------------------------------------  IN: 480 mL / OUT: 700 mL / NET: -220 mL    30 Mar 2020 07:01  -  30 Mar 2020 15:46  --------------------------------------------------------  IN: 0 mL / OUT: 400 mL / NET: -400 mL        MEDICATIONS  (STANDING):  ascorbic acid 500 milliGRAM(s) Oral daily  chlorhexidine 0.12% Liquid 15 milliLiter(s) Oral Mucosa two times a day  chlorhexidine 4% Liquid 1 Application(s) Topical <User Schedule>  enoxaparin Injectable 40 milliGRAM(s) SubCutaneous daily  folic acid 1 milliGRAM(s) Oral daily  metoprolol tartrate 50 milliGRAM(s) Oral two times a day  multivitamin 1 Tablet(s) Oral daily  OLANZapine Disintegrating Tablet 5 milliGRAM(s) Oral two times a day  valproate sodium IVPB 500 milliGRAM(s) IV Intermittent every 8 hours    MEDICATIONS  (PRN):  acetaminophen    Suspension .. 650 milliGRAM(s) Oral every 6 hours PRN Temp greater or equal to 38.5C (101.3F)  labetalol Injectable 10 milliGRAM(s) IV Push every 4 hours PRN Systolic blood pressure > 160  LORazepam   Injectable 2 milliGRAM(s) IV Push every 6 hours PRN Agitation  morphine  - Injectable 2 milliGRAM(s) IV Push every 4 hours PRN Severe Pain (7 - 10)

## 2020-03-31 ENCOUNTER — TRANSCRIPTION ENCOUNTER (OUTPATIENT)
Age: 70
End: 2020-03-31

## 2020-03-31 PROCEDURE — 99232 SBSQ HOSP IP/OBS MODERATE 35: CPT

## 2020-03-31 PROCEDURE — 99232 SBSQ HOSP IP/OBS MODERATE 35: CPT | Mod: 57

## 2020-03-31 RX ORDER — OXYCODONE AND ACETAMINOPHEN 5; 325 MG/1; MG/1
1 TABLET ORAL ONCE
Refills: 0 | Status: DISCONTINUED | OUTPATIENT
Start: 2020-03-31 | End: 2020-03-31

## 2020-03-31 RX ADMIN — OXYCODONE AND ACETAMINOPHEN 1 TABLET(S): 5; 325 TABLET ORAL at 05:11

## 2020-03-31 RX ADMIN — Medication 1 MILLIGRAM(S): at 13:00

## 2020-03-31 RX ADMIN — ENOXAPARIN SODIUM 40 MILLIGRAM(S): 100 INJECTION SUBCUTANEOUS at 22:03

## 2020-03-31 RX ADMIN — Medication 27.5 MILLIGRAM(S): at 14:37

## 2020-03-31 RX ADMIN — Medication 1 TABLET(S): at 13:00

## 2020-03-31 RX ADMIN — Medication 27.5 MILLIGRAM(S): at 22:02

## 2020-03-31 RX ADMIN — Medication 27.5 MILLIGRAM(S): at 04:38

## 2020-03-31 RX ADMIN — Medication 500 MILLIGRAM(S): at 13:00

## 2020-03-31 RX ADMIN — Medication 10 MILLIGRAM(S): at 20:30

## 2020-03-31 RX ADMIN — MORPHINE SULFATE 2 MILLIGRAM(S): 50 CAPSULE, EXTENDED RELEASE ORAL at 19:39

## 2020-03-31 RX ADMIN — Medication 50 MILLIGRAM(S): at 04:38

## 2020-03-31 RX ADMIN — OLANZAPINE 5 MILLIGRAM(S): 15 TABLET, FILM COATED ORAL at 22:02

## 2020-03-31 RX ADMIN — OLANZAPINE 5 MILLIGRAM(S): 15 TABLET, FILM COATED ORAL at 13:01

## 2020-03-31 RX ADMIN — Medication 50 MILLIGRAM(S): at 17:35

## 2020-03-31 NOTE — DISCHARGE NOTE PROVIDER - NSDCCPCAREPLAN_GEN_ALL_CORE_FT
PRINCIPAL DISCHARGE DIAGNOSIS  Diagnosis: Seizure  Assessment and Plan of Treatment: Continue current medications as prescribed.  Follow up with primary doctor and neurology.      SECONDARY DISCHARGE DIAGNOSES  Diagnosis: COVID-19  Assessment and Plan of Treatment: Follow up with primary doctor.  Quarantine protocol.    Diagnosis: Acute respiratory failure with hypoxia  Assessment and Plan of Treatment: Follow up with primary doctor.  Quarantine protocol.    Diagnosis: Acute pain of left shoulder  Assessment and Plan of Treatment: Follow up with ortho.    Diagnosis: Aspiration pneumonia of both lower lobes, unspecified aspiration pneumonia type  Assessment and Plan of Treatment: Follow up with primary doctor.

## 2020-03-31 NOTE — PHYSICAL THERAPY INITIAL EVALUATION ADULT - ADDITIONAL COMMENTS
Pt is a questionable historian, Pt lives in a private home with his wife. 4 steps to enter with handrails, no steps inside. Pt was independent PTA without assist device. Pt does not own DME.

## 2020-03-31 NOTE — PHYSICAL THERAPY INITIAL EVALUATION ADULT - IMPAIRED TRANSFERS: SIT/STAND, REHAB EVAL
pain/impaired postural control/decreased ROM/impaired balance/impaired coordination/decreased strength/Pt required verbal cues to maintain NWB on LUE. Pt unable to achieve full standing position despite Max A from PT and right knee blocked

## 2020-03-31 NOTE — PHYSICAL THERAPY INITIAL EVALUATION ADULT - RANGE OF MOTION EXAMINATION, REHAB EVAL
Right UE ROM was WFL (within functional limits)/bilateral lower extremity ROM was WFL (within functional limits)/LUE not tested

## 2020-03-31 NOTE — PHYSICAL THERAPY INITIAL EVALUATION ADULT - IMPAIRMENTS CONTRIBUTING IMPAIRED BED MOBILITY, REHAB EVAL
decreased ROM/decreased strength/pain/impaired postural control/impaired coordination/impaired balance

## 2020-03-31 NOTE — DISCHARGE NOTE PROVIDER - CARE PROVIDER_API CALL
Bg Barr (DO)  [facility]  [address]  [place] [address]  Phone: [phone]  Fax: [phone]  Follow Up Time: Bg Barr (DO)  [address]  [place] [address]  Phone: [phone]  Fax: [phone]  Follow Up Time:     Edwin Mendiola)  Neurology  [address] [place] [address]  Phone: [phone]  Fax: [phone]  Follow Up Time:

## 2020-03-31 NOTE — PHYSICAL THERAPY INITIAL EVALUATION ADULT - IMPAIRMENTS FOUND, PT EVAL
muscle strength/ROM/poor safety awareness/aerobic capacity/endurance/gait, locomotion, and balance/arousal, attention, and cognition

## 2020-03-31 NOTE — DISCHARGE NOTE PROVIDER - HOSPITAL COURSE
70 yr male presents with Generalized weakness and AMS and was noted to have with fever, seizure, encephalopathy and resp failure secondary to COVID 19. In the ED, pt with witnessed seizure complicated by aspiration prompting intubation, patient was managed in the ICU, antibiotics, vent support and Seizure meds, later on extubated and was transferred out of the ICU, his respiratory failure resolved and continued to have AMS and s/p LP and CSF fluid was unremarkable, patient was seen and followed by Neurology, and was continued with Depakote, MRI brain is unremarkable, No further seizure activity during rest of the course of hospitalization, as per neuro follow up with them in the office as out patient, his Encephalopathy was likely secondary to seziure/infection/metabolic derangements now resolved, he has been AOX3, he was seen and followed by Speech and swallow team and has been given diet as recommended.          Patient was noted to have left arm swelling, US of the arm done showed No evidence of left upper extremity deep venous thrombosis. The left cephalic vein and left ulnar vein are not visualized, There is a complex fluid collection medial aspect of the left shoulder measuring 3.7 x 1.6 x 2.0 cm, patient was unable to move his shoulder, xray and CT showing displaced left proximal humeral neck fracture, patient was seen and followed by Ortho, Recommended arm Sling, Finger ROM to help resolution of forearm and hand edema, F/U with Dr Barr as outpt     Patient left arm swelling improved significantly and his pain was managed with pain meds.     Patient was seen by PT / PM&R complete, plan for acute rehab. PT IS MEDICALLY STABLE FOR DISCHARGE. DISCUSSED WITH [facility], PT REQUIRES NEGATIVE COVID TEST AND TO BE AFEBRILE FOR 72 HOURS. Repeat COVID testing positive, no new fevers since 4/1.  Patient to go to [facility] today.             Vital Signs Last 24 Hrs    T(C): 36.6 (07 Apr 2020 07:13), Max: 36.7 (06 Apr 2020 17:01)    T(F): 97.8 (07 Apr 2020 07:13), Max: 98 (06 Apr 2020 17:01)    HR: 102 (07 Apr 2020 07:13) (88 - 102)    BP: 132/82 (07 Apr 2020 07:13) (132/82 - 151/76)    BP(mean): --    RR: 19 (07 Apr 2020 07:13) (19 - 19)    SpO2: 100% (06 Apr 2020 21:49) (100% - 100%)        PHYSICAL EXAM:    GENERAL: NAD    HEAD:  Atraumatic, Normocephalic    NECK: Supple, No JVD, Normal thyroid    NERVOUS SYSTEM:  Alert, Good concentration; generalized weakness    CHEST/LUNG: Clear to auscultation bilaterally; No rales, rhonchi, wheezing, or rubs    HEART: Regular rate and rhythm; No murmurs, rubs, or gallops    ABDOMEN: Soft, Nontender, Nondistended; Bowel sounds present    EXTREMITIES:  2+ Peripheral Pulses, No clubbing, cyanosis, or edema

## 2020-03-31 NOTE — PROGRESS NOTE ADULT - ATTENDING COMMENTS
Ortho Trauma Attending:  Agree with above PA note.  Note edited where necessary.    Patient chart reviewed. Images reviewed and conversation was had with the family (Julita- Spouse).     The patient has sustained a comminuted fracture/dislocation of the left proximal humerus. It appears the proximal humerus is locked as a posterior dislocation. After a thorough discussion of risks and benefits of surgical intervention. At this time closed reduction would likely be unsuccessful and may lead to increased comminution and displacement of fracture. Open treatment with allograft, ORIF or reverse shoulder replacement is indicated in this procedure. Patient is currently covid positive so acute intervention is not currently indicated. Risks significantly outweigh the benefits. He has significant increase risk of not being extubated after procedure. His catabolic state after the procedure may also lead to recurrence of encephalopathic or pulmonary symptoms. At this time, the family has decided to continue nonoperative care with sling immobiliozation. Patient will follow up in the office with me once resolution of symptoms and he is disease free. At that time we will schedule him for elective open reduction with likely reverse shoulder replacement      Bg Barr, DO  Orthopaedic Surgery

## 2020-03-31 NOTE — PROGRESS NOTE ADULT - SUBJECTIVE AND OBJECTIVE BOX
SAGE CAMARA    [id number]    70y      Male    Patient is a 70y old  Male who presents with a chief complaint of respiratory failure, seizure, aspiration pna (31 Mar 2020 13:03)      INTERVAL HPI/OVERNIGHT EVENTS:    Patient is doing ok, he has no complains, he ia AOX3, he is saturating well on RA, Pt has no fever, chills, chest pain.      REVIEW OF SYSTEMS:    CONSTITUTIONAL: No fever, some fatigue  RESPIRATORY: No cough, improving shortness of breath  CARDIOVASCULAR: No chest pain, palpitations  GASTROINTESTINAL: No abdominal, No nausea, vomiting  NEUROLOGICAL: No headaches,  loss of strength.  MISCELLANEOUS: No joint swelling or pain      Vital Signs Last 24 Hrs  T(C): 36.9 (31 Mar 2020 10:24), Max: 37.4 (30 Mar 2020 21:00)  T(F): 98.4 (31 Mar 2020 10:24), Max: 99.3 (30 Mar 2020 21:00)  HR: 88 (31 Mar 2020 10:24) (87 - 92)  BP: 151/92 (31 Mar 2020 10:24) (140/90 - 162/88)  RR: 16 (31 Mar 2020 10:24) (16 - 18)  SpO2: 96% (31 Mar 2020 10:24) (95% - 96%)    PHYSICAL EXAM:    GENERAL: Elderly male looking comfortable  HEENT: PERRL, has NG tube in  NECK: soft, Supple, No JVD,   CHEST/LUNG: Decrease air entry bilaterally; No wheezing, no more crackles   HEART: S1S2+, Regular rate and rhythm; No murmurs  ABDOMEN: Soft, Nontender, Nondistended; Bowel sounds present  EXTREMITIES:  1+ Peripheral Pulses, No edema, left arm swelling is much better, left should unable to move because of pain, has left should sling on  SKIN: No rashes or lesions  NEURO: AOX3, he is able to move his all extremities except left arm because of pain, no facial asymmetry   PSYCH: normal mood    LABS:                  I&O's Summary    30 Mar 2020 07:01  -  31 Mar 2020 07:00  --------------------------------------------------------  IN: 250 mL / OUT: 1100 mL / NET: -850 mL    31 Mar 2020 07:01  -  31 Mar 2020 16:45  --------------------------------------------------------  IN: 380 mL / OUT: 0 mL / NET: 380 mL        MEDICATIONS  (STANDING):  ascorbic acid 500 milliGRAM(s) Oral daily  chlorhexidine 0.12% Liquid 15 milliLiter(s) Oral Mucosa two times a day  chlorhexidine 4% Liquid 1 Application(s) Topical <User Schedule>  enoxaparin Injectable 40 milliGRAM(s) SubCutaneous daily  folic acid 1 milliGRAM(s) Oral daily  metoprolol tartrate 50 milliGRAM(s) Oral two times a day  multivitamin 1 Tablet(s) Oral daily  OLANZapine Disintegrating Tablet 5 milliGRAM(s) Oral two times a day  valproate sodium IVPB 500 milliGRAM(s) IV Intermittent every 8 hours    MEDICATIONS  (PRN):  acetaminophen    Suspension .. 650 milliGRAM(s) Oral every 6 hours PRN Temp greater or equal to 38.5C (101.3F)  labetalol Injectable 10 milliGRAM(s) IV Push every 4 hours PRN Systolic blood pressure > 160  LORazepam   Injectable 2 milliGRAM(s) IV Push every 6 hours PRN Agitation  morphine  - Injectable 2 milliGRAM(s) IV Push every 4 hours PRN Severe Pain (7 - 10) SAGE CAMARA    [id number]    70y      Male    Patient is a 70y old  Male who presents with a chief complaint of respiratory failure, seizure, aspiration pna (31 Mar 2020 13:03)      INTERVAL HPI/OVERNIGHT EVENTS:    Patient is doing ok, he has no complains, he ia AOX3, he is saturating well on RA, Pt has no fever, chills, chest pain.      REVIEW OF SYSTEMS:    CONSTITUTIONAL: No fever, some fatigue  RESPIRATORY: No cough, improving shortness of breath  CARDIOVASCULAR: No chest pain, palpitations  GASTROINTESTINAL: No abdominal, No nausea, vomiting  NEUROLOGICAL: No headaches,  loss of strength.  MISCELLANEOUS: No joint swelling or pain      Vital Signs Last 24 Hrs  T(C): 36.9 (31 Mar 2020 10:24), Max: 37.4 (30 Mar 2020 21:00)  T(F): 98.4 (31 Mar 2020 10:24), Max: 99.3 (30 Mar 2020 21:00)  HR: 88 (31 Mar 2020 10:24) (87 - 92)  BP: 151/92 (31 Mar 2020 10:24) (140/90 - 162/88)  RR: 16 (31 Mar 2020 10:24) (16 - 18)  SpO2: 96% (31 Mar 2020 10:24) (95% - 96%)    PHYSICAL EXAM:    GENERAL: Elderly male looking comfortable  HEENT: PERRL  NECK: soft, Supple, No JVD,   CHEST/LUNG: Decrease air entry bilaterally; No wheezing, no more crackles   HEART: S1S2+, Regular rate and rhythm; No murmurs  ABDOMEN: Soft, Nontender, Nondistended; Bowel sounds present  EXTREMITIES:  1+ Peripheral Pulses, No edema, left arm swelling is much better, left should unable to move because of pain, has left should sling on  SKIN: No rashes or lesions  NEURO: AOX3, he is able to move his all extremities except left arm because of pain, no facial asymmetry   PSYCH: normal mood    LABS:                  I&O's Summary    30 Mar 2020 07:01  -  31 Mar 2020 07:00  --------------------------------------------------------  IN: 250 mL / OUT: 1100 mL / NET: -850 mL    31 Mar 2020 07:01  -  31 Mar 2020 16:45  --------------------------------------------------------  IN: 380 mL / OUT: 0 mL / NET: 380 mL        MEDICATIONS  (STANDING):  ascorbic acid 500 milliGRAM(s) Oral daily  chlorhexidine 0.12% Liquid 15 milliLiter(s) Oral Mucosa two times a day  chlorhexidine 4% Liquid 1 Application(s) Topical <User Schedule>  enoxaparin Injectable 40 milliGRAM(s) SubCutaneous daily  folic acid 1 milliGRAM(s) Oral daily  metoprolol tartrate 50 milliGRAM(s) Oral two times a day  multivitamin 1 Tablet(s) Oral daily  OLANZapine Disintegrating Tablet 5 milliGRAM(s) Oral two times a day  valproate sodium IVPB 500 milliGRAM(s) IV Intermittent every 8 hours    MEDICATIONS  (PRN):  acetaminophen    Suspension .. 650 milliGRAM(s) Oral every 6 hours PRN Temp greater or equal to 38.5C (101.3F)  labetalol Injectable 10 milliGRAM(s) IV Push every 4 hours PRN Systolic blood pressure > 160  LORazepam   Injectable 2 milliGRAM(s) IV Push every 6 hours PRN Agitation  morphine  - Injectable 2 milliGRAM(s) IV Push every 4 hours PRN Severe Pain (7 - 10)

## 2020-03-31 NOTE — PHYSICAL THERAPY INITIAL EVALUATION ADULT - PERTINENT HX OF CURRENT PROBLEM, REHAB EVAL
70 yr male  with fever seizure confusion and encephalopathy with positive COVID-19.  Acute resp failure for COVID-19 resolved , extubated and tolerating oxygen via nasal cannula.

## 2020-03-31 NOTE — DISCHARGE NOTE PROVIDER - PROVIDER TOKENS
PROVIDER:[TOKEN:[[id number]]] PROVIDER:[TOKEN:[[id number]]],PROVIDER:[TOKEN:[[id number]]]

## 2020-03-31 NOTE — DISCHARGE NOTE PROVIDER - NSDCMRMEDTOKEN_GEN_ALL_CORE_FT
Aspir 81 oral delayed release tablet: 1 tab(s) orally once a day  Carafate 1 g oral tablet: 1 tab(s) orally once a day, As Needed  labetalol 200 mg oral tablet: 1 tab(s) orally 2 times a day  losartan 100 mg oral tablet: 1 tab(s) orally once a day  meclizine 25 mg oral tablet: 2 tab(s) orally 2 times a day x 5 days, As Needed - for dizziness  Norvasc 10 mg oral tablet: 1 tab(s) orally once a day acetaminophen 160 mg/5 mL oral suspension: 20.31 milliliter(s) orally every 6 hours, As needed, Temp greater or equal to 38.5C (101.3F), Mild Pain (1 - 3)  ascorbic acid 500 mg oral tablet: 1 tab(s) orally once a day  Carafate 1 g oral tablet: 1 tab(s) orally once a day, As Needed  divalproex sodium 250 mg oral delayed release tablet: 1 tab(s) orally 3 times a day  folic acid 1 mg oral tablet: 1 tab(s) orally once a day  metoprolol tartrate 50 mg oral tablet: 1 tab(s) orally 2 times a day  Multiple Vitamins oral tablet: 1 tab(s) orally once a day  OLANZapine 5 mg oral tablet, disintegratin tab(s) orally 2 times a day

## 2020-03-31 NOTE — DISCHARGE NOTE PROVIDER - CARE PROVIDERS DIRECT ADDRESSES
[email or web address]

## 2020-03-31 NOTE — PROGRESS NOTE ADULT - ASSESSMENT
70 yr male  with fever seizure confusion and encephalopathy with positive COVID-19.  Acute resp failure for COVID-19 resolved , extubed and tolerating oxygen via nasal cannula.      Problem/Plan - 1:  ·  Problem: Pneumonia, viral.  Plan: Present on admission, CXR showing bilateral opacity, Likely secondary to COVID-19, completed Zithromax. he is saturating well, weaned off from supplemental oxygen, no SOB any more     Problem/Plan - 2:  ·  Problem: Acute respiratory failure with hypoxia.  Plan: s/p extubated day 8, now weaned off from supplement oxygen, saturating well on RA, his respiratory failure has been resolved       Problem/Plan - 3:  ·  Problem: Seizure.  Plan: Unclear etiology, CSF studies is non specific, Vancyclovir is discontinued   Depakote 500mg iv q8, EEG show no epileptiform pattern, seen and followed by Neurology, MRI brain is unremarkable, seizure protocol.   as per Neuro continue with same dose of depakot and follow up with Neurology as out patient.      Problem/Plan - 4:  ·  Problem: Essential hypertension.  Plan: Metoprolol 50mg po bid.      Problem/Plan - 5:  Problem: Encephalopathy acute, likely acute metabolic encephalopathy,   Plan: Ativan. Zyprexa, Thiamine   Depakote, looks like improving, speech and swallow team is following has been started diet as recommended,   NG d/peter. Supportive care, he is now AOX3, encephalopathy has been resolved, MRI done showed Mild periventricular white matter ischemia, his metabolic encephalopathy resolved     Problem/Plan - 6:  Problem: Acute pain of left shoulder. Plan: Shoulder xray showing minimally displaced left proximal humeral neck fracture,  Seen by Ortho recommended. Arm Sling, Finger ROM to help resolution of forearm and hand edema.  CT of left shoulder done showed Redemonstrated comminuted fracture of the proximal left humerus involving the anterior medial aspect of the humeral head and lesser tuberosity. Persistent posterior dislocation of the humeral head. Fracture of the posterior aspect of the glenoid, Os acromiale. Subtle linear lucencies in the acromion are equivocal for normal variant versus nondisplaced fracture, seen by Orth as per them Pt will need surgical intervention when stable for definitive treatment. F/U with Dr Barr as outpt, Continue sling      Disp:  PT eval done and recommended FRANKLYN,  consulted for placement

## 2020-03-31 NOTE — PROGRESS NOTE ADULT - SUBJECTIVE AND OBJECTIVE BOX
MRN-[id number]    HOA SAGE    Addendum:  < from: CT Shoulder No Cont, Left (03.30.20 @ 12:36) >   EXAM:  CT SHOULDER ONLY LT                          PROCEDURE DATE:  03/30/2020          INTERPRETATION:    CT of the left shoulder without contrast.    CLINICAL INFORMATION: Left shoulder Pain and swelling. Seizure.  TECHNIQUE: Axial CT images were obtained of the left shoulder with coronal and sagittal reconstructions. No contrast was administered. Three dimensional reconstructions were obtained on an independent workstation.  COMPARISONS:  Left shoulder radiographs 3/26/2020, left humerus radiographs 3/25/2020.    FINDINGS:    Redemonstrated comminuted fracture of the proximal left humerus involving the anterior medial aspect of the humeral head and lesser tuberosity. Anteriorly displaced lesser tuberosity fracture fragment measuring upto 2.2 cm in axial plane and 3.4 cm in coronal plane. Fracture fragment adjacent to the posterior glenoid measuring up to 8 mm in axial plane, likely arising from the posterior glenoid. There is persistent posterior dislocation of the humeral head relative to the glenoid. There is an os acromiale. Subtle linear lucencies in the acromion are equivocal for normal variant versus nondisplaced fracture. Soft tissue swelling about the shoulder. There is lipohemarthrosis involving the glenohumeral joint. Limited evaluation for rotator cuff tear on CT. 3-dimensional reformatted images redemonstrate the above osseous findings.    Limited images of the left lung demonstrate scattered nonspecific opacities.    IMPRESSION:    Redemonstrated comminuted fracture of the proximal left humerus involving the anterior medial aspect of the humeral head and lesser tuberosity. Persistent posterior dislocation of the humeral head. Fracture of the posterior aspect of the glenoid.    Os acromiale. Subtle linear lucencies in the acromion are equivocal for normal variant versus nondisplaced fracture.     Scattered nonspecific opacities in the left lung, evaluation limited. Correlate clinically for possible infection.                MENDEL WHITTINGTON M.D., ATTENDINGRADIOLOGIST  This document has been electronically signed. Mar 30 2020  1:49PM    < end of copied text >    CT scan reviewed with Dr Barr  Pt will need surgical intervention when stable for definitive treatment. F/U with Dr Barr as outpt.  Continue sling

## 2020-04-01 PROCEDURE — 99232 SBSQ HOSP IP/OBS MODERATE 35: CPT

## 2020-04-01 PROCEDURE — 23600 CLTX PROX HUMRL FX W/O MNPJ: CPT | Mod: LT

## 2020-04-01 RX ADMIN — Medication 27.5 MILLIGRAM(S): at 18:03

## 2020-04-01 RX ADMIN — OLANZAPINE 5 MILLIGRAM(S): 15 TABLET, FILM COATED ORAL at 11:46

## 2020-04-01 RX ADMIN — Medication 500 MILLIGRAM(S): at 11:46

## 2020-04-01 RX ADMIN — Medication 650 MILLIGRAM(S): at 14:38

## 2020-04-01 RX ADMIN — OLANZAPINE 5 MILLIGRAM(S): 15 TABLET, FILM COATED ORAL at 18:03

## 2020-04-01 RX ADMIN — Medication 1 TABLET(S): at 11:46

## 2020-04-01 RX ADMIN — ENOXAPARIN SODIUM 40 MILLIGRAM(S): 100 INJECTION SUBCUTANEOUS at 22:26

## 2020-04-01 RX ADMIN — Medication 27.5 MILLIGRAM(S): at 05:30

## 2020-04-01 RX ADMIN — Medication 1 MILLIGRAM(S): at 11:46

## 2020-04-01 RX ADMIN — CHLORHEXIDINE GLUCONATE 1 APPLICATION(S): 213 SOLUTION TOPICAL at 07:18

## 2020-04-01 RX ADMIN — Medication 50 MILLIGRAM(S): at 18:03

## 2020-04-01 RX ADMIN — Medication 50 MILLIGRAM(S): at 05:30

## 2020-04-01 NOTE — PROGRESS NOTE ADULT - ASSESSMENT
70 yr male  with fever seizure confusion and encephalopathy with positive COVID-19.  Acute resp failure for COVID-19 resolved , extubed and tolerating oxygen via nasal cannula.      Problem/Plan - 1:  ·  Problem: Pneumonia, viral.  Plan: Present on admission, CXR showing bilateral opacity, Likely secondary to COVID-19, completed Zithromax. he is saturating well, weaned off from supplemental oxygen, no SOB any more, looks like Pneumonia resolved     Problem/Plan - 2:  ·  Problem: Acute respiratory failure with hypoxia.  Plan: s/p extubated day 8, now weaned off from supplement oxygen, saturating well on RA, his respiratory failure has been resolved       Problem/Plan - 3:  ·  Problem: Seizure.  Plan: Unclear etiology, CSF studies is non specific, Vancyclovir is discontinued   Depakote 500mg iv q8, EEG show no epileptiform pattern, seen and followed by Neurology, MRI brain is unremarkable, seizure protocol.   as per Neuro continue with same dose of depakot and follow up with Neurology as out patient.      Problem/Plan - 4:  ·  Problem: Essential hypertension.  Plan: Metoprolol 50mg po bid.      Problem/Plan - 5:  Problem: Encephalopathy acute, likely acute metabolic encephalopathy,   Plan: Ativan. Zyprexa, Thiamine   Depakote, looks like improving, speech and swallow team is following has been started diet as recommended,   NG d/peter. Supportive care, he is now AOX3, encephalopathy has been resolved, MRI done showed Mild periventricular white matter ischemia, his metabolic encephalopathy resolved.      Problem/Plan - 6:  Problem: Acute pain of left shoulder. Plan: Shoulder xray showing minimally displaced left proximal humeral neck fracture,  Seen by Ortho recommended. Arm Sling, Finger ROM to help resolution of forearm and hand edema.  CT of left shoulder done showed Redemonstrated comminuted fracture of the proximal left humerus involving the anterior medial aspect of the humeral head and lesser tuberosity. Persistent posterior dislocation of the humeral head. Fracture of the posterior aspect of the glenoid, Os acromiale. Subtle linear lucencies in the acromion are equivocal for normal variant versus nondisplaced fracture, seen by Orth as per them Pt will need surgical intervention when stable for definitive treatment. F/U with Dr Barr as outpt, Continue sling, not sure how patient shoulder fracture happened, it was found on CT scan, no fall or any injuries notified to me during his stay here.       Disp:  PT eval done and recommended FRANKLYN,  consulted for placement

## 2020-04-01 NOTE — PROGRESS NOTE ADULT - SUBJECTIVE AND OBJECTIVE BOX
SAGE CAMARA    [id number]    70y      Male    Patient is a 70y old  Male who presents with a chief complaint of respiratory failure, seizure, aspiration pna (31 Mar 2020 16:44)      INTERVAL HPI/OVERNIGHT EVENTS:    Patient is doing ok, he has no complains, he is AOX3, he is saturating well on RA, Pt has no fever, chills, chest pain, his gets left shoulder with movement     REVIEW OF SYSTEMS:    CONSTITUTIONAL: No fever, some fatigue  RESPIRATORY: No cough, improving shortness of breath  CARDIOVASCULAR: No chest pain, palpitations  GASTROINTESTINAL: No abdominal, No nausea, vomiting  NEUROLOGICAL: No headaches,  loss of strength.  MISCELLANEOUS: No joint swelling or pain        Vital Signs Last 24 Hrs  T(C): 39 (01 Apr 2020 14:00), Max: 39 (01 Apr 2020 14:00)  T(F): 102.2 (01 Apr 2020 14:00), Max: 102.2 (01 Apr 2020 14:00)  HR: 90 (01 Apr 2020 14:00) (84 - 93)  BP: 146/91 (01 Apr 2020 14:00) (146/91 - 178/96)  RR: 19 (01 Apr 2020 14:00) (18 - 20)  SpO2: 98% (01 Apr 2020 14:00) (94% - 98%)    PHYSICAL EXAM:  GENERAL: Elderly male looking comfortable  HEENT: PERRL  NECK: soft, Supple, No JVD,   CHEST/LUNG: Decrease air entry bilaterally; No wheezing, no more crackles   HEART: S1S2+, Regular rate and rhythm; No murmurs  ABDOMEN: Soft, Nontender, Nondistended; Bowel sounds present  EXTREMITIES:  1+ Peripheral Pulses, No edema, left arm swelling is much better, left should unable to move because of pain, has left should sling on  SKIN: No rashes or lesions  NEURO: AOX3, he is able to move his all extremities except left arm because of pain, no facial asymmetry   PSYCH: normal mood      LABS:                  I&O's Summary    31 Mar 2020 07:01  -  01 Apr 2020 07:00  --------------------------------------------------------  IN: 500 mL / OUT: 500 mL / NET: 0 mL        MEDICATIONS  (STANDING):  ascorbic acid 500 milliGRAM(s) Oral daily  chlorhexidine 0.12% Liquid 15 milliLiter(s) Oral Mucosa two times a day  chlorhexidine 4% Liquid 1 Application(s) Topical <User Schedule>  enoxaparin Injectable 40 milliGRAM(s) SubCutaneous daily  folic acid 1 milliGRAM(s) Oral daily  metoprolol tartrate 50 milliGRAM(s) Oral two times a day  multivitamin 1 Tablet(s) Oral daily  OLANZapine Disintegrating Tablet 5 milliGRAM(s) Oral two times a day  valproate sodium IVPB 500 milliGRAM(s) IV Intermittent every 8 hours    MEDICATIONS  (PRN):  acetaminophen    Suspension .. 650 milliGRAM(s) Oral every 6 hours PRN Temp greater or equal to 38.5C (101.3F)  labetalol Injectable 10 milliGRAM(s) IV Push every 4 hours PRN Systolic blood pressure > 160  LORazepam   Injectable 2 milliGRAM(s) IV Push every 6 hours PRN Agitation  morphine  - Injectable 2 milliGRAM(s) IV Push every 4 hours PRN Severe Pain (7 - 10)

## 2020-04-02 PROCEDURE — 99232 SBSQ HOSP IP/OBS MODERATE 35: CPT

## 2020-04-02 RX ADMIN — Medication 500 MILLIGRAM(S): at 11:08

## 2020-04-02 RX ADMIN — Medication 1 TABLET(S): at 11:08

## 2020-04-02 RX ADMIN — CHLORHEXIDINE GLUCONATE 15 MILLILITER(S): 213 SOLUTION TOPICAL at 21:00

## 2020-04-02 RX ADMIN — Medication 1 MILLIGRAM(S): at 11:08

## 2020-04-02 RX ADMIN — Medication 50 MILLIGRAM(S): at 05:43

## 2020-04-02 RX ADMIN — CHLORHEXIDINE GLUCONATE 15 MILLILITER(S): 213 SOLUTION TOPICAL at 05:43

## 2020-04-02 RX ADMIN — CHLORHEXIDINE GLUCONATE 1 APPLICATION(S): 213 SOLUTION TOPICAL at 05:43

## 2020-04-02 RX ADMIN — Medication 50 MILLIGRAM(S): at 17:17

## 2020-04-02 RX ADMIN — ENOXAPARIN SODIUM 40 MILLIGRAM(S): 100 INJECTION SUBCUTANEOUS at 21:01

## 2020-04-02 RX ADMIN — Medication 27.5 MILLIGRAM(S): at 23:00

## 2020-04-02 RX ADMIN — OLANZAPINE 5 MILLIGRAM(S): 15 TABLET, FILM COATED ORAL at 11:08

## 2020-04-02 RX ADMIN — Medication 27.5 MILLIGRAM(S): at 03:57

## 2020-04-02 RX ADMIN — Medication 27.5 MILLIGRAM(S): at 17:18

## 2020-04-02 RX ADMIN — Medication 27.5 MILLIGRAM(S): at 11:08

## 2020-04-02 RX ADMIN — OLANZAPINE 5 MILLIGRAM(S): 15 TABLET, FILM COATED ORAL at 21:04

## 2020-04-02 NOTE — PROGRESS NOTE ADULT - ASSESSMENT
70 yr male  with fever seizure confusion and encephalopathy with positive COVID-19.  Acute resp failure for COVID-19 resolved , extubed and tolerating oxygen via nasal cannula.      Problem/Plan - 1:  ·  Problem: Pneumonia, viral.  Plan: Present on admission, CXR showing bilateral opacity, Likely secondary to COVID-19, completed Zithromax. he is saturating well, weaned off from supplemental oxygen, no SOB any more, looks like Pneumonia resolved     Problem/Plan - 2:  ·  Problem: Acute respiratory failure with hypoxia.  Plan: s/p extubated day 8, now weaned off from supplement oxygen, saturating well on RA, his respiratory failure has been resolved       Problem/Plan - 3:  ·  Problem: Seizure.  Plan: Unclear etiology, CSF studies is non specific, Vancyclovir is discontinued   Depakote 500mg iv q8, EEG show no epileptiform pattern, seen and followed by Neurology, MRI brain is unremarkable, seizure protocol.   as per Neuro continue with same dose of depakot and follow up with Neurology as out patient.      Problem/Plan - 4:  ·  Problem: Essential hypertension.  Plan: Metoprolol 50mg po bid, BP has been stable      Problem/Plan - 5:  Problem: Encephalopathy acute, likely acute metabolic encephalopathy,   Plan: Ativan. Zyprexa, Thiamine   Depakote, looks like improving, speech and swallow team is following has been started diet as recommended,   NG d/peter. Supportive care, he is now AOX3, encephalopathy has been resolved, MRI done showed Mild periventricular white matter ischemia, his metabolic encephalopathy resolved.      Problem/Plan - 6:  Problem: Acute pain of left shoulder. Plan: Shoulder xray showing minimally displaced left proximal humeral neck fracture,  Seen by Ortho recommended. Arm Sling, Finger ROM to help resolution of forearm and hand edema.  CT of left shoulder done showed Redemonstrated comminuted fracture of the proximal left humerus involving the anterior medial aspect of the humeral head and lesser tuberosity. Persistent posterior dislocation of the humeral head. Fracture of the posterior aspect of the glenoid, Os acromiale. Subtle linear lucencies in the acromion are equivocal for normal variant versus nondisplaced fracture, seen by Orth as per them Pt will need surgical intervention when stable for definitive treatment. F/U with Dr Barr as outpt, Continue sling, not sure how patient shoulder fracture happened, it was found on CT scan, no fall or any injuries notified to me during his stay here.       Disp:  PT eval done and recommended FRANKLYN, family requested acute rehab eval, PM&R consult placed,  consulted for placement

## 2020-04-02 NOTE — PROGRESS NOTE ADULT - SUBJECTIVE AND OBJECTIVE BOX
SAGE CAMARA    [id number]    70y      Male    Patient is a 70y old  Male who presents with a chief complaint of respiratory failure, seizure, aspiration pna (01 Apr 2020 17:41)      INTERVAL HPI/OVERNIGHT EVENTS:    Patient is doing ok, no fever spike, no SOB, he is AOX3, he is saturating well on RA, Pt has no fever, chills, chest pain.      REVIEW OF SYSTEMS:    CONSTITUTIONAL: No fever, some fatigue  RESPIRATORY: No cough, improving shortness of breath  CARDIOVASCULAR: No chest pain, palpitations  GASTROINTESTINAL: No abdominal, No nausea, vomiting  NEUROLOGICAL: No headaches,  loss of strength.  MISCELLANEOUS: No joint swelling or pain      Vital Signs Last 24 Hrs  T(C): 36.4 (02 Apr 2020 10:35), Max: 39 (01 Apr 2020 14:00)  T(F): 97.5 (02 Apr 2020 10:35), Max: 102.2 (01 Apr 2020 14:00)  HR: 86 (02 Apr 2020 10:35) (79 - 90)  BP: 152/79 (02 Apr 2020 10:35) (146/86 - 161/90  RR: 18 (02 Apr 2020 10:35) (16 - 19)  SpO2: 93% (02 Apr 2020 10:35) (92% - 98%)    PHYSICAL EXAM:    GENERAL: Elderly male looking comfortable  HEENT: PERRL  NECK: soft, Supple, No JVD,   CHEST/LUNG: Decrease air entry bilaterally; No wheezing, no more crackles   HEART: S1S2+, Regular rate and rhythm; No murmurs  ABDOMEN: Soft, Nontender, Nondistended; Bowel sounds present  EXTREMITIES:  1+ Peripheral Pulses, No edema, left arm swelling is much better, left should unable to move because of pain, has left should sling on  SKIN: No rashes or lesions  NEURO: AOX3, he is able to move his all extremities except left arm because of pain, no facial asymmetry   PSYCH: normal mood    LABS:                  I&O's Summary    01 Apr 2020 07:01  -  02 Apr 2020 07:00  --------------------------------------------------------  IN: 240 mL / OUT: 750 mL / NET: -510 mL        MEDICATIONS  (STANDING):  ascorbic acid 500 milliGRAM(s) Oral daily  chlorhexidine 0.12% Liquid 15 milliLiter(s) Oral Mucosa two times a day  chlorhexidine 4% Liquid 1 Application(s) Topical <User Schedule>  enoxaparin Injectable 40 milliGRAM(s) SubCutaneous daily  folic acid 1 milliGRAM(s) Oral daily  metoprolol tartrate 50 milliGRAM(s) Oral two times a day  multivitamin 1 Tablet(s) Oral daily  OLANZapine Disintegrating Tablet 5 milliGRAM(s) Oral two times a day  valproate sodium IVPB 500 milliGRAM(s) IV Intermittent every 8 hours    MEDICATIONS  (PRN):  acetaminophen    Suspension .. 650 milliGRAM(s) Oral every 6 hours PRN Temp greater or equal to 38.5C (101.3F)  labetalol Injectable 10 milliGRAM(s) IV Push every 4 hours PRN Systolic blood pressure > 160

## 2020-04-03 PROCEDURE — 99223 1ST HOSP IP/OBS HIGH 75: CPT

## 2020-04-03 PROCEDURE — 99232 SBSQ HOSP IP/OBS MODERATE 35: CPT

## 2020-04-03 RX ADMIN — Medication 27.5 MILLIGRAM(S): at 23:30

## 2020-04-03 RX ADMIN — CHLORHEXIDINE GLUCONATE 15 MILLILITER(S): 213 SOLUTION TOPICAL at 05:12

## 2020-04-03 RX ADMIN — Medication 50 MILLIGRAM(S): at 05:12

## 2020-04-03 RX ADMIN — Medication 27.5 MILLIGRAM(S): at 11:24

## 2020-04-03 RX ADMIN — OLANZAPINE 5 MILLIGRAM(S): 15 TABLET, FILM COATED ORAL at 23:30

## 2020-04-03 RX ADMIN — Medication 500 MILLIGRAM(S): at 11:22

## 2020-04-03 RX ADMIN — Medication 50 MILLIGRAM(S): at 16:50

## 2020-04-03 RX ADMIN — ENOXAPARIN SODIUM 40 MILLIGRAM(S): 100 INJECTION SUBCUTANEOUS at 23:30

## 2020-04-03 RX ADMIN — Medication 650 MILLIGRAM(S): at 18:23

## 2020-04-03 RX ADMIN — CHLORHEXIDINE GLUCONATE 15 MILLILITER(S): 213 SOLUTION TOPICAL at 23:30

## 2020-04-03 RX ADMIN — CHLORHEXIDINE GLUCONATE 1 APPLICATION(S): 213 SOLUTION TOPICAL at 10:54

## 2020-04-03 RX ADMIN — OLANZAPINE 5 MILLIGRAM(S): 15 TABLET, FILM COATED ORAL at 11:24

## 2020-04-03 RX ADMIN — Medication 1 MILLIGRAM(S): at 11:22

## 2020-04-03 RX ADMIN — Medication 27.5 MILLIGRAM(S): at 05:12

## 2020-04-03 RX ADMIN — Medication 1 TABLET(S): at 11:23

## 2020-04-03 NOTE — OCCUPATIONAL THERAPY INITIAL EVALUATION ADULT - LIVES WITH, PROFILE
Pt lives with wife in a private home. 5 ARA no hand rail, no steps inside the home. Pt states wife can assist/spouse

## 2020-04-03 NOTE — CONSULT NOTE ADULT - SUBJECTIVE AND OBJECTIVE BOX
70yM was admitted on 03-20 after returning from yesterday [place] with dizziness. He returned with witnessed seizure and hypoxia/respiratory failure and aspiration. He received Ativan IV and is s/p emergent intubation. He tested +COVID.    Head CT - no acute findings.   CHEST CT - Limited, motion study.Bibasilar airspace consolidations, which may reflect pneumonia in the appropriate clinical setting.Trace pleural effusions. Fracture proximal left humerus, recommend clinical correlation and dedicated left shoulder/humerus radiographs.  LEFT SHOULDER CT 3/30 - Redemonstrated comminuted fracture of the proximal left humerus involving the anterior medial aspect of the humeral head and lesser tuberosity. Persistent posterior dislocation of the humeral head. Fracture of the posterior aspect of the glenoid. Os acromiale. Subtle linear lucencies in the acromion are equivocal for normal variant versus nondisplaced fracture. Scattered nonspecific opacities in the left lung, evaluation limited. Correlate clinically for possible infection.    Patient is s/p Zithromax, extubated 3/24 and has weaned off O2.   Patient was maintained on Depakote for seizure PPX.   Patient was started on Zyprexa for delirium/encephalopathy.  Patient was treated conservatively for left humeral fracture with sling.     Patient reports pain in his left shoulder.   Limited verbalizations.   Follows commands, slowed.       REVIEW OF SYSTEMS  Constitutional - No fever, No weight loss, +fatigue  HEENT - No eye pain, No visual disturbances, No difficulty hearing, No tinnitus, No vertigo, No neck pain  Respiratory - No cough, No wheezing, No shortness of breath  Cardiovascular - No chest pain, No palpitations  Gastrointestinal - No abdominal pain, No nausea, No vomiting, No diarrhea, No constipation  Genitourinary - No dysuria, No frequency, No hematuria, No incontinence  Neurological - No headaches, +memory loss, +loss of strength, No numbness, No tremors  Skin - No itching, No rashes, No lesions   Endocrine - No temperature intolerance  Musculoskeletal - +joint pain, No joint swelling, +muscle pain  Psychiatric - No depression, No anxiety    VITALS  T(C): 36.5 (04-03-20 @ 05:06), Max: 37.7 (04-02-20 @ 20:50)  HR: 85 (04-03-20 @ 05:06) (79 - 88)  BP: 130/79 (04-03-20 @ 05:06) (130/68 - 160/96)  RR: 16 (04-03-20 @ 05:06) (16 - 20)  SpO2: 94% (04-03-20 @ 05:06) (91% - 95%)  Wt(kg): --    PAST MEDICAL & SURGICAL HISTORY  HTN (hypertension)  No significant past surgical history      SOCIAL HISTORY  Smoking - Denied  EtOH - Denied   Drugs - Denied    FUNCTIONAL HISTORY  Lives with spouse, 4 ARA  Independent    CURRENT FUNCTIONAL STATUS  3/31  Bed Mobility: Rolling/Turning:     · Level of [place]	moderate assist (50% patients effort)	  · Physical Assist/Nonphysical Assist	1 person assist	    Bed Mobility: Scooting/Bridging:     · Level of [place]	maximum assist (25% patients effort)	  · Physical Assist/Nonphysical Assist	1 person assist	    Bed Mobility: Sit to Supine:     · Level of [place]	dependent (less than 25% patients effort)	  · Physical Assist/Nonphysical Assist	1 person assist	    Bed Mobility: Supine to Sit:     · Level of [place]	maximum assist (25% patients effort)	  · Physical Assist/Nonphysical Assist	1 person assist	    Bed Mobility Analysis:     · Bed Mobility Limitations	decreased ability to use legs for bridging/pushing; decreased ability to use arms for pushing/pulling; impaired ability to control trunk for mobility	  · Impairments Contributing to Impaired Bed Mobility	impaired balance; impaired coordination; impaired postural control; decreased strength; decreased ROM; pain	    Transfer: Bed to Chair:     Transfer Skill: Bed to Chair   · Level of [place]	unable to perform	    Transfer: Sit to Stand:     · Level of [place]	maximum assist (25% patients effort)	  · Physical Assist/Nonphysical Assist	1 person assist	  · Weight-Bearing Restrictions	nonweight-bearing; LUE	    Transfer: Stand to Sit:     · Level of [place]	maximum assist (25% patients effort)	  · Physical Assist/Nonphysical Assist	1 person assist	  · Weight-Bearing Restrictions	nonweight-bearing; LUE	    Sit/Stand Transfer Safety Analysis:     · Transfer Safety Concerns Noted	decreased safety awareness; decreased weight-shifting ability	  · Impairments Contributing to Impaired Transfers	impaired balance; impaired postural control; decreased strength; decreased ROM; pain; impaired coordination; Pt required verbal cues to maintain NWB on LUE. Pt unable to achieve full standing position despite Max A from PT and right knee blocked	    Gait Skills:     · Level of [place]	unable to perform	    Stair Negotiation:     · Level of [place]	unable to perform	      FAMILY HISTORY   none in first    RECENT LABS/IMAGING  Reviewed            ALLERGIES  No Known Allergies      MEDICATIONS   acetaminophen    Suspension .. 650 milliGRAM(s) Oral every 6 hours PRN  ascorbic acid 500 milliGRAM(s) Oral daily  chlorhexidine 0.12% Liquid 15 milliLiter(s) Oral Mucosa two times a day  chlorhexidine 4% Liquid 1 Application(s) Topical <User Schedule>  enoxaparin Injectable 40 milliGRAM(s) SubCutaneous daily  folic acid 1 milliGRAM(s) Oral daily  labetalol Injectable 10 milliGRAM(s) IV Push every 4 hours PRN  metoprolol tartrate 50 milliGRAM(s) Oral two times a day  multivitamin 1 Tablet(s) Oral daily  OLANZapine Disintegrating Tablet 5 milliGRAM(s) Oral two times a day  valproate sodium IVPB 500 milliGRAM(s) IV Intermittent every 8 hours      ----------------------------------------------------------------------------------------  PHYSICAL EXAM  Constitutional - NAD, Comfortable  HEENT - NCAT, EOMI  Neck - Supple, No limited ROM  Chest - Breathing comfortably, No wheezing  Cardiovascular - S1S2  Abdomen - Soft   Extremities - No C/C/E, No calf tenderness, Left UE sling  Neurologic Exam -                    Cognitive - Awake, Alert, AAO to self, Part of situation - accident (accuracy improved with yes/no)      Communication - Fluent, +dysarthria     Cranial Nerves - CN 2-12 intact     Motor - No focal deficits                    LEFT    UE - ShAB 5/5, EF 5/5, EE 5/5, WE 5/5,  5/5                    RIGHT UE - ShAB 5/5, EF 5/5, EE 5/5, WE 5/5,  5/5                    LEFT    LE - HF 5/5, KE 5/5, DF 5/5, PF 5/5                    RIGHT LE - HF 5/5, KE 5/5, DF 5/5, PF 5/5        Sensory - Intact to LT     Reflexes - DTR Intact, No primitive reflexive     Coordination - FTN impaired on right (NT on left)  Psychiatric - Mood stable, Affect Flat  ----------------------------------------------------------------------------------------  ASSESSMENT/PLAN  70yMale with functional deficits after developing PNA and encephalopathy related to COVID  COVID 3/20 + - s/p ABX, S/p extubation   Seizures - Likely related to acute illness, Depakote, [facility] neurology outpatient  Encephalopathy - Recommend Decreased Zyprexa to 5mg Q8PM, Recommend Melatonin 5mg Q8PM, Recommend Amantadine 100mg Q6AM/12PM  Cardiac - Recommend PO alternate to Labetalol IV  Left humeral Fracture - Sling, NWB, FU Ortho Outpatient   Pain - Tylenol  DVT PPX - SCDs, Lovenox  Rehab - Recommend ACUTE inpatient rehabilitation for the functional deficits consisting of 3 hours of therapy/day & 24 hour RN/daily PMR physician for comorbid medical management. Will continue to follow for ongoing rehab needs and recommendations. Patient will be able to tolerate 3 hours a day.    Continue bedside therapy as well as OOB throughout the day with mobilization throughout the day with staff to maintain cardiopulmonary function and prevention of secondary complications related to debility.      At the time of this notation, as per administration at [facility], admission to Acute Inpatient Rehabilitation Facility with +COVID needs to have the following requirements:    1. Patient must be at least 7 days post COVID+ testing - DONE 3/20  2. Patient must be fever free without use of antipyretic for at least 72 hours - DONE   3. Patient must have a REPEAT COVID NEGATIVE test completed. - PENDING  4. Final determination of acceptance to  AR will be made by [facility] Administration and Medical Directors - PENDING    Please understand that the above requirements may change and will be updated. 70yM was admitted on 03-20 after returning from [place] with dizziness. He returned with witnessed seizure and hypoxia/respiratory failure and aspiration. He received Ativan IV and is s/p emergent intubation. He tested +COVID.    Head CT - no acute findings.   CHEST CT - Limited, motion study.Bibasilar airspace consolidations, which may reflect pneumonia in the appropriate clinical setting.Trace pleural effusions. Fracture proximal left humerus, recommend clinical correlation and dedicated left shoulder/humerus radiographs.  LEFT SHOULDER CT 3/30 - Redemonstrated comminuted fracture of the proximal left humerus involving the anterior medial aspect of the humeral head and lesser tuberosity. Persistent posterior dislocation of the humeral head. Fracture of the posterior aspect of the glenoid. Os acromiale. Subtle linear lucencies in the acromion are equivocal for normal variant versus nondisplaced fracture. Scattered nonspecific opacities in the left lung, evaluation limited. Correlate clinically for possible infection.    Patient is s/p Zithromax, extubated 3/24 and has weaned off O2.   Patient was maintained on Depakote for seizure PPX.   Patient was started on Zyprexa for delirium/encephalopathy.  Patient was treated conservatively for left humeral fracture with sling.     Patient reports pain in his left shoulder.   Limited verbalizations.   Follows commands, slowed.       REVIEW OF SYSTEMS  Constitutional - No fever, No weight loss, +fatigue  HEENT - No eye pain, No visual disturbances, No difficulty hearing, No tinnitus, No vertigo, No neck pain  Respiratory - No cough, No wheezing, No shortness of breath  Cardiovascular - No chest pain, No palpitations  Gastrointestinal - No abdominal pain, No nausea, No vomiting, No diarrhea, No constipation  Genitourinary - No dysuria, No frequency, No hematuria, No incontinence  Neurological - No headaches, +memory loss, +loss of strength, No numbness, No tremors  Skin - No itching, No rashes, No lesions   Endocrine - No temperature intolerance  Musculoskeletal - +joint pain, No joint swelling, +muscle pain  Psychiatric - No depression, No anxiety    VITALS  T(C): 36.5 (04-03-20 @ 05:06), Max: 37.7 (04-02-20 @ 20:50)  HR: 85 (04-03-20 @ 05:06) (79 - 88)  BP: 130/79 (04-03-20 @ 05:06) (130/68 - 160/96)  RR: 16 (04-03-20 @ 05:06) (16 - 20)  SpO2: 94% (04-03-20 @ 05:06) (91% - 95%)  Wt(kg): --    PAST MEDICAL & SURGICAL HISTORY  HTN (hypertension)  No significant past surgical history      SOCIAL HISTORY  Smoking - Denied  EtOH - Denied   Drugs - Denied    FUNCTIONAL HISTORY  Lives with spouse, 4 ARA  Independent    CURRENT FUNCTIONAL STATUS  3/31  Bed Mobility: Rolling/Turning:     · Level of [place]	moderate assist (50% patients effort)	  · Physical Assist/Nonphysical Assist	1 person assist	    Bed Mobility: Scooting/Bridging:     · Level of [place]	maximum assist (25% patients effort)	  · Physical Assist/Nonphysical Assist	1 person assist	    Bed Mobility: Sit to Supine:     · Level of [place]	dependent (less than 25% patients effort)	  · Physical Assist/Nonphysical Assist	1 person assist	    Bed Mobility: Supine to Sit:     · Level of [place]	maximum assist (25% patients effort)	  · Physical Assist/Nonphysical Assist	1 person assist	    Bed Mobility Analysis:     · Bed Mobility Limitations	decreased ability to use legs for bridging/pushing; decreased ability to use arms for pushing/pulling; impaired ability to control trunk for mobility	  · Impairments Contributing to Impaired Bed Mobility	impaired balance; impaired coordination; impaired postural control; decreased strength; decreased ROM; pain	    Transfer: Bed to Chair:     Transfer Skill: Bed to Chair   · Level of [place]	unable to perform	    Transfer: Sit to Stand:     · Level of [place]	maximum assist (25% patients effort)	  · Physical Assist/Nonphysical Assist	1 person assist	  · Weight-Bearing Restrictions	nonweight-bearing; LUE	    Transfer: Stand to Sit:     · Level of [place]	maximum assist (25% patients effort)	  · Physical Assist/Nonphysical Assist	1 person assist	  · Weight-Bearing Restrictions	nonweight-bearing; LUE	    Sit/Stand Transfer Safety Analysis:     · Transfer Safety Concerns Noted	decreased safety awareness; decreased weight-shifting ability	  · Impairments Contributing to Impaired Transfers	impaired balance; impaired postural control; decreased strength; decreased ROM; pain; impaired coordination; Pt required verbal cues to maintain NWB on LUE. Pt unable to achieve full standing position despite Max A from PT and right knee blocked	    Gait Skills:     · Level of [place]	unable to perform	    Stair Negotiation:     · Level of [place]	unable to perform	      FAMILY HISTORY   none in first    RECENT LABS/IMAGING  Reviewed            ALLERGIES  No Known Allergies      MEDICATIONS   acetaminophen    Suspension .. 650 milliGRAM(s) Oral every 6 hours PRN  ascorbic acid 500 milliGRAM(s) Oral daily  chlorhexidine 0.12% Liquid 15 milliLiter(s) Oral Mucosa two times a day  chlorhexidine 4% Liquid 1 Application(s) Topical <User Schedule>  enoxaparin Injectable 40 milliGRAM(s) SubCutaneous daily  folic acid 1 milliGRAM(s) Oral daily  labetalol Injectable 10 milliGRAM(s) IV Push every 4 hours PRN  metoprolol tartrate 50 milliGRAM(s) Oral two times a day  multivitamin 1 Tablet(s) Oral daily  OLANZapine Disintegrating Tablet 5 milliGRAM(s) Oral two times a day  valproate sodium IVPB 500 milliGRAM(s) IV Intermittent every 8 hours      ----------------------------------------------------------------------------------------  PHYSICAL EXAM  Constitutional - NAD, Comfortable  HEENT - NCAT, EOMI  Neck - Supple, No limited ROM  Chest - Breathing comfortably, No wheezing  Cardiovascular - S1S2  Abdomen - Soft   Extremities - No C/C/E, No calf tenderness, Left UE sling  Neurologic Exam -                    Cognitive - Awake, Alert, AAO to self, Part of situation - accident (accuracy improved with yes/no)      Communication - Fluent, +dysarthria     Cranial Nerves - CN 2-12 intact     Motor - No focal deficits                    LEFT    UE - ShAB 5/5, EF 5/5, EE 5/5, WE 5/5,  5/5                    RIGHT UE - ShAB 5/5, EF 5/5, EE 5/5, WE 5/5,  5/5                    LEFT    LE - HF 5/5, KE 5/5, DF 5/5, PF 5/5                    RIGHT LE - HF 5/5, KE 5/5, DF 5/5, PF 5/5        Sensory - Intact to LT     Reflexes - DTR Intact, No primitive reflexive     Coordination - FTN impaired on right (NT on left)  Psychiatric - Mood stable, Affect Flat  ----------------------------------------------------------------------------------------  ASSESSMENT/PLAN  70yMale with functional deficits after developing PNA and encephalopathy related to COVID  COVID 3/20 + - s/p ABX, S/p extubation   Seizures - Likely related to acute illness, Depakote, [facility] neurology outpatient  Encephalopathy - Recommend Decreased Zyprexa to 5mg Q8PM, Recommend Melatonin 5mg Q8PM, Recommend Amantadine 100mg Q6AM/12PM  Cardiac - Recommend PO alternate to Labetalol IV  Left humeral Fracture - Sling, NWB, FU Ortho Outpatient   Pain - Tylenol  DVT PPX - SCDs, Lovenox  Rehab - Recommend ACUTE inpatient rehabilitation for the functional deficits consisting of 3 hours of therapy/day & 24 hour RN/daily PMR physician for comorbid medical management. Will continue to follow for ongoing rehab needs and recommendations. Patient will be able to tolerate 3 hours a day.    Continue bedside therapy as well as OOB throughout the day with mobilization throughout the day with staff to maintain cardiopulmonary function and prevention of secondary complications related to debility.      At the time of this notation, as per administration at , admission to Acute Inpatient Rehabilitation Facility with +COVID needs to have the following requirements:    1. Patient must be at least 7 days post COVID+ testing - DONE 3/20  2. Patient must be fever free without use of antipyretic for at least 72 hours - HIGH 39 MAX 4/1  3. Patient must have a REPEAT COVID NEGATIVE test completed. - PENDING  4. Final determination of acceptance to  AR will be made by [facility] Administration and Medical Directors - PENDING    Please understand that the above requirements may change and will be updated.

## 2020-04-03 NOTE — OCCUPATIONAL THERAPY INITIAL EVALUATION ADULT - PHYSICAL ASSIST/NONPHYSICAL ASSIST: SUPINE/SIT, REHAB EVAL
Verbal cues for sequencing and reminders to not weight bear through Left UE/verbal cues/1 person assist

## 2020-04-03 NOTE — OCCUPATIONAL THERAPY INITIAL EVALUATION ADULT - PRECAUTIONS/LIMITATIONS, REHAB EVAL
aspiration precautions/isolation precautions/seizure precautions/COVID airborne & contact precautions/fall precautions

## 2020-04-03 NOTE — OCCUPATIONAL THERAPY INITIAL EVALUATION ADULT - MANUAL MUSCLE TESTING RESULTS, REHAB EVAL
Left UE not tested secondary to fracture, Right UE not tested secondary to pain. Bilateral gross grasp WFL. Assessed Right UE during functional transfers- pt able to use UE to assist./grossly assessed due to

## 2020-04-03 NOTE — OCCUPATIONAL THERAPY INITIAL EVALUATION ADULT - PHYSICAL ASSIST/NONPHYSICAL ASSIST: SIT/SUPINE, REHAB EVAL
1 person assist/verbal cues/Verbal cues for sequencing and reminders to not weight bear through Left UE. Physical assist to achieve safe, proper position

## 2020-04-03 NOTE — PROGRESS NOTE ADULT - ASSESSMENT
70 yr male presents with fever, seizure, encephaloapthy and resp failure secondary to COVID 19. Pt required intubation, now extubated and weaned from supplemental O2. Pt was seen by neuro for seizure evaluation, c/w Depakote and o/p follow up. During hospital stay pt incidentally found with left humeral fracture, seen by ortho and placed in sling, o/p follow up. Medically stable for DC to acute rehab.       1. Pneumonia, viral   - Present on admission  - CXR showing bilateral opacity  - S/p intubation, now extubated and weaned off supplemental   - Likely secondary to COVID-19, COVID positive  - Completed Zithromax  - Pt was weaned off of supplemental oxygen. Currently on room air and maintaing sats      2. Acute respiratory failure with hypoxia  - S/p intubation, now extubated and weaned off supplemental O2  - Resolved     3. Seizure  - Unclear etiology  - MRI brain is unremarkable  - LP negative  - Seen by neuro, continue depakate, o/p follow up, seizure protocol.     4.  Essential hypertension  - Metoprolol 50mg po BID. BP has been stable     5.  Encephalopathy acute, likely acute metabolic encephalopathy  - Resolved, at baseline  - Unclear etiology, possibly secondary to seziure/infection/metabolic derangements now resolved  - MRI brain negative, LP negative  - C/w ativan, Zyprexa, Thiamine, depakote  - Speech and swallow team is following has been started diet as recommended,     6. Acute pain of left shoulder  - Unclear etiology, no documented falls during hospital stay   -  Shoulder xray and CT showing displaced left proximal humeral neck fracture,  - Seen by Ortho. Recommended arm Sling, Finger ROM to help resolution of forearm and hand edema.  - F/U with Dr Barr as outpt     Disp:  PT / PM&R complete, plan for acute rehab. CCM aware, setting up placement. Stable for DC when placemnet established 70 yr male presents with fever, seizure, encephalopathy and resp failure secondary to COVID 19. In the ED, pt with witnessed seizure complicated by aspiration prompting intubation. Pt later extubated and weaned from supplemental O2. Pt was seen by neuro for seizure evaluation, c/w Depakote and o/p follow up. During hospital stay pt incidentally found with left humeral fracture, seen by ortho and placed in sling, o/p follow up. Medically stable for DC to acute rehab.       1. Pneumonia, viral   - Present on admission  - CXR showing bilateral opacity  - S/p intubation, now extubated and weaned off supplemental   - Likely secondary to COVID-19, COVID positive  - Completed Zithromax  - Pt was weaned off of supplemental oxygen. Currently on room air and maintaing sats      2. Acute respiratory failure with hypoxia  - Secondary to aspiration during seizure and COVID 19  - S/p intubation, now extubated and weaned off supplemental O2  - Resolved     3. Seizure  - Unclear etiology  - MRI brain is unremarkable  - LP negative  - No further seizure activity during hospital stay  - Seen by neuro, continue depakate, o/p follow up, seizure protocol.     4.  Essential hypertension  - Metoprolol 50mg po BID. BP has been stable     5.  Encephalopathy acute, likely acute metabolic encephalopathy  - Resolved, at baseline  - Unclear etiology, possibly secondary to seziure/infection/metabolic derangements now resolved  - MRI brain negative, LP negative  - C/w ativan, Zyprexa, Thiamine, depakote  - Speech and swallow team is following has been started diet as recommended,     6. Acute pain of left shoulder  - Unclear etiology, no documented falls during hospital stay   -  Shoulder xray and CT showing displaced left proximal humeral neck fracture,  - Seen by Ortho. Recommended arm Sling, Finger ROM to help resolution of forearm and hand edema.  - F/U with Dr Barr as outpt     Disp:  PT / PM&R complete, plan for acute rehab. CCM aware, setting up placement. Stable for DC when placemnet established 70 yr male presents with fever, seizure, encephalopathy and resp failure secondary to COVID 19. In the ED, pt with witnessed seizure complicated by aspiration prompting intubation. Pt later extubated and weaned from supplemental O2. Pt was seen by neuro for seizure evaluation, c/w Depakote and o/p follow up. During hospital stay pt incidentally found with left humeral fracture, seen by ortho and placed in sling, o/p follow up. Medically stable for DC to acute rehab.       1. Pneumonia, viral   - Present on admission  - CXR showing bilateral opacity  - S/p intubation, now extubated and weaned off supplemental   - Likely secondary to COVID-19, COVID positive  - Completed Zithromax  - Pt was weaned off of supplemental oxygen. Currently on room air and maintaing sats      2. Acute respiratory failure with hypoxia  - Secondary to aspiration during seizure and COVID 19  - S/p intubation, now extubated and weaned off supplemental O2  - Resolved     3. Seizure  - Unclear etiology  - MRI brain is unremarkable  - LP negative  - No further seizure activity during hospital stay  - Seen by neuro, continue depakate, o/p follow up, seizure protocol.     4.  Essential hypertension  - Metoprolol 50mg po BID. BP has been stable     5.  Encephalopathy acute, likely acute metabolic encephalopathy  - Resolved, at baseline  - Unclear etiology, possibly secondary to seziure/infection/metabolic derangements now resolved  - MRI brain negative, LP negative  - C/w ativan, Zyprexa, Thiamine, depakote  - Speech and swallow team is following has been started diet as recommended,     6. Acute pain of left shoulder  - Unclear etiology, no documented falls during hospital stay   -  Shoulder xray and CT showing displaced left proximal humeral neck fracture,  - Seen by Ortho. Recommended arm Sling, Finger ROM to help resolution of forearm and hand edema.  - F/U with Dr Barr as outpt     Disp:  PT / PM&R complete, plan for acute rehab. PT IS MEDICALLY STABLE FOR DISCHARGE. DISCUSSED WITH [facility], PT REQUIRES NEGATIVE COVID TEST AND TO BE AFEBRILE FOR 72 HOURS. Covid test ordered. Last spiked temp 4/1, if no temp by 4/4 and covid neg then can DC tomorrow 4/42020. 70 yr male presents with Generalized weakness and AMS and was noted to have with fever, seizure, encephalopathy and resp failure secondary to COVID 19. In the ED, pt with witnessed seizure complicated by aspiration prompting intubation, patient was management in the ICU, antibiotics, vent support and Seizure meds, later on extubated and was transferred out of the ICU, his respiratory failure resolved and  for was continued to have AMS and s/p LP and CSF fluid was unremarkable, patient was seen and followed by Neurology, and was continued wiht Depakote, MRI brain is unremarkable, No further seizure activity during rest of the course of hospitalization, as per neuro follow up with them in the office as out patient, his Encephalopathy was likely secondary to seziure/infection/metabolic derangements now resolved, he has been AOX3, he was seen and followed by Speech and swallow team and has been given diet as recommended.    patient was noted to have left arm swelling, US of the arm done showed No evidence of left upper extremity deep venous thrombosis. The left cephalic vein and left ulnar vein are not visualized, There is a complex fluid collection medial aspect of the left shoulder measuring 3.7 x 1.6 x 2.0 cm, patient was unable to move his shoulder, xray and CT showing displaced left proximal humeral neck fracture, patient was seen and followed by Ortho, Recommended arm Sling, Finger ROM to help resolution of forearm and hand edema, F/U with Dr Barr as outpt   Patient left arm swelling improved significantly his pain was managed with pain meds.   patient was seen by PT / PM&R complete, plan for acute rehab. PT IS MEDICALLY STABLE FOR DISCHARGE. DISCUSSED WITH [facility], PT REQUIRES NEGATIVE COVID TEST AND TO BE AFEBRILE FOR 72 HOURS. Covid test ordered. Last spiked temp 4/1, if no temp by 4/4 and covid neg then can DC tomorrow 4/63447.             Plan:     1. Pneumonia, viral: resolved, saturating well on RA      2. Acute respiratory failure with hypoxia: resolved, saturating well on RA    3. Seizure  - Unclear etiology  - MRI brain is unremarkable  - LP negative  - No further seizure activity during hospital stay  - Seen by neuro, continue depakate, o/p follow up, seizure protocol.     4.  Essential hypertension  - Metoprolol 50mg po BID. BP has been stable     5.  Encephalopathy acute, likely acute metabolic encephalopathy, resolved he is AOX3  - Resolved, at baseline  - Unclear etiology, possibly secondary to seziure/infection/metabolic derangements now resolved  - MRI brain negative, LP negative  - C/w ativan, Zyprexa, Thiamine, depakote  - Speech and swallow team is following has been started diet as recommended,     6. Acute pain of left shoulder  - Unclear etiology, no documented falls during hospital stay   -  Shoulder xray and CT showing displaced left proximal humeral neck fracture,  - Seen by Ortho. Recommended arm Sling, Finger ROM to help resolution of forearm and hand edema.  - F/U with Dr Barr as outpt     Disp:  PT / PM&R complete, plan for acute rehab. PT IS MEDICALLY STABLE FOR DISCHARGE. DISCUSSED WITH CCM, PT REQUIRES NEGATIVE COVID TEST AND TO BE AFEBRILE FOR 72 HOURS. Covid test ordered. Last spiked temp 4/1, if no temp by 4/4 and covid neg then can DC tomorrow 4/07352.

## 2020-04-03 NOTE — OCCUPATIONAL THERAPY INITIAL EVALUATION ADULT - IMPAIRED TRANSFERS: BED/CHAIR, REHAB EVAL
impaired balance/impaired coordination/decreased flexibility/narrow base of support/pain/decreased ROM/decreased strength

## 2020-04-03 NOTE — CONSULT NOTE ADULT - REASON FOR ADMISSION
respiratory failure, seizure, aspiration pna

## 2020-04-03 NOTE — OCCUPATIONAL THERAPY INITIAL EVALUATION ADULT - PHYSICAL ASSIST/NONPHYSICAL ASSIST: SCOOT/BRIDGE, REHAB EVAL
Physical assist to achieve upright position seated at edge of bed. Verbal cues for sequencing and reminders to not weight bear through Left UE/1 person assist/verbal cues

## 2020-04-03 NOTE — OCCUPATIONAL THERAPY INITIAL EVALUATION ADULT - PLANNED THERAPY INTERVENTIONS, OT EVAL
neuromuscular re-education/ROM/balance training/motor coordination training/transfer training/ADL retraining/bed mobility training/strengthening/fine motor coordination training

## 2020-04-03 NOTE — PROGRESS NOTE ADULT - ATTENDING COMMENTS
Patient is seen and evaluated, case discussed with PA , agree with assessment and plan  Patient is doing ok, denies fever, chills, SOB  CTA b/l   Left arm decrease movement due to left shoulder joint  Patient is getting Patient is seen and evaluated, case discussed with PA , agree with assessment and plan  Patient is doing ok, denies fever, chills, SOB  CTA b/l   Left arm decrease movement due to left shoulder joint  Patient is getting OT and PT, likely discharge once ravagements done

## 2020-04-03 NOTE — PROGRESS NOTE ADULT - SUBJECTIVE AND OBJECTIVE BOX
SAGE CAMARA    [id number]    70y      Male    Patient is a 70y old  Male who presents with a chief complaint of respiratory failure, seizure, aspiration pna (01 Apr 2020 17:41)    INTERVAL HPI/OVERNIGHT EVENTS: No overnight events    Pt seen and examined at bedside  A&O x 2-3, pleasantly confused at times  Left shoulder sling in place  No complaints  VSS - on RA    Vital Signs Last 24 Hrs  T(C): 36.5 (03 Apr 2020 05:06), Max: 37.7 (02 Apr 2020 20:50)  T(F): 97.7 (03 Apr 2020 05:06), Max: 99.8 (02 Apr 2020 20:50)  HR: 85 (03 Apr 2020 05:06) (79 - 88)  BP: 130/79 (03 Apr 2020 05:06) (130/68 - 160/96)  BP(mean): --  RR: 16 (03 Apr 2020 05:06) (16 - 20)  SpO2: 94% (03 Apr 2020 05:06) (91% - 95%)    PHYSICAL EXAM:  GENERAL: Elderly male looking comfortable  HEENT: PERRL  NECK: soft, Supple, No JVD,   CHEST/LUNG: Decrease air entry bilaterally; No wheezing, no more crackles   HEART: S1S2+, Regular rate and rhythm; No murmurs  ABDOMEN: Soft, Nontender, Nondistended; Bowel sounds present  EXTREMITIES:  1+ Peripheral Pulses, No edema, left arm swelling is much better, left should unable to move because of pain, has left should sling on  SKIN: No rashes or lesions  NEURO: AOX2-3, he is able to move his all extremities except left arm because of pain, no facial asymmetry, pleasantly confused at times  PSYCH: normal mood    LABS: Reviewed

## 2020-04-04 PROCEDURE — 99233 SBSQ HOSP IP/OBS HIGH 50: CPT

## 2020-04-04 RX ADMIN — CHLORHEXIDINE GLUCONATE 15 MILLILITER(S): 213 SOLUTION TOPICAL at 05:10

## 2020-04-04 RX ADMIN — Medication 1 MILLIGRAM(S): at 11:47

## 2020-04-04 RX ADMIN — Medication 500 MILLIGRAM(S): at 11:47

## 2020-04-04 RX ADMIN — Medication 1 TABLET(S): at 11:48

## 2020-04-04 RX ADMIN — ENOXAPARIN SODIUM 40 MILLIGRAM(S): 100 INJECTION SUBCUTANEOUS at 11:47

## 2020-04-04 RX ADMIN — CHLORHEXIDINE GLUCONATE 15 MILLILITER(S): 213 SOLUTION TOPICAL at 11:49

## 2020-04-04 RX ADMIN — OLANZAPINE 5 MILLIGRAM(S): 15 TABLET, FILM COATED ORAL at 11:48

## 2020-04-04 RX ADMIN — Medication 50 MILLIGRAM(S): at 05:10

## 2020-04-04 RX ADMIN — Medication 27.5 MILLIGRAM(S): at 21:24

## 2020-04-04 RX ADMIN — Medication 50 MILLIGRAM(S): at 16:23

## 2020-04-04 RX ADMIN — CHLORHEXIDINE GLUCONATE 1 APPLICATION(S): 213 SOLUTION TOPICAL at 04:49

## 2020-04-04 RX ADMIN — Medication 27.5 MILLIGRAM(S): at 11:50

## 2020-04-04 RX ADMIN — OLANZAPINE 5 MILLIGRAM(S): 15 TABLET, FILM COATED ORAL at 21:24

## 2020-04-04 RX ADMIN — Medication 27.5 MILLIGRAM(S): at 05:10

## 2020-04-04 NOTE — PROGRESS NOTE ADULT - SUBJECTIVE AND OBJECTIVE BOX
CC: respiratory failure, seizure, aspiration pna (03 Apr 2020 09:56)    HPI:  Pt is a 70 YOM h/o HTN who presented to ED yesterday with dizziness and was discharged.  Pt represents to ED today with AMS, dizziness. Pt is poor historian.  He had a witnessed seizure in ER and received Ativan 4mg IVP.  Upon my arrival in ED pt had bitten his tongue and aspirated on blood.  He was hypoxic and required emergent intubation/see intubation note.  Pt unable to offer any further information.  No family available at this time for more history. (20 Mar 2020 21:38)    INTERVAL HPI/OVERNIGHT EVENTS:  No overnight issues noted    MEDICATIONS  (STANDING):  ascorbic acid 500 milliGRAM(s) Oral daily  chlorhexidine 0.12% Liquid 15 milliLiter(s) Oral Mucosa two times a day  chlorhexidine 4% Liquid 1 Application(s) Topical <User Schedule>  enoxaparin Injectable 40 milliGRAM(s) SubCutaneous daily  folic acid 1 milliGRAM(s) Oral daily  metoprolol tartrate 50 milliGRAM(s) Oral two times a day  multivitamin 1 Tablet(s) Oral daily  OLANZapine Disintegrating Tablet 5 milliGRAM(s) Oral two times a day  valproate sodium IVPB 500 milliGRAM(s) IV Intermittent every 8 hours    MEDICATIONS  (PRN):  acetaminophen    Suspension .. 650 milliGRAM(s) Oral every 6 hours PRN Temp greater or equal to 38.5C (101.3F)  labetalol Injectable 10 milliGRAM(s) IV Push every 4 hours PRN Systolic blood pressure > 160    Vital Signs Last 24 Hrs  T(C): 36.4 (04 Apr 2020 11:06), Max: 36.9 (04 Apr 2020 08:00)  T(F): 97.6 (04 Apr 2020 11:06), Max: 98.4 (04 Apr 2020 08:00)  HR: 78 (04 Apr 2020 08:00) (78 - 88)  BP: 154/90 (04 Apr 2020 08:00) (144/88 - 156/85)  BP(mean): --  RR: 18 (04 Apr 2020 08:00) (18 - 18)  SpO2: 98% (04 Apr 2020 08:19) (84% - 98%)  I&O's Detail    03 Apr 2020 07:01  -  04 Apr 2020 07:00  --------------------------------------------------------  IN:    Solution: 50 mL  Total IN: 50 mL    OUT:    Voided: 400 mL  Total OUT: 400 mL    Total NET: -350 mL      PHYSICIAL EXAM:    General: Comfortable, No distress  Neck: supple, no JVD, trachea midline  Heart: S1S2, no murmurs, rubs  Lungs: Clear B/L, no wheezing, rales, rhonchi  Abd: soft, nontender, no distention  Extremities: FROM, NVI CC: respiratory failure, seizure, aspiration pna (03 Apr 2020 09:56)    HPI:  Pt is a 70 YOM h/o HTN who presented to ED yesterday with dizziness and was discharged.  Pt represents to ED today with AMS, dizziness. Pt is poor historian.  He had a witnessed seizure in ER and received Ativan 4mg IVP.  Upon my arrival in ED pt had bitten his tongue and aspirated on blood.  He was hypoxic and required emergent intubation/see intubation note.  Pt unable to offer any further information.  No family available at this time for more history. (20 Mar 2020 21:38).  Presently feels good in no respiratory distress.  States minimal pain in his shoulder upon movement.    INTERVAL HPI/OVERNIGHT EVENTS:  No overnight issues noted    MEDICATIONS  (STANDING):  ascorbic acid 500 milliGRAM(s) Oral daily  chlorhexidine 0.12% Liquid 15 milliLiter(s) Oral Mucosa two times a day  chlorhexidine 4% Liquid 1 Application(s) Topical <User Schedule>  enoxaparin Injectable 40 milliGRAM(s) SubCutaneous daily  folic acid 1 milliGRAM(s) Oral daily  metoprolol tartrate 50 milliGRAM(s) Oral two times a day  multivitamin 1 Tablet(s) Oral daily  OLANZapine Disintegrating Tablet 5 milliGRAM(s) Oral two times a day  valproate sodium IVPB 500 milliGRAM(s) IV Intermittent every 8 hours    MEDICATIONS  (PRN):  acetaminophen    Suspension .. 650 milliGRAM(s) Oral every 6 hours PRN Temp greater or equal to 38.5C (101.3F)  labetalol Injectable 10 milliGRAM(s) IV Push every 4 hours PRN Systolic blood pressure > 160    Vital Signs Last 24 Hrs  T(C): 36.4 (04 Apr 2020 11:06), Max: 36.9 (04 Apr 2020 08:00)  T(F): 97.6 (04 Apr 2020 11:06), Max: 98.4 (04 Apr 2020 08:00)  HR: 78 (04 Apr 2020 08:00) (78 - 88)  BP: 154/90 (04 Apr 2020 08:00) (144/88 - 156/85)  BP(mean): --  RR: 18 (04 Apr 2020 08:00) (18 - 18)  SpO2: 98% (04 Apr 2020 08:19) (84% - 98%)  I&O's Detail    03 Apr 2020 07:01  -  04 Apr 2020 07:00  --------------------------------------------------------  IN:    Solution: 50 mL  Total IN: 50 mL    OUT:    Voided: 400 mL  Total OUT: 400 mL    Total NET: -350 mL      PHYSICIAL EXAM:    General: Comfortable, No distress  Neck: supple, no JVD, trachea midline  Heart: S1S2, no murmurs, rubs  Lungs: Clear B/L, no wheezing, rales, rhonchi  Abd: soft, nontender, no distention  Extremities: FROM, NVI

## 2020-04-04 NOTE — PROGRESS NOTE ADULT - ATTENDING COMMENTS
I have personally seen and examined patient.  Above note reviewed and discussed with PA, modified where appropriate. Pt is doing well. 94% on RA. Medically stable for d/c to Acute rehab. Awaiting acceptance there. Repeat COVID pending.

## 2020-04-04 NOTE — PROGRESS NOTE ADULT - ASSESSMENT
ssessment and Plan:   · Assessment	  70 yr male presents with Generalized weakness and AMS and was noted to have with fever, seizure, encephalopathy and resp failure secondary to COVID 19. In the ED, pt with witnessed seizure complicated by aspiration prompting intubation, patient was management in the ICU, antibiotics, vent support and Seizure meds, later on extubated and was transferred out of the ICU, his respiratory failure resolved and  for was continued to have AMS and s/p LP and CSF fluid was unremarkable, patient was seen and followed by Neurology, and was continued with Depakote, MRI brain is unremarkable, No further seizure activity during rest of the course of hospitalization, as per neuro follow up with them in the office as out patient, his Encephalopathy was likely secondary to seziure/infection/metabolic derangements now resolved, he has been AOX3, he was seen and followed by Speech and swallow team and has been given diet as recommended.      Patient was noted to have left arm swelling, US of the arm done showed No evidence of left upper extremity deep venous thrombosis. The left cephalic vein and left ulnar vein are not visualized, There is a complex fluid collection medial aspect of the left shoulder measuring 3.7 x 1.6 x 2.0 cm, patient was unable to move his shoulder, xray and CT showing displaced left proximal humeral neck fracture, patient was seen and followed by Ortho, Recommended arm Sling, Finger ROM to help resolution of forearm and hand edema, F/U with Dr Barr as outpt   Patient left arm swelling improved significantly his pain was managed with pain meds.   Patient was seen by PT / PM&R complete, plan for acute rehab. PT IS MEDICALLY STABLE FOR DISCHARGE. DISCUSSED WITH [facility], PT REQUIRES NEGATIVE COVID TEST AND TO BE AFEBRILE FOR 72 HOURS. Covid test ordered. Last spiked temp 4/1, if no temp by 4/4 and covid neg then can DC tomorrow 4/68213.       Plan:     1. Pneumonia, viral: resolved, saturating well on RA      2. Acute respiratory failure with hypoxia: resolved, saturating well on RA    3. Seizure  - Unclear etiology  - MRI brain is unremarkable  - LP negative  - No further seizure activity during hospital stay  - Seen by neuro, continue depakate, o/p follow up, seizure protocol.     4.  Essential hypertension  - Metoprolol 50mg po BID. BP has been stable     5.  Encephalopathy acute, likely acute metabolic encephalopathy, resolved he is AOX3  - Resolved, at baseline  - Unclear etiology, possibly secondary to seziure/infection/metabolic derangements now resolved  - MRI brain negative, LP negative  - C/w ativan, Zyprexa, Thiamine, depakote  - Speech and swallow team is following has been started diet as recommended,     6. Acute pain of left shoulder  - Unclear etiology, no documented falls during hospital stay   -  Shoulder xray and CT showing displaced left proximal humeral neck fracture,  - Seen by Ortho. Recommended arm Sling, Finger ROM to help resolution of forearm and hand edema.  - F/U with Dr Barr as outpt     DISPOSITION:  complete, plan for acute rehab. PT IS MEDICALLY STABLE FOR DISCHARGE. DISCUSSED WITH CCM, PT REQUIRES NEGATIVE COVID TEST AND TO BE AFEBRILE FOR 72 HOURS. Covid test ordered. Last spiked temp 4/1, if no temp by 4/4 and covid neg then can DC today 4/4/2020.

## 2020-04-04 NOTE — PROGRESS NOTE ADULT - PROVIDER SPECIALTY LIST ADULT
Hospitalist Triage Note: Pt reports last night he was using restroom when he began to have pain to RLQ of abdomen. Pt also with feeling of \"pins and needles\" to right leg. Pt seen at PCP today and urine specimen was collected and pt was told it was ok. Pt referred to ED for further evaluation. No medications given PTA.

## 2020-04-05 LAB — SARS-COV-2 RNA SPEC QL NAA+PROBE: (no result)

## 2020-04-05 PROCEDURE — 99232 SBSQ HOSP IP/OBS MODERATE 35: CPT

## 2020-04-05 RX ORDER — DIVALPROEX SODIUM 500 MG/1
250 TABLET, DELAYED RELEASE ORAL THREE TIMES A DAY
Refills: 0 | Status: DISCONTINUED | OUTPATIENT
Start: 2020-04-05 | End: 2020-04-07

## 2020-04-05 RX ORDER — DIVALPROEX SODIUM 500 MG/1
500 TABLET, DELAYED RELEASE ORAL DAILY
Refills: 0 | Status: DISCONTINUED | OUTPATIENT
Start: 2020-04-05 | End: 2020-04-05

## 2020-04-05 RX ORDER — ACETAMINOPHEN 500 MG
650 TABLET ORAL EVERY 6 HOURS
Refills: 0 | Status: DISCONTINUED | OUTPATIENT
Start: 2020-04-05 | End: 2020-04-07

## 2020-04-05 RX ORDER — OXYCODONE AND ACETAMINOPHEN 5; 325 MG/1; MG/1
1 TABLET ORAL EVERY 6 HOURS
Refills: 0 | Status: DISCONTINUED | OUTPATIENT
Start: 2020-04-05 | End: 2020-04-07

## 2020-04-05 RX ADMIN — CHLORHEXIDINE GLUCONATE 1 APPLICATION(S): 213 SOLUTION TOPICAL at 05:52

## 2020-04-05 RX ADMIN — ENOXAPARIN SODIUM 40 MILLIGRAM(S): 100 INJECTION SUBCUTANEOUS at 13:14

## 2020-04-05 RX ADMIN — OXYCODONE AND ACETAMINOPHEN 1 TABLET(S): 5; 325 TABLET ORAL at 09:53

## 2020-04-05 RX ADMIN — Medication 1 TABLET(S): at 13:13

## 2020-04-05 RX ADMIN — DIVALPROEX SODIUM 250 MILLIGRAM(S): 500 TABLET, DELAYED RELEASE ORAL at 21:51

## 2020-04-05 RX ADMIN — Medication 50 MILLIGRAM(S): at 05:55

## 2020-04-05 RX ADMIN — Medication 500 MILLIGRAM(S): at 13:13

## 2020-04-05 RX ADMIN — OLANZAPINE 5 MILLIGRAM(S): 15 TABLET, FILM COATED ORAL at 09:53

## 2020-04-05 RX ADMIN — Medication 1 MILLIGRAM(S): at 13:13

## 2020-04-05 RX ADMIN — DIVALPROEX SODIUM 250 MILLIGRAM(S): 500 TABLET, DELAYED RELEASE ORAL at 16:39

## 2020-04-05 RX ADMIN — OLANZAPINE 5 MILLIGRAM(S): 15 TABLET, FILM COATED ORAL at 17:59

## 2020-04-05 RX ADMIN — Medication 50 MILLIGRAM(S): at 17:54

## 2020-04-05 RX ADMIN — CHLORHEXIDINE GLUCONATE 15 MILLILITER(S): 213 SOLUTION TOPICAL at 05:55

## 2020-04-05 RX ADMIN — Medication 27.5 MILLIGRAM(S): at 05:55

## 2020-04-05 RX ADMIN — CHLORHEXIDINE GLUCONATE 15 MILLILITER(S): 213 SOLUTION TOPICAL at 17:54

## 2020-04-05 NOTE — PROGRESS NOTE ADULT - SUBJECTIVE AND OBJECTIVE BOX
SAGE CAMARA Patient is a 70y old  Male who presents with a chief complaint of respiratory failure, seizure, aspiration pna (04 Apr 2020 11:28)     The patient was seen and evaluated at bedside  no overnight events  patient is complaining of pain L arm pain, rating pain 8/10  Denied any fever chest pain, palpitations, shortness of breath, abdominal pain, fever, dysuria, cough, edema   VSS at bedside, O2Sat 94% on RA    Vital Signs Last 24 Hrs  T(C): 36.9 (05 Apr 2020 08:27), Max: 36.9 (05 Apr 2020 08:27)  T(F): 98.4 (05 Apr 2020 08:27), Max: 98.4 (05 Apr 2020 08:27)  HR: 90 (05 Apr 2020 08:27) (82 - 90)  BP: 158/91 (05 Apr 2020 08:27) (148/74 - 161/78)  BP(mean): --  RR: 20 (05 Apr 2020 08:27) (16 - 20)  SpO2: 94% (05 Apr 2020 03:59) (94% - 99%)    PHYSICAL EXAM:    GENERAL:  Well-appearing male patient sitting in bed. Pt is NAD  CV: + s1,s2 regular rate and rhythm  Lungs: normal effort, CTA bilat. no wheezes, rales or rhonchi  ABDOMEN: soft, nontender, normoactive BS.  EXTREMITIES:  no edema, cyanosis  skin: warm, dry, no rashes.   Neuro: A&O x 3, no sensory or motor deficit. ,      LABS: reviewed  COVID PCR- pending results

## 2020-04-05 NOTE — PROGRESS NOTE ADULT - ATTENDING COMMENTS
I have personally seen and examined patient.  Above note reviewed and discussed with PA, modified where appropriate. Pt doing well 94% on RA. Repeat COVID +ve. Pt day will of hospitalisation. Needed a negative COVID prior to d/c to Acute Rehab. WIll discuss with CM in am

## 2020-04-05 NOTE — PROGRESS NOTE ADULT - ASSESSMENT
70 yr male presents with Generalized weakness and AMS and was noted to have with fever, seizure, encephalopathy and resp failure secondary to COVID 19. In the ED, pt with witnessed seizure complicated by aspiration prompting intubation, patient was management in the ICU, antibiotics, vent support and Seizure meds, later on extubated and was transferred out of the ICU, his respiratory failure resolved and  for was continued to have AMS and s/p LP and CSF fluid was unremarkable, patient was seen and followed by Neurology, and was continued with Depakote, MRI brain is unremarkable, No further seizure activity during rest of the course of hospitalization, as per neuro follow up with them in the office as out patient, his Encephalopathy was likely secondary to seziure/infection/metabolic derangements now resolved, he has been AOX3, he was seen and followed by Speech and swallow team and has been given diet as recommended.      Patient was noted to have left arm swelling, US of the arm done showed No evidence of left upper extremity deep venous thrombosis. The left cephalic vein and left ulnar vein are not visualized, There is a complex fluid collection medial aspect of the left shoulder measuring 3.7 x 1.6 x 2.0 cm, patient was unable to move his shoulder, xray and CT showing displaced left proximal humeral neck fracture, patient was seen and followed by Ortho, Recommended arm Sling, Finger ROM to help resolution of forearm and hand edema, F/U with Dr Barr as outpt   Patient left arm swelling improved significantly his pain was managed with pain meds.   Patient was seen by PT / PM&R complete, plan for acute rehab. PT IS MEDICALLY STABLE FOR DISCHARGE. DISCUSSED WITH [facility], PT REQUIRES NEGATIVE COVID TEST AND TO BE AFEBRILE FOR 72 HOURS. COVID testing still pending no new fevers since 4/1.      Plan:     1. Pneumonia, viral:  - Resolved   - O2Sat 94% on RA.      2. Acute respiratory failure with hypoxia:   - Resolved  - see above     3. Seizure  - No new seizure activity   - Unclear etiology  - MRI brain is unremarkable  - LP negative  -  Seen by Neuro,  recommend continue depakate, o/p follow up, seizure protocol.     4.  Essential hypertension  - Metoprolol 50mg po BID. BP has been stable     5.  Encephalopathy acute, likely acute metabolic encephalopathy  - Resolved, at baseline of A&Ox3  - Unclear etiology, possibly secondary to seziure/infection/metabolic derangements now resolved  - MRI brain negative, LP negative  - C/w ativan, Zyprexa, Thiamine, depakote  - Speech and swallow team is following has been started diet as recommended,     6. Acute pain of left shoulder  - Pt. c/o severe pain (8/10)   - Ordered oxycodone PRN q6hr for severe pain   -  Shoulder xray and CT showing displaced left proximal humeral neck fracture,  - Seen by Ortho. Recommended arm Sling, Finger ROM to help resolution of forearm and hand edema.  -  outpatient F/U with Dr Barr     DISPOSITION:  complete, plan for acute rehab. PT IS MEDICALLY STABLE FOR DISCHARGE. DISCUSSED WITH CCM, PT REQUIRES NEGATIVE COVID TEST AND TO BE AFEBRILE FOR 72 HOURS. Covid test ordered. Last spiked temp 4/1, if no temp and covid neg then can DC today 4/5/2020.

## 2020-04-06 LAB
ALBUMIN SERPL ELPH-MCNC: 3 G/DL — LOW (ref 3.3–5.2)
ALP SERPL-CCNC: 177 U/L — HIGH (ref 40–120)
ALT FLD-CCNC: 148 U/L — HIGH
AST SERPL-CCNC: 69 U/L — HIGH
BILIRUB DIRECT SERPL-MCNC: 0.2 MG/DL — SIGNIFICANT CHANGE UP (ref 0–0.3)
BILIRUB INDIRECT FLD-MCNC: 0.4 MG/DL — SIGNIFICANT CHANGE UP (ref 0.2–1)
BILIRUB SERPL-MCNC: 0.6 MG/DL — SIGNIFICANT CHANGE UP (ref 0.4–2)
PROT SERPL-MCNC: 7.7 G/DL — SIGNIFICANT CHANGE UP (ref 6.6–8.7)

## 2020-04-06 PROCEDURE — 99233 SBSQ HOSP IP/OBS HIGH 50: CPT

## 2020-04-06 PROCEDURE — 99232 SBSQ HOSP IP/OBS MODERATE 35: CPT

## 2020-04-06 RX ADMIN — OLANZAPINE 5 MILLIGRAM(S): 15 TABLET, FILM COATED ORAL at 21:51

## 2020-04-06 RX ADMIN — DIVALPROEX SODIUM 250 MILLIGRAM(S): 500 TABLET, DELAYED RELEASE ORAL at 21:51

## 2020-04-06 RX ADMIN — CHLORHEXIDINE GLUCONATE 15 MILLILITER(S): 213 SOLUTION TOPICAL at 16:45

## 2020-04-06 RX ADMIN — CHLORHEXIDINE GLUCONATE 15 MILLILITER(S): 213 SOLUTION TOPICAL at 05:20

## 2020-04-06 RX ADMIN — Medication 1 TABLET(S): at 16:46

## 2020-04-06 RX ADMIN — Medication 1 MILLIGRAM(S): at 16:46

## 2020-04-06 RX ADMIN — ENOXAPARIN SODIUM 40 MILLIGRAM(S): 100 INJECTION SUBCUTANEOUS at 16:45

## 2020-04-06 RX ADMIN — DIVALPROEX SODIUM 250 MILLIGRAM(S): 500 TABLET, DELAYED RELEASE ORAL at 16:45

## 2020-04-06 RX ADMIN — Medication 500 MILLIGRAM(S): at 16:44

## 2020-04-06 RX ADMIN — OLANZAPINE 5 MILLIGRAM(S): 15 TABLET, FILM COATED ORAL at 16:46

## 2020-04-06 RX ADMIN — DIVALPROEX SODIUM 250 MILLIGRAM(S): 500 TABLET, DELAYED RELEASE ORAL at 05:20

## 2020-04-06 RX ADMIN — CHLORHEXIDINE GLUCONATE 1 APPLICATION(S): 213 SOLUTION TOPICAL at 05:20

## 2020-04-06 RX ADMIN — Medication 50 MILLIGRAM(S): at 16:47

## 2020-04-06 RX ADMIN — Medication 50 MILLIGRAM(S): at 05:20

## 2020-04-06 NOTE — PROGRESS NOTE ADULT - SUBJECTIVE AND OBJECTIVE BOX
Patient in bed.  Confused about his left UE and what is the plan.  Wants to talk to doctor about how it happened.   Provided redirection and appeared to understand.   However, could not reiterate what was stated.     REVIEW OF SYSTEMS  Constitutional - No fever,  +fatigue  HEENT - No vertigo, No neck pain  Neurological - No headaches, +memory loss, +loss of strength  Musculoskeletal - +joint pain, No joint swelling, +muscle pain  Psychiatric - No depression, No anxiety    FUNCTIONAL PROGRESS  4/5  Bed Mobility  Bed Mobility Training Rehab Potential: good, to achieve stated therapy goals  Bed Mobility Training Symptoms Noted During/After Treatment: fatigue  Bed Mobility Training Rolling/Turning: stand-by assist;  minimum assist (75% patient effort);  verbal cues;  bed rails  Bed Mobility Training Scooting: stand-by assist;  minimum assist (75% patient effort);  verbal cues;  supervision;  bed rails  Bed Mobility Training Sit-to-Supine: minimum assist (75% patient effort);  stand-by assist;  1 person assist;  verbal cues;  bed rails  Bed Mobility Training Supine-to-Sit: minimum assist (75% patient effort);  stand-by assist;  bed rails;  verbal cues;  supervision  Bed Mobility Training Limitations: decreased ability to use legs for bridging/pushing;  decreased ROM;  decreased strength;  impaired coordination;  impaired balance;  cognitive, decreased safety awareness    Sit-Stand Transfer Training  Sit-to-Stand Transfer Training Rehab Potential: good, to achieve stated therapy goals  Sit-to-Stand Transfer Training Symptoms Noted During/After Treatment: fatigue  Transfer Training Sit-to-Stand Transfer: minimum assist (75% patient effort);  stand-by assist;  1 person assist;  verbal cues;  full weight-bearing   rolling walker  Transfer Training Stand-to-Sit Transfer: rolling walker;  weight-bearing as tolerated   verbal cues;  1 person assist;  minimum assist (75% patient effort);  stand-by assist  Sit-to-Stand Transfer Training Transfer Safety Analysis: decreased step length;  decreased sequencing ability;  decreased weight-shifting ability;  decreased balance;  decreased ROM;  decreased strength;  impaired balance;  impaired coordination;  cognitive, decreased safety awareness    Gait Training  Gait Training Rehab Potential: good, to achieve stated therapy goals  Gait Training Symptoms Noted During/After Treatment: fatigue  Gait Training: minimum assist (75% patient effort);  verbal cues;  1 person assist;  full weight-bearing   rolling walker;  50 feet  Gait Analysis: 3-point gait   decreased arlette;  decreased hip/knee flexion;  decreased step length;  decreased stride length;  decreased weight-shifting ability;  decreased ROM;  decreased strength;  impaired balance;  impaired coordination;  cognitive, decreased safety awareness      VITALS  T(C): 36.8 (04-06-20 @ 07:39), Max: 36.8 (04-06-20 @ 06:33)  HR: 76 (04-06-20 @ 07:39) (76 - 94)  BP: 137/75 (04-06-20 @ 07:39) (137/75 - 157/81)  RR: 19 (04-06-20 @ 07:39) (17 - 20)  SpO2: 93% (04-06-20 @ 00:23) (92% - 93%)  Wt(kg): --    MEDICATIONS   acetaminophen    Suspension .. 650 milliGRAM(s) every 6 hours PRN  ascorbic acid 500 milliGRAM(s) daily  chlorhexidine 0.12% Liquid 15 milliLiter(s) two times a day  chlorhexidine 4% Liquid 1 Application(s) <User Schedule>  diVALproex  milliGRAM(s) three times a day  enoxaparin Injectable 40 milliGRAM(s) daily  folic acid 1 milliGRAM(s) daily  labetalol Injectable 10 milliGRAM(s) every 4 hours PRN  metoprolol tartrate 50 milliGRAM(s) two times a day  multivitamin 1 Tablet(s) daily  OLANZapine Disintegrating Tablet 5 milliGRAM(s) two times a day  oxycodone    5 mG/acetaminophen 325 mG 1 Tablet(s) every 6 hours PRN      RECENT LABS - Reviewed                    ----------------------------------------------------------------------------------------  PHYSICAL EXAM  Constitutional - NAD, Comfortable  HEENT - NCAT, EOMI  Neck - Supple, No limited ROM  Chest - Breathing comfortably, No wheezing  Cardiovascular - S1S2  Abdomen - Soft   Extremities - No C/C/E, No calf tenderness, Left UE sling  Neurologic Exam -                    Cognitive - Awake, Alert, AAO to self, Part of situation - accident/shoulder problem (accuracy improved with yes/no)      Communication - Fluent, +dysarthria     Cranial Nerves - CN 2-12 intact     Motor -                     LEFT    UE - ShAB -/5, EF 3/5, EE 3/5,  5/5                    RIGHT UE - ShAB 5/5, EF 5/5, EE 5/5,  5/5                    LEFT    LE - HF 3/5, KE 5/5, DF 5/5, PF 5/5                    RIGHT LE - HF 3/5, KE 5/5, DF 5/5, PF 5/5        Sensory - Intact to LT     Reflexes - DTR Intact, No primitive reflexive     Coordination - FTN impaired on right (NT on left)  Psychiatric - Mood stable, Affect Flat  ----------------------------------------------------------------------------------------  ASSESSMENT/PLAN  70yMale with functional deficits after developing PNA and encephalopathy related to COVID  COVID 3/20 + - s/p ABX, S/p extubation   Seizures - Likely related to acute illness, Depakote, FU neurology outpatient  Encephalopathy - Recommend Decreased Zyprexa to 5mg Q8PM, Recommend Melatonin 5mg Q8PM, Recommend Amantadine 100mg Q6AM/12PM  Cardiac - Recommend PO alternate to Labetalol IV  Left humeral Fracture - Tejinder, NWB, FU Ortho Outpatient   Pain - Tylenol, Percocet  DVT PPX - SCDs, Lovenox  Rehab - Continue to recommend ACUTE inpatient rehabilitation for the functional deficits consisting of 3 hours of therapy/day & 24 hour RN/daily PMR physician for comorbid medical management. Will continue to follow for ongoing rehab needs and recommendations. Patient will be able to tolerate 3 hours a day.    Continue bedside therapy as well as OOB throughout the day with mobilization throughout the day with staff to maintain cardiopulmonary function and prevention of secondary complications related to debility.      At the time of this notation, as per administration at [facility], admission to Acute Inpatient Rehabilitation Facility with +COVID needs to have the following requirements:    1. Patient must be at least 7 days post COVID+ testing - POSITIVE 2nd 4/3  2. Patient must be fever free without use of antipyretic for at least 72 hours - DONE  3. Patient must have a REPEAT COVID NEGATIVE test completed. - POSITIVE 4/3  4. Final determination of acceptance to  [facility] will be made by [place] Administration and Medical Directors - PENDING    Please understand that the above requirements may change and will be updated.     Discussed with rehab liaison.

## 2020-04-06 NOTE — PROGRESS NOTE ADULT - ASSESSMENT
70 yr male presents with Generalized weakness and AMS and was noted to have with fever, seizure, encephalopathy and resp failure secondary to COVID 19. In the ED, pt with witnessed seizure complicated by aspiration prompting intubation, patient was management in the ICU, antibiotics, vent support and Seizure meds, later on extubated and was transferred out of the ICU, his respiratory failure resolved and continued to have AMS and s/p LP and CSF fluid was unremarkable, patient was seen and followed by Neurology, and was continued with Depakote, MRI brain is unremarkable, No further seizure activity during rest of the course of hospitalization, as per neuro follow up with them in the office as out patient, his Encephalopathy was likely secondary to seziure/infection/metabolic derangements now resolved, he has been AOX3, he was seen and followed by Speech and swallow team and has been given diet as recommended.      Patient was noted to have left arm swelling, US of the arm done showed No evidence of left upper extremity deep venous thrombosis. The left cephalic vein and left ulnar vein are not visualized, There is a complex fluid collection medial aspect of the left shoulder measuring 3.7 x 1.6 x 2.0 cm, patient was unable to move his shoulder, xray and CT showing displaced left proximal humeral neck fracture, patient was seen and followed by Ortho, Recommended arm Sling, Finger ROM to help resolution of forearm and hand edema, F/U with Dr Barr as outpt   Patient left arm swelling improved significantly and his pain was managed with pain meds.   Patient was seen by PT / PM&R complete, plan for acute rehab. PT IS MEDICALLY STABLE FOR DISCHARGE. DISCUSSED WITH [facility], PT REQUIRES NEGATIVE COVID TEST AND TO BE AFEBRILE FOR 72 HOURS. Repeat COVID testing positive, no new fevers since 4/1.      Plan:     1. Pneumonia, viral:  - Resolved   - O2Sat 94% on RA.      2. Acute respiratory failure with hypoxia:   - Resolved  - see above     3. Seizure  - No new seizure activity   - Unclear etiology  - MRI brain is unremarkable  - LP negative  -  Seen by Neuro,  recommend continue depakate, o/p follow up, seizure protocol.     4.  Essential hypertension  - Metoprolol 50mg po BID. BP has been stable     5.  Encephalopathy acute, likely acute metabolic encephalopathy  - Resolved, at baseline of A&Ox3  - Unclear etiology, possibly secondary to seziure/infection/metabolic derangements now resolved  - MRI brain negative, LP negative  - C/w ativan, Zyprexa, Thiamine, depakote  - Speech and swallow team is following has been started diet as recommended.    6. Acute pain of left shoulder  - Controlled with oxycodone PRN q6hr for severe pain   -  Shoulder xray and CT showing displaced left proximal humeral neck fracture,  - Seen by Ortho. Recommended arm Sling, Finger ROM to help resolution of forearm and hand edema.  -  outpatient F/U with Dr Barr     DISPOSITION:  complete, plan for acute rehab. PT IS MEDICALLY STABLE FOR DISCHARGE. DISCUSSED WITH CCM, PT REQUIRES NEGATIVE COVID TEST AND TO BE AFEBRILE FOR 72 HOURS. Repeat Covid test negative. Repeat COVID test ordered.  Last spiked temp 4/1.

## 2020-04-06 NOTE — CHART NOTE - NSCHARTNOTEFT_GEN_A_CORE
Source: Patient [ ]  Family [ ]   other [x] EMR    Current Diet: Diet, Dysphagia 1 Pureed-Honey Consistency Fluid (03-28-20 @ 11:41)    Patient reports [ ] nausea  [ ] vomiting [ ] diarrhea [ ] constipation  [ ]chewing problems [ ] swallowing issues  [ ] other:     PO intake:  < 50% [ ]   50-75%  [x]   %  [ ]  other :    Source for PO intake [ ] Patient [ ] family [x] chart [ ] staff [ ] other    Current Weight:   (3/28)   210.5 lbs  (3/26)   220.4 lbs    % Weight Change: Question accuracy of weights, will continue to monitor     Pertinent Medications: MEDICATIONS  (STANDING):  ascorbic acid 500 milliGRAM(s) Oral daily  chlorhexidine 0.12% Liquid 15 milliLiter(s) Oral Mucosa two times a day  chlorhexidine 4% Liquid 1 Application(s) Topical <User Schedule>  diVALproex  milliGRAM(s) Oral three times a day  enoxaparin Injectable 40 milliGRAM(s) SubCutaneous daily  folic acid 1 milliGRAM(s) Oral daily  metoprolol tartrate 50 milliGRAM(s) Oral two times a day  multivitamin 1 Tablet(s) Oral daily  OLANZapine Disintegrating Tablet 5 milliGRAM(s) Oral two times a day    MEDICATIONS  (PRN):  acetaminophen    Suspension .. 650 milliGRAM(s) Oral every 6 hours PRN Temp greater or equal to 38.5C (101.3F), Mild Pain (1 - 3)  labetalol Injectable 10 milliGRAM(s) IV Push every 4 hours PRN Systolic blood pressure > 160  oxycodone    5 mG/acetaminophen 325 mG 1 Tablet(s) Oral every 6 hours PRN Severe Pain (7 - 10)    Pertinent Labs: 04-06 Nan/a   Glu n/a   K+ n/a   Cr  n/a   BUN n/a   Phos n/a   Alb 3.0 g/dL<L> PAB n/a       Skin: L. upper hand blister, L. medial wrist blister    Nutrition focused physical exam not conducted - found signs of malnutrition [ ]absent [ ]present    Subcutaneous fat loss: [ ] Orbital fat pads region, [ ]Buccal fat region, [ ]Triceps region,  [ ]Ribs region    Muscle wasting: [ ]Temples region, [ ]Clavicle region, [ ]Shoulder region, [ ]Scapula region, [ ]Interosseous region,  [ ]thigh region, [ ]Calf region    Estimated Needs:   [x] no change since previous assessment  [ ] recalculated:     Current Nutrition Diagnosis: Pt remains at high nutrition risk secondary to increased nutrient needs related to increased physiological demand for nutrient as evidenced by acute hypoxic respiratory failure (COVID 19 positive) and pneumonia likely secondary to covid 19. Pt s/p SLP eval with diet advancement to puree/honey 3/28. Pt completing % of meals per documentation.     Recommendations:   1) Recommend add Ensure Enlive TID  2) Continue MVI, thiamine, folic acid and vit C daily   3) Monitor weights as feasible   4) Provide encouragement/assistance as needed during mealtimes to inc PO     Monitoring and Evaluation:   [x] PO intake [x] Tolerance to diet prescription [X] Weights  [X] Follow up per protocol [X] Labs:

## 2020-04-06 NOTE — PROGRESS NOTE ADULT - ATTENDING COMMENTS
I have personally seen and examined patient.  Above note reviewed and discussed with PA, modified where appropriate. Pt doing well on RA. Repeat COVID +ve . Pt will be d/peter to [facility] in am (instead of acute rehab) as discussed with CM.

## 2020-04-07 ENCOUNTER — TRANSCRIPTION ENCOUNTER (OUTPATIENT)
Age: 70
End: 2020-04-07

## 2020-04-07 VITALS
SYSTOLIC BLOOD PRESSURE: 132 MMHG | RESPIRATION RATE: 18 BRPM | TEMPERATURE: 98 F | DIASTOLIC BLOOD PRESSURE: 83 MMHG | OXYGEN SATURATION: 100 % | HEART RATE: 102 BPM

## 2020-04-07 LAB
ALBUMIN SERPL ELPH-MCNC: 2.7 G/DL — LOW (ref 3.3–5.2)
ALBUMIN SERPL ELPH-MCNC: 2.7 G/DL — LOW (ref 3.3–5.2)
ALP SERPL-CCNC: 130 U/L — HIGH (ref 40–120)
ALP SERPL-CCNC: 159 U/L — HIGH (ref 40–120)
ALT FLD-CCNC: 124 U/L — HIGH
ALT FLD-CCNC: 27 U/L — SIGNIFICANT CHANGE UP
ANION GAP SERPL CALC-SCNC: 11 MMOL/L — SIGNIFICANT CHANGE UP (ref 5–17)
AST SERPL-CCNC: 37 U/L — SIGNIFICANT CHANGE UP
AST SERPL-CCNC: 72 U/L — HIGH
BILIRUB DIRECT SERPL-MCNC: 0.1 MG/DL — SIGNIFICANT CHANGE UP (ref 0–0.3)
BILIRUB INDIRECT FLD-MCNC: 0.4 MG/DL — SIGNIFICANT CHANGE UP (ref 0.2–1)
BILIRUB SERPL-MCNC: 0.5 MG/DL — SIGNIFICANT CHANGE UP (ref 0.4–2)
BILIRUB SERPL-MCNC: 0.5 MG/DL — SIGNIFICANT CHANGE UP (ref 0.4–2)
BUN SERPL-MCNC: 69 MG/DL — HIGH (ref 8–20)
CALCIUM SERPL-MCNC: 7.9 MG/DL — LOW (ref 8.6–10.2)
CHLORIDE SERPL-SCNC: 114 MMOL/L — HIGH (ref 98–107)
CO2 SERPL-SCNC: 27 MMOL/L — SIGNIFICANT CHANGE UP (ref 22–29)
CREAT SERPL-MCNC: 0.77 MG/DL — SIGNIFICANT CHANGE UP (ref 0.5–1.3)
GLUCOSE BLDC GLUCOMTR-MCNC: 131 MG/DL — HIGH (ref 70–99)
GLUCOSE BLDC GLUCOMTR-MCNC: 148 MG/DL — HIGH (ref 70–99)
GLUCOSE SERPL-MCNC: 110 MG/DL — HIGH (ref 70–99)
MAGNESIUM SERPL-MCNC: 3.1 MG/DL — HIGH (ref 1.6–2.6)
PHOSPHATE SERPL-MCNC: 3.2 MG/DL — SIGNIFICANT CHANGE UP (ref 2.4–4.7)
POTASSIUM SERPL-MCNC: 5.1 MMOL/L — SIGNIFICANT CHANGE UP (ref 3.5–5.3)
POTASSIUM SERPL-SCNC: 5.1 MMOL/L — SIGNIFICANT CHANGE UP (ref 3.5–5.3)
PROT SERPL-MCNC: 5.6 G/DL — LOW (ref 6.6–8.7)
PROT SERPL-MCNC: 7.3 G/DL — SIGNIFICANT CHANGE UP (ref 6.6–8.7)
SODIUM SERPL-SCNC: 152 MMOL/L — HIGH (ref 135–145)

## 2020-04-07 PROCEDURE — 87205 SMEAR GRAM STAIN: CPT

## 2020-04-07 PROCEDURE — 87070 CULTURE OTHR SPECIMN AEROBIC: CPT

## 2020-04-07 PROCEDURE — 82330 ASSAY OF CALCIUM: CPT

## 2020-04-07 PROCEDURE — 83605 ASSAY OF LACTIC ACID: CPT

## 2020-04-07 PROCEDURE — 87102 FUNGUS ISOLATION CULTURE: CPT

## 2020-04-07 PROCEDURE — 84295 ASSAY OF SERUM SODIUM: CPT

## 2020-04-07 PROCEDURE — 95707 EEG W/O VID 2-12HR CONT MNTR: CPT

## 2020-04-07 PROCEDURE — 84157 ASSAY OF PROTEIN OTHER: CPT

## 2020-04-07 PROCEDURE — 85610 PROTHROMBIN TIME: CPT

## 2020-04-07 PROCEDURE — 70496 CT ANGIOGRAPHY HEAD: CPT

## 2020-04-07 PROCEDURE — 99233 SBSQ HOSP IP/OBS HIGH 50: CPT

## 2020-04-07 PROCEDURE — 84132 ASSAY OF SERUM POTASSIUM: CPT

## 2020-04-07 PROCEDURE — 82248 BILIRUBIN DIRECT: CPT

## 2020-04-07 PROCEDURE — 84145 PROCALCITONIN (PCT): CPT

## 2020-04-07 PROCEDURE — 70498 CT ANGIOGRAPHY NECK: CPT

## 2020-04-07 PROCEDURE — 85027 COMPLETE CBC AUTOMATED: CPT

## 2020-04-07 PROCEDURE — 82435 ASSAY OF BLOOD CHLORIDE: CPT

## 2020-04-07 PROCEDURE — 87635 SARS-COV-2 COVID-19 AMP PRB: CPT

## 2020-04-07 PROCEDURE — 83735 ASSAY OF MAGNESIUM: CPT

## 2020-04-07 PROCEDURE — 73060 X-RAY EXAM OF HUMERUS: CPT

## 2020-04-07 PROCEDURE — 87186 SC STD MICRODIL/AGAR DIL: CPT

## 2020-04-07 PROCEDURE — 94002 VENT MGMT INPAT INIT DAY: CPT

## 2020-04-07 PROCEDURE — 70551 MRI BRAIN STEM W/O DYE: CPT

## 2020-04-07 PROCEDURE — U0001: CPT

## 2020-04-07 PROCEDURE — 86140 C-REACTIVE PROTEIN: CPT

## 2020-04-07 PROCEDURE — 71250 CT THORAX DX C-: CPT

## 2020-04-07 PROCEDURE — 87581 M.PNEUMON DNA AMP PROBE: CPT

## 2020-04-07 PROCEDURE — 97163 PT EVAL HIGH COMPLEX 45 MIN: CPT

## 2020-04-07 PROCEDURE — 88184 FLOWCYTOMETRY/ TC 1 MARKER: CPT

## 2020-04-07 PROCEDURE — 86803 HEPATITIS C AB TEST: CPT

## 2020-04-07 PROCEDURE — 85379 FIBRIN DEGRADATION QUANT: CPT

## 2020-04-07 PROCEDURE — 82945 GLUCOSE OTHER FLUID: CPT

## 2020-04-07 PROCEDURE — 85652 RBC SED RATE AUTOMATED: CPT

## 2020-04-07 PROCEDURE — 93971 EXTREMITY STUDY: CPT

## 2020-04-07 PROCEDURE — 84100 ASSAY OF PHOSPHORUS: CPT

## 2020-04-07 PROCEDURE — 85730 THROMBOPLASTIN TIME PARTIAL: CPT

## 2020-04-07 PROCEDURE — 82803 BLOOD GASES ANY COMBINATION: CPT

## 2020-04-07 PROCEDURE — 84484 ASSAY OF TROPONIN QUANT: CPT

## 2020-04-07 PROCEDURE — 96376 TX/PRO/DX INJ SAME DRUG ADON: CPT | Mod: XU

## 2020-04-07 PROCEDURE — 96375 TX/PRO/DX INJ NEW DRUG ADDON: CPT | Mod: XU

## 2020-04-07 PROCEDURE — 71045 X-RAY EXAM CHEST 1 VIEW: CPT

## 2020-04-07 PROCEDURE — 80076 HEPATIC FUNCTION PANEL: CPT

## 2020-04-07 PROCEDURE — 94760 N-INVAS EAR/PLS OXIMETRY 1: CPT

## 2020-04-07 PROCEDURE — 87483 CNS DNA AMP PROBE TYPE 12-25: CPT

## 2020-04-07 PROCEDURE — 83615 LACTATE (LD) (LDH) ENZYME: CPT

## 2020-04-07 PROCEDURE — 87798 DETECT AGENT NOS DNA AMP: CPT

## 2020-04-07 PROCEDURE — 36415 COLL VENOUS BLD VENIPUNCTURE: CPT

## 2020-04-07 PROCEDURE — 73090 X-RAY EXAM OF FOREARM: CPT

## 2020-04-07 PROCEDURE — 85014 HEMATOCRIT: CPT

## 2020-04-07 PROCEDURE — 87116 MYCOBACTERIA CULTURE: CPT

## 2020-04-07 PROCEDURE — 73200 CT UPPER EXTREMITY W/O DYE: CPT

## 2020-04-07 PROCEDURE — 96374 THER/PROPH/DIAG INJ IV PUSH: CPT | Mod: XU

## 2020-04-07 PROCEDURE — 97530 THERAPEUTIC ACTIVITIES: CPT

## 2020-04-07 PROCEDURE — 97116 GAIT TRAINING THERAPY: CPT

## 2020-04-07 PROCEDURE — 99291 CRITICAL CARE FIRST HOUR: CPT | Mod: 25

## 2020-04-07 PROCEDURE — 87529 HSV DNA AMP PROBE: CPT

## 2020-04-07 PROCEDURE — 87486 CHLMYD PNEUM DNA AMP PROBE: CPT

## 2020-04-07 PROCEDURE — 82947 ASSAY GLUCOSE BLOOD QUANT: CPT

## 2020-04-07 PROCEDURE — 81001 URINALYSIS AUTO W/SCOPE: CPT

## 2020-04-07 PROCEDURE — 82962 GLUCOSE BLOOD TEST: CPT

## 2020-04-07 PROCEDURE — 80307 DRUG TEST PRSMV CHEM ANLYZR: CPT

## 2020-04-07 PROCEDURE — 99239 HOSP IP/OBS DSCHRG MGMT >30: CPT

## 2020-04-07 PROCEDURE — 92610 EVALUATE SWALLOWING FUNCTION: CPT

## 2020-04-07 PROCEDURE — 87150 DNA/RNA AMPLIFIED PROBE: CPT

## 2020-04-07 PROCEDURE — 85384 FIBRINOGEN ACTIVITY: CPT

## 2020-04-07 PROCEDURE — 87633 RESP VIRUS 12-25 TARGETS: CPT

## 2020-04-07 PROCEDURE — 70450 CT HEAD/BRAIN W/O DYE: CPT

## 2020-04-07 PROCEDURE — 73030 X-RAY EXAM OF SHOULDER: CPT

## 2020-04-07 PROCEDURE — 82728 ASSAY OF FERRITIN: CPT

## 2020-04-07 PROCEDURE — 87040 BLOOD CULTURE FOR BACTERIA: CPT

## 2020-04-07 PROCEDURE — 80053 COMPREHEN METABOLIC PANEL: CPT

## 2020-04-07 PROCEDURE — 89051 BODY FLUID CELL COUNT: CPT

## 2020-04-07 PROCEDURE — 88185 FLOWCYTOMETRY/TC ADD-ON: CPT

## 2020-04-07 PROCEDURE — 80048 BASIC METABOLIC PNL TOTAL CA: CPT

## 2020-04-07 PROCEDURE — P9047: CPT

## 2020-04-07 PROCEDURE — 94003 VENT MGMT INPAT SUBQ DAY: CPT

## 2020-04-07 PROCEDURE — 93005 ELECTROCARDIOGRAM TRACING: CPT

## 2020-04-07 RX ORDER — DIVALPROEX SODIUM 500 MG/1
1 TABLET, DELAYED RELEASE ORAL
Qty: 0 | Refills: 0 | DISCHARGE
Start: 2020-04-07

## 2020-04-07 RX ORDER — METOPROLOL TARTRATE 50 MG
1 TABLET ORAL
Qty: 0 | Refills: 0 | DISCHARGE
Start: 2020-04-07

## 2020-04-07 RX ORDER — OLANZAPINE 15 MG/1
1 TABLET, FILM COATED ORAL
Qty: 0 | Refills: 0 | DISCHARGE
Start: 2020-04-07

## 2020-04-07 RX ORDER — ACETAMINOPHEN 500 MG
20.31 TABLET ORAL
Qty: 0 | Refills: 0 | DISCHARGE
Start: 2020-04-07

## 2020-04-07 RX ORDER — ASCORBIC ACID 60 MG
1 TABLET,CHEWABLE ORAL
Qty: 0 | Refills: 0 | DISCHARGE
Start: 2020-04-07

## 2020-04-07 RX ORDER — FOLIC ACID 0.8 MG
1 TABLET ORAL
Qty: 0 | Refills: 0 | DISCHARGE
Start: 2020-04-07

## 2020-04-07 RX ADMIN — CHLORHEXIDINE GLUCONATE 15 MILLILITER(S): 213 SOLUTION TOPICAL at 04:59

## 2020-04-07 RX ADMIN — CHLORHEXIDINE GLUCONATE 15 MILLILITER(S): 213 SOLUTION TOPICAL at 17:15

## 2020-04-07 RX ADMIN — ENOXAPARIN SODIUM 40 MILLIGRAM(S): 100 INJECTION SUBCUTANEOUS at 10:13

## 2020-04-07 RX ADMIN — OLANZAPINE 5 MILLIGRAM(S): 15 TABLET, FILM COATED ORAL at 17:17

## 2020-04-07 RX ADMIN — Medication 50 MILLIGRAM(S): at 17:17

## 2020-04-07 RX ADMIN — Medication 1 TABLET(S): at 10:13

## 2020-04-07 RX ADMIN — Medication 1 MILLIGRAM(S): at 10:13

## 2020-04-07 RX ADMIN — Medication 50 MILLIGRAM(S): at 04:59

## 2020-04-07 RX ADMIN — CHLORHEXIDINE GLUCONATE 1 APPLICATION(S): 213 SOLUTION TOPICAL at 10:09

## 2020-04-07 RX ADMIN — DIVALPROEX SODIUM 250 MILLIGRAM(S): 500 TABLET, DELAYED RELEASE ORAL at 04:59

## 2020-04-07 RX ADMIN — Medication 500 MILLIGRAM(S): at 10:14

## 2020-04-07 RX ADMIN — DIVALPROEX SODIUM 250 MILLIGRAM(S): 500 TABLET, DELAYED RELEASE ORAL at 10:13

## 2020-04-07 RX ADMIN — OLANZAPINE 5 MILLIGRAM(S): 15 TABLET, FILM COATED ORAL at 10:13

## 2020-04-07 NOTE — DISCHARGE NOTE NURSING/CASE MANAGEMENT/SOCIAL WORK - PATIENT PORTAL LINK FT
You can access the FollowMyHealth Patient Portal offered by [facility] by registering at the following website: [email or web address]. By joining [employer]’s FollowMyHealth portal, you will also be able to view your health information using other applications (apps) compatible with our system.

## 2020-04-07 NOTE — PROGRESS NOTE ADULT - SUBJECTIVE AND OBJECTIVE BOX
CC: respiratory failure, seizure, aspiration pna (06 Apr 2020 10:20)    HPI:  Pt is a 70 YOM h/o HTN who presented to ED the day prior with dizziness and was discharged.  Pt represents to ED again with AMS, dizziness. Pt is poor historian.  He had a witnessed seizure in ER and received Ativan 4mg IVP.  In ED, pt had bitten his tongue and aspirated on blood.  He was hypoxic and required emergent intubation.    INTERVAL HPI/OVERNIGHT EVENTS: Patient seen and examined lying in bed.  Patient complaining of shoulder discomfort.  Patient denies any headache, dizziness, SOB, CP, abdominal pain, nausea, vomiting, dysuria.  Other ROS reviewed and are negative.    Vital Signs Last 24 Hrs  T(C): 36.8 (06 Apr 2020 07:39), Max: 36.8 (06 Apr 2020 06:33)  T(F): 98.2 (06 Apr 2020 07:39), Max: 98.3 (06 Apr 2020 06:33)  HR: 76 (06 Apr 2020 07:39) (76 - 94)  BP: 137/75 (06 Apr 2020 07:39) (137/75 - 157/81)  BP(mean): --  RR: 19 (06 Apr 2020 07:39) (17 - 20)  SpO2: 93% (06 Apr 2020 00:23) (92% - 93%)  I&O's Detail    PHYSICAL EXAM:  GENERAL: NAD  HEAD:  Atraumatic, Normocephalic  NECK: Supple, No JVD  NERVOUS SYSTEM:  Alert, generalized weakness  CHEST/LUNG: Clear to auscultation bilaterally; No rales, rhonchi, wheezing, or rubs  HEART: Regular rate and rhythm; No murmurs, rubs, or gallops  ABDOMEN: Soft, Nontender, Nondistended; Bowel sounds present  EXTREMITIES:  2+ Peripheral Pulses, No clubbing, cyanosis, or edema      TPro  7.7    /  Alb  3.0    /  TBili  0.6    /  DBili  0.2    /  AST  69     /  ALT  148    /  AlkPhos  177    06 Apr 2020 10:29      LIVER FUNCTIONS - ( 06 Apr 2020 10:29 )  Alb: 3.0 g/dL / Pro: 7.7 g/dL / ALK PHOS: 177 U/L / ALT: 148 U/L / AST: 69 U/L / GGT: x               MEDICATIONS  (STANDING):  ascorbic acid 500 milliGRAM(s) Oral daily  chlorhexidine 0.12% Liquid 15 milliLiter(s) Oral Mucosa two times a day  chlorhexidine 4% Liquid 1 Application(s) Topical <User Schedule>  diVALproex  milliGRAM(s) Oral three times a day  enoxaparin Injectable 40 milliGRAM(s) SubCutaneous daily  folic acid 1 milliGRAM(s) Oral daily  metoprolol tartrate 50 milliGRAM(s) Oral two times a day  multivitamin 1 Tablet(s) Oral daily  OLANZapine Disintegrating Tablet 5 milliGRAM(s) Oral two times a day    MEDICATIONS  (PRN):  acetaminophen    Suspension .. 650 milliGRAM(s) Oral every 6 hours PRN Temp greater or equal to 38.5C (101.3F), Mild Pain (1 - 3)  labetalol Injectable 10 milliGRAM(s) IV Push every 4 hours PRN Systolic blood pressure > 160  oxycodone    5 mG/acetaminophen 325 mG 1 Tablet(s) Oral every 6 hours PRN Severe Pain (7 - 10)

## 2020-04-07 NOTE — PROGRESS NOTE ADULT - NSHPATTENDINGPLANDISCUSS_GEN_ALL_CORE
Dr Black
Dr Pena
Dr Vance
Dr Vance
MICU Team
MICU team
Patient and RN
pt, PA, rn
pt, PA, rn
pt, PA< rn
Dr Clement
pt, PA, rn
patient, RN and PA
ICU team

## 2020-04-07 NOTE — PROGRESS NOTE ADULT - SUBJECTIVE AND OBJECTIVE BOX
No overnight events.    REVIEW OF SYSTEMS  Constitutional - No fever,  +fatigue  Neurological - +loss of strength    FUNCTIONAL PROGRESS  4/6  Bed Mobility  Bed Mobility Training Supine-to-Sit: minimum assist (75% patient effort);  nonverbal cues (demo/gestures)  Bed Mobility Training Limitations: decreased ability to use arms for pushing/pulling;  decreased strength;  impaired balance    Sit-Stand Transfer Training  Transfer Training Sit-to-Stand Transfer: minimum assist (75% patient effort);  1 person assist;  nonweight-bearing   LUE   hand held,no device  Transfer Training Stand-to-Sit Transfer: minimum assist (75% patient effort);  1 person assist;  nonweight-bearing   LUE   hand held,no device  Sit-to-Stand Transfer Training Transfer Safety Analysis: decreased balance;  decreased strength;  impaired balance    Gait Training  Gait Training: minimum assist (75% patient effort);  1 person assist;  nonweight-bearing   LUE   hand held,no device;  50 feet  Gait Analysis: 2-point gait   decreased arlette;  decreased step length;  decreased stride length;  impaired balance;  decreased strength;  50 feet;  no device,HHA          VITALS  T(C): 36.6 (04-07-20 @ 07:13), Max: 36.7 (04-06-20 @ 17:01)  HR: 102 (04-07-20 @ 07:13) (88 - 102)  BP: 132/82 (04-07-20 @ 07:13) (132/82 - 151/76)  RR: 19 (04-07-20 @ 07:13) (19 - 19)  SpO2: 100% (04-06-20 @ 21:49) (100% - 100%)  Wt(kg): --    MEDICATIONS   acetaminophen    Suspension .. 650 milliGRAM(s) every 6 hours PRN  ascorbic acid 500 milliGRAM(s) daily  chlorhexidine 0.12% Liquid 15 milliLiter(s) two times a day  chlorhexidine 4% Liquid 1 Application(s) <User Schedule>  diVALproex  milliGRAM(s) three times a day  enoxaparin Injectable 40 milliGRAM(s) daily  folic acid 1 milliGRAM(s) daily  labetalol Injectable 10 milliGRAM(s) every 4 hours PRN  metoprolol tartrate 50 milliGRAM(s) two times a day  multivitamin 1 Tablet(s) daily  OLANZapine Disintegrating Tablet 5 milliGRAM(s) two times a day  oxycodone    5 mG/acetaminophen 325 mG 1 Tablet(s) every 6 hours PRN      RECENT LABS - Reviewed        TPro  7.7  /  Alb  3.0<L>  /  TBili  0.6  /  DBili  0.2  /  AST  69<H>  /  ALT  148<H>  /  AlkPhos  177<H>  04-06                ----------------------------------------------------------------------------------------  PHYSICAL EXAM  Constitutional - NAD, Comfortable  HEENT - NCAT, EOMI  Neck - Supple, No limited ROM  Chest - Breathing comfortably, No wheezing  Cardiovascular - S1S2  Abdomen - Soft   Extremities - No C/C/E, No calf tenderness, Left UE sling  Neurologic Exam -                    Cognitive - Awake, Alert, AAO to self, Part of situation - accident/shoulder problem (accuracy improved with yes/no)      Communication - Fluent, +dysarthria     Cranial Nerves - CN 2-12 intact     Motor -                     LEFT    UE - ShAB -/5, EF 3/5, EE 3/5,  5/5                    RIGHT UE - ShAB 5/5, EF 5/5, EE 5/5,  5/5                    LEFT    LE - HF 3/5, KE 5/5, DF 5/5, PF 5/5                    RIGHT LE - HF 3/5, KE 5/5, DF 5/5, PF 5/5        Sensory - Intact to LT     Reflexes - DTR Intact, No primitive reflexive     Coordination - FTN impaired on right (NT on left)  Psychiatric - Mood stable, Affect Flat  ----------------------------------------------------------------------------------------  ASSESSMENT/PLAN  70yMale with functional deficits after developing PNA and encephalopathy related to COVID  COVID 3/20 + - s/p ABX, S/p extubation   Seizures - Likely related to acute illness, Depakote, [facility] neurology outpatient  Encephalopathy - Recommend Decreased Zyprexa to 5mg Q8PM, Recommend Melatonin 5mg Q8PM, Recommend Amantadine 100mg Q6AM/12PM  Cardiac - Recommend PO alternate to Labetalol IV  Left humeral Fracture - Sling, NWB, FU Ortho Outpatient   Pain - Tylenol, Percocet  DVT PPX - SCDs, Lovenox  Rehab - Continue to recommend ACUTE inpatient rehabilitation for the functional deficits consisting of 3 hours of therapy/day & 24 hour RN/daily PMR physician for comorbid medical management. Will continue to follow for ongoing rehab needs and recommendations. Patient will be able to tolerate 3 hours a day.    Continue bedside therapy as well as OOB throughout the day with mobilization throughout the day with staff to maintain cardiopulmonary function and prevention of secondary complications related to debility.      At the time of this notation, as per administration at [facility], admission to Acute Inpatient Rehabilitation Facility with +COVID needs to have the following requirements:    1. Patient must be at least 7 days post COVID+ testing - POSITIVE 2nd 4/3  2. Patient must be fever free without use of antipyretic for at least 72 hours - DONE  3. Patient must have a REPEAT COVID NEGATIVE test completed. - POSITIVE 4/3  4. Final determination of acceptance to  [facility] will be made by [facility] Administration and Medical Directors - PENDING    Please understand that the above requirements may change and will be updated.     Discussed with rehab liaison.

## 2020-04-07 NOTE — PROGRESS NOTE ADULT - ASSESSMENT
70 yr male presents with Generalized weakness and AMS and was noted to have with fever, seizure, encephalopathy and resp failure secondary to COVID 19. In the ED, pt with witnessed seizure complicated by aspiration prompting intubation, patient was management in the ICU, antibiotics, vent support and Seizure meds, later on extubated and was transferred out of the ICU, his respiratory failure resolved and continued to have AMS and s/p LP and CSF fluid was unremarkable, patient was seen and followed by Neurology, and was continued with Depakote, MRI brain is unremarkable, No further seizure activity during rest of the course of hospitalization, as per neuro follow up with them in the office as out patient, his Encephalopathy was likely secondary to seziure/infection/metabolic derangements now resolved, he has been AOX3, he was seen and followed by Speech and swallow team and has been given diet as recommended.      Patient was noted to have left arm swelling, US of the arm done showed No evidence of left upper extremity deep venous thrombosis. The left cephalic vein and left ulnar vein are not visualized, There is a complex fluid collection medial aspect of the left shoulder measuring 3.7 x 1.6 x 2.0 cm, patient was unable to move his shoulder, xray and CT showing displaced left proximal humeral neck fracture, patient was seen and followed by Ortho, Recommended arm Sling, Finger ROM to help resolution of forearm and hand edema, F/U with Dr Barr as outpt   Patient left arm swelling improved significantly and his pain was managed with pain meds.   Patient was seen by PT / PM&R complete, plan for acute rehab. PT IS MEDICALLY STABLE FOR DISCHARGE. DISCUSSED WITH [facility], PT REQUIRES NEGATIVE COVID TEST AND TO BE AFEBRILE FOR 72 HOURS. Repeat COVID testing positive, no new fevers since 4/1.      Plan:     1. Pneumonia, viral:  - Resolved   - O2Sat 94% on RA.      2. Acute respiratory failure with hypoxia:   - Resolved  - see above     3. Seizure  - No new seizure activity   - Unclear etiology  - MRI brain is unremarkable  - LP negative  -  Seen by Neuro,  recommend continue depakate, o/p follow up, seizure protocol.     4.  Essential hypertension  - Metoprolol 50mg po BID. BP has been stable     5.  Encephalopathy acute, likely acute metabolic encephalopathy  - Resolved, at baseline of A&Ox3  - Unclear etiology, possibly secondary to seziure/infection/metabolic derangements now resolved  - MRI brain negative, LP negative  - C/w ativan, Zyprexa, Thiamine, depakote  - Speech and swallow team is following has been started diet as recommended.    6. Acute pain of left shoulder  - Controlled with oxycodone PRN q6hr for severe pain   -  Shoulder xray and CT showing displaced left proximal humeral neck fracture,  - Seen by Ortho. Recommended arm Sling, Finger ROM to help resolution of forearm and hand edema.  -  outpatient F/U with Dr Barr     DISPOSITION:  complete, plan for acute rehab. PT IS MEDICALLY STABLE FOR DISCHARGE. Repeat COVID +ve . Pt will be d/peter to [facility] in am (instead of acute rehab) as discussed with CM. Awaiting auth

## 2020-04-07 NOTE — PROGRESS NOTE ADULT - REASON FOR ADMISSION
respiratory failure, seizure, aspiration pna

## 2020-04-22 LAB
CULTURE RESULTS: SIGNIFICANT CHANGE UP
SPECIMEN SOURCE: SIGNIFICANT CHANGE UP

## 2020-04-30 PROBLEM — Z00.00 ENCOUNTER FOR PREVENTIVE HEALTH EXAMINATION: Status: ACTIVE | Noted: 2020-04-30

## 2020-05-13 LAB
CULTURE RESULTS: SIGNIFICANT CHANGE UP
SPECIMEN SOURCE: SIGNIFICANT CHANGE UP

## 2020-06-05 ENCOUNTER — APPOINTMENT (OUTPATIENT)
Dept: ORTHOPEDIC SURGERY | Facility: CLINIC | Age: 70
End: 2020-06-05
Payer: COMMERCIAL

## 2020-06-05 VITALS
HEART RATE: 86 BPM | SYSTOLIC BLOOD PRESSURE: 154 MMHG | BODY MASS INDEX: 27.46 KG/M2 | DIASTOLIC BLOOD PRESSURE: 98 MMHG | TEMPERATURE: 98.1 F | HEIGHT: 74 IN | WEIGHT: 214 LBS

## 2020-06-05 PROCEDURE — 73030 X-RAY EXAM OF SHOULDER: CPT | Mod: RT

## 2020-06-05 PROCEDURE — 99024 POSTOP FOLLOW-UP VISIT: CPT

## 2020-06-15 ENCOUNTER — APPOINTMENT (OUTPATIENT)
Dept: MRI IMAGING | Facility: CLINIC | Age: 70
End: 2020-06-15

## 2020-06-15 ENCOUNTER — OUTPATIENT (OUTPATIENT)
Dept: OUTPATIENT SERVICES | Facility: HOSPITAL | Age: 70
LOS: 1 days | End: 2020-06-15

## 2020-06-15 DIAGNOSIS — Z00.8 ENCOUNTER FOR OTHER GENERAL EXAMINATION: ICD-10-CM

## 2020-06-15 DIAGNOSIS — M24.811 OTHER SPECIFIC JOINT DERANGEMENTS OF RIGHT SHOULDER, NOT ELSEWHERE CLASSIFIED: ICD-10-CM

## 2020-06-16 ENCOUNTER — NON-APPOINTMENT (OUTPATIENT)
Age: 70
End: 2020-06-16

## 2020-06-26 ENCOUNTER — NON-APPOINTMENT (OUTPATIENT)
Age: 70
End: 2020-06-26

## 2020-08-12 ENCOUNTER — OUTPATIENT (OUTPATIENT)
Dept: OUTPATIENT SERVICES | Facility: HOSPITAL | Age: 70
LOS: 1 days | End: 2020-08-12
Payer: COMMERCIAL

## 2020-08-12 VITALS
HEIGHT: 74 IN | TEMPERATURE: 98 F | SYSTOLIC BLOOD PRESSURE: 175 MMHG | OXYGEN SATURATION: 100 % | WEIGHT: 195.99 LBS | DIASTOLIC BLOOD PRESSURE: 76 MMHG | RESPIRATION RATE: 16 BRPM | HEART RATE: 54 BPM

## 2020-08-12 DIAGNOSIS — Z98.890 OTHER SPECIFIED POSTPROCEDURAL STATES: Chronic | ICD-10-CM

## 2020-08-12 DIAGNOSIS — Z01.818 ENCOUNTER FOR OTHER PREPROCEDURAL EXAMINATION: ICD-10-CM

## 2020-08-12 DIAGNOSIS — M75.52 BURSITIS OF LEFT SHOULDER: ICD-10-CM

## 2020-08-12 LAB
A1C WITH ESTIMATED AVERAGE GLUCOSE RESULT: 5.6 % — SIGNIFICANT CHANGE UP (ref 4–5.6)
ANION GAP SERPL CALC-SCNC: 3 MMOL/L — LOW (ref 5–17)
APTT BLD: 32.6 SEC — SIGNIFICANT CHANGE UP (ref 27.5–35.5)
BASOPHILS # BLD AUTO: 0.01 K/UL — SIGNIFICANT CHANGE UP (ref 0–0.2)
BASOPHILS NFR BLD AUTO: 0.2 % — SIGNIFICANT CHANGE UP (ref 0–2)
BUN SERPL-MCNC: 13 MG/DL — SIGNIFICANT CHANGE UP (ref 7–23)
CALCIUM SERPL-MCNC: 8.3 MG/DL — LOW (ref 8.5–10.1)
CHLORIDE SERPL-SCNC: 109 MMOL/L — HIGH (ref 96–108)
CO2 SERPL-SCNC: 27 MMOL/L — SIGNIFICANT CHANGE UP (ref 22–31)
CREAT SERPL-MCNC: 1.15 MG/DL — SIGNIFICANT CHANGE UP (ref 0.5–1.3)
EOSINOPHIL # BLD AUTO: 0.1 K/UL — SIGNIFICANT CHANGE UP (ref 0–0.5)
EOSINOPHIL NFR BLD AUTO: 1.9 % — SIGNIFICANT CHANGE UP (ref 0–6)
ESTIMATED AVERAGE GLUCOSE: 114 MG/DL — SIGNIFICANT CHANGE UP (ref 68–114)
GLUCOSE SERPL-MCNC: 99 MG/DL — SIGNIFICANT CHANGE UP (ref 70–99)
HCT VFR BLD CALC: 34.8 % — LOW (ref 39–50)
HGB BLD-MCNC: 11.4 G/DL — LOW (ref 13–17)
IMM GRANULOCYTES NFR BLD AUTO: 0.2 % — SIGNIFICANT CHANGE UP (ref 0–1.5)
INR BLD: 1.15 RATIO — SIGNIFICANT CHANGE UP (ref 0.88–1.16)
LYMPHOCYTES # BLD AUTO: 1.54 K/UL — SIGNIFICANT CHANGE UP (ref 1–3.3)
LYMPHOCYTES # BLD AUTO: 28.7 % — SIGNIFICANT CHANGE UP (ref 13–44)
MCHC RBC-ENTMCNC: 27.9 PG — SIGNIFICANT CHANGE UP (ref 27–34)
MCHC RBC-ENTMCNC: 32.8 GM/DL — SIGNIFICANT CHANGE UP (ref 32–36)
MCV RBC AUTO: 85.3 FL — SIGNIFICANT CHANGE UP (ref 80–100)
MONOCYTES # BLD AUTO: 0.63 K/UL — SIGNIFICANT CHANGE UP (ref 0–0.9)
MONOCYTES NFR BLD AUTO: 11.7 % — SIGNIFICANT CHANGE UP (ref 2–14)
MRSA PCR RESULT.: SIGNIFICANT CHANGE UP
NEUTROPHILS # BLD AUTO: 3.08 K/UL — SIGNIFICANT CHANGE UP (ref 1.8–7.4)
NEUTROPHILS NFR BLD AUTO: 57.3 % — SIGNIFICANT CHANGE UP (ref 43–77)
PLATELET # BLD AUTO: 190 K/UL — SIGNIFICANT CHANGE UP (ref 150–400)
POTASSIUM SERPL-MCNC: 4.2 MMOL/L — SIGNIFICANT CHANGE UP (ref 3.5–5.3)
POTASSIUM SERPL-SCNC: 4.2 MMOL/L — SIGNIFICANT CHANGE UP (ref 3.5–5.3)
PROTHROM AB SERPL-ACNC: 13.4 SEC — SIGNIFICANT CHANGE UP (ref 10.6–13.6)
RBC # BLD: 4.08 M/UL — LOW (ref 4.2–5.8)
RBC # FLD: 15.3 % — HIGH (ref 10.3–14.5)
S AUREUS DNA NOSE QL NAA+PROBE: SIGNIFICANT CHANGE UP
SODIUM SERPL-SCNC: 139 MMOL/L — SIGNIFICANT CHANGE UP (ref 135–145)
WBC # BLD: 5.37 K/UL — SIGNIFICANT CHANGE UP (ref 3.8–10.5)
WBC # FLD AUTO: 5.37 K/UL — SIGNIFICANT CHANGE UP (ref 3.8–10.5)

## 2020-08-12 PROCEDURE — 83036 HEMOGLOBIN GLYCOSYLATED A1C: CPT

## 2020-08-12 PROCEDURE — 85025 COMPLETE CBC W/AUTO DIFF WBC: CPT

## 2020-08-12 PROCEDURE — 85730 THROMBOPLASTIN TIME PARTIAL: CPT

## 2020-08-12 PROCEDURE — 80048 BASIC METABOLIC PNL TOTAL CA: CPT

## 2020-08-12 PROCEDURE — 86900 BLOOD TYPING SEROLOGIC ABO: CPT

## 2020-08-12 PROCEDURE — 93010 ELECTROCARDIOGRAM REPORT: CPT

## 2020-08-12 PROCEDURE — 86850 RBC ANTIBODY SCREEN: CPT

## 2020-08-12 PROCEDURE — G0463: CPT | Mod: 25

## 2020-08-12 PROCEDURE — 93005 ELECTROCARDIOGRAM TRACING: CPT

## 2020-08-12 PROCEDURE — 87640 STAPH A DNA AMP PROBE: CPT

## 2020-08-12 PROCEDURE — 36415 COLL VENOUS BLD VENIPUNCTURE: CPT

## 2020-08-12 PROCEDURE — 86901 BLOOD TYPING SEROLOGIC RH(D): CPT

## 2020-08-12 PROCEDURE — 85610 PROTHROMBIN TIME: CPT

## 2020-08-12 PROCEDURE — 87641 MR-STAPH DNA AMP PROBE: CPT

## 2020-08-12 NOTE — ED STATDOCS - CARE PLAN
Henrique Valencia given for the high potassium. Per Nephrology- place aaron for strict I&O's. Principal Discharge DX:	Allergic reaction, initial encounter

## 2020-08-12 NOTE — H&P PST ADULT - ASSESSMENT
70 male presents to PST for planned reserve left shoulder arthroplasty    Plan:  1. PST instructions given ; NPO status instructions to be given by ASU   2. Pt instructed to take following meds with sip of water :   3. Pt instructed to take routine evening medications unless indicated   4. Stop NSAIDS ( Aspirin Alev Motrin Mobic Diclofenac), herbal supplements , MVI , Vitamin fish oil 7 days prior to surgery  unless   directed by surgeon or cardiologist;   5. Medical Optimization  with Dr Cortez due  to elevated BP denies headache blurry vision ; pt instructed go to ED if develop symptoms   6. EZ wash instructions given & mupirocin instructions given  7. Labs EKG  as per surgeon request   8. Pt instructed to self quarantine   9. Covid Testing scheduled Pt notified and aware  10. Pt denies covid symptoms shortness of breath fever cough

## 2020-08-12 NOTE — H&P PST ADULT - NSICDXPASTMEDICALHX_GEN_ALL_CORE_FT
PAST MEDICAL HISTORY:  Back pain     Bursitis left shoulder    History of 2019 novel coronavirus disease (COVID-19)     HTN (hypertension)     Shoulder fracture left 3/2020 PAST MEDICAL HISTORY:  Back pain     Bursitis left shoulder    History of 2019 novel coronavirus disease (COVID-19)     HTN (hypertension)     Seizure     Shoulder fracture left 3/2020

## 2020-08-12 NOTE — H&P PST ADULT - HISTORY OF PRESENT ILLNESS
70 year old male with fracture left shoulder Pt was in [facility] in March 2020 due to Covid; he fell while he was in the hospital c/o left shoulder pain and limited ROM; wears sling for support; denies numbness and tingling;  He presents to PST for planned Left reverse shoulder arthroplasty

## 2020-08-12 NOTE — H&P PST ADULT - NSANTHOSAYNRD_GEN_A_CORE
No. LOCO screening performed.  STOP BANG Legend: 0-2 = LOW Risk; 3-4 = INTERMEDIATE Risk; 5-8 = HIGH Risk

## 2020-08-13 DIAGNOSIS — M75.52 BURSITIS OF LEFT SHOULDER: ICD-10-CM

## 2020-08-13 DIAGNOSIS — Z01.818 ENCOUNTER FOR OTHER PREPROCEDURAL EXAMINATION: ICD-10-CM

## 2020-08-16 ENCOUNTER — OUTPATIENT (OUTPATIENT)
Dept: OUTPATIENT SERVICES | Facility: HOSPITAL | Age: 70
LOS: 1 days | End: 2020-08-16
Payer: COMMERCIAL

## 2020-08-16 DIAGNOSIS — Z11.59 ENCOUNTER FOR SCREENING FOR OTHER VIRAL DISEASES: ICD-10-CM

## 2020-08-16 DIAGNOSIS — Z98.890 OTHER SPECIFIED POSTPROCEDURAL STATES: Chronic | ICD-10-CM

## 2020-08-16 PROCEDURE — U0003: CPT

## 2020-08-17 DIAGNOSIS — Z11.59 ENCOUNTER FOR SCREENING FOR OTHER VIRAL DISEASES: ICD-10-CM

## 2020-08-17 LAB — SARS-COV-2 RNA SPEC QL NAA+PROBE: SIGNIFICANT CHANGE UP

## 2020-08-19 ENCOUNTER — INPATIENT (INPATIENT)
Facility: HOSPITAL | Age: 70
LOS: 1 days | Discharge: HOME | DRG: 483 | End: 2020-08-21
Attending: ORTHOPAEDIC SURGERY | Admitting: ORTHOPAEDIC SURGERY
Payer: COMMERCIAL

## 2020-08-19 ENCOUNTER — APPOINTMENT (OUTPATIENT)
Dept: ORTHOPEDIC SURGERY | Facility: HOSPITAL | Age: 70
End: 2020-08-19

## 2020-08-19 VITALS
HEART RATE: 58 BPM | OXYGEN SATURATION: 100 % | HEIGHT: 74 IN | WEIGHT: 195.99 LBS | RESPIRATION RATE: 15 BRPM | DIASTOLIC BLOOD PRESSURE: 91 MMHG | SYSTOLIC BLOOD PRESSURE: 162 MMHG | TEMPERATURE: 98 F

## 2020-08-19 DIAGNOSIS — M19.012 PRIMARY OSTEOARTHRITIS, LEFT SHOULDER: ICD-10-CM

## 2020-08-19 DIAGNOSIS — Z98.890 OTHER SPECIFIED POSTPROCEDURAL STATES: Chronic | ICD-10-CM

## 2020-08-19 DIAGNOSIS — G40.909 EPILEPSY, UNSPECIFIED, NOT INTRACTABLE, WITHOUT STATUS EPILEPTICUS: ICD-10-CM

## 2020-08-19 DIAGNOSIS — M75.52 BURSITIS OF LEFT SHOULDER: ICD-10-CM

## 2020-08-19 DIAGNOSIS — S42.292P: ICD-10-CM

## 2020-08-19 DIAGNOSIS — Z86.19 PERSONAL HISTORY OF OTHER INFECTIOUS AND PARASITIC DISEASES: ICD-10-CM
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
## 2020-08-19 LAB
ANION GAP SERPL CALC-SCNC: 4 MMOL/L — LOW (ref 5–17)
BUN SERPL-MCNC: 11 MG/DL — SIGNIFICANT CHANGE UP (ref 7–23)
CALCIUM SERPL-MCNC: 7.9 MG/DL — LOW (ref 8.5–10.1)
CHLORIDE SERPL-SCNC: 112 MMOL/L — HIGH (ref 96–108)
CO2 SERPL-SCNC: 26 MMOL/L — SIGNIFICANT CHANGE UP (ref 22–31)
CREAT SERPL-MCNC: 1.22 MG/DL — SIGNIFICANT CHANGE UP (ref 0.5–1.3)
GLUCOSE SERPL-MCNC: 123 MG/DL — HIGH (ref 70–99)
POTASSIUM SERPL-MCNC: 4.9 MMOL/L — SIGNIFICANT CHANGE UP (ref 3.5–5.3)
POTASSIUM SERPL-SCNC: 4.9 MMOL/L — SIGNIFICANT CHANGE UP (ref 3.5–5.3)
SODIUM SERPL-SCNC: 142 MMOL/L — SIGNIFICANT CHANGE UP (ref 135–145)

## 2020-08-19 PROCEDURE — 23472 RECONSTRUCT SHOULDER JOINT: CPT | Mod: 22,LT

## 2020-08-19 PROCEDURE — 36415 COLL VENOUS BLD VENIPUNCTURE: CPT

## 2020-08-19 PROCEDURE — C1713: CPT

## 2020-08-19 PROCEDURE — 86769 SARS-COV-2 COVID-19 ANTIBODY: CPT

## 2020-08-19 PROCEDURE — 20902 REMOVAL OF BONE FOR GRAFT: CPT | Mod: LT

## 2020-08-19 PROCEDURE — 97116 GAIT TRAINING THERAPY: CPT | Mod: GP

## 2020-08-19 PROCEDURE — 80048 BASIC METABOLIC PNL TOTAL CA: CPT

## 2020-08-19 PROCEDURE — 97162 PT EVAL MOD COMPLEX 30 MIN: CPT | Mod: GP

## 2020-08-19 PROCEDURE — 85027 COMPLETE CBC AUTOMATED: CPT

## 2020-08-19 PROCEDURE — 73030 X-RAY EXAM OF SHOULDER: CPT | Mod: LT

## 2020-08-19 PROCEDURE — 99222 1ST HOSP IP/OBS MODERATE 55: CPT

## 2020-08-19 PROCEDURE — C1776: CPT

## 2020-08-19 PROCEDURE — 73030 X-RAY EXAM OF SHOULDER: CPT | Mod: 26,LT

## 2020-08-19 PROCEDURE — 97530 THERAPEUTIC ACTIVITIES: CPT | Mod: GP

## 2020-08-19 RX ORDER — ACETAMINOPHEN 500 MG
650 TABLET ORAL EVERY 6 HOURS
Refills: 0 | Status: DISCONTINUED | OUTPATIENT
Start: 2020-08-19 | End: 2020-08-21

## 2020-08-19 RX ORDER — SODIUM CHLORIDE 9 MG/ML
1000 INJECTION, SOLUTION INTRAVENOUS
Refills: 0 | Status: DISCONTINUED | OUTPATIENT
Start: 2020-08-19 | End: 2020-08-21

## 2020-08-19 RX ORDER — OXYCODONE HYDROCHLORIDE 5 MG/1
5 TABLET ORAL ONCE
Refills: 0 | Status: DISCONTINUED | OUTPATIENT
Start: 2020-08-19 | End: 2020-08-19

## 2020-08-19 RX ORDER — ASPIRIN/CALCIUM CARB/MAGNESIUM 324 MG
1 TABLET ORAL
Qty: 0 | Refills: 0 | DISCHARGE

## 2020-08-19 RX ORDER — SODIUM CHLORIDE 9 MG/ML
1000 INJECTION, SOLUTION INTRAVENOUS
Refills: 0 | Status: DISCONTINUED | OUTPATIENT
Start: 2020-08-19 | End: 2020-08-19

## 2020-08-19 RX ORDER — AMLODIPINE BESYLATE 2.5 MG/1
1 TABLET ORAL
Qty: 0 | Refills: 0 | DISCHARGE

## 2020-08-19 RX ORDER — ONDANSETRON 8 MG/1
4 TABLET, FILM COATED ORAL ONCE
Refills: 0 | Status: DISCONTINUED | OUTPATIENT
Start: 2020-08-19 | End: 2020-08-19

## 2020-08-19 RX ORDER — LABETALOL HCL 100 MG
1 TABLET ORAL
Qty: 0 | Refills: 0 | DISCHARGE

## 2020-08-19 RX ORDER — FENTANYL CITRATE 50 UG/ML
50 INJECTION INTRAVENOUS
Refills: 0 | Status: DISCONTINUED | OUTPATIENT
Start: 2020-08-19 | End: 2020-08-19

## 2020-08-19 RX ORDER — LOSARTAN POTASSIUM 100 MG/1
1 TABLET, FILM COATED ORAL
Qty: 0 | Refills: 0 | DISCHARGE

## 2020-08-19 RX ORDER — SUCRALFATE 1 G
1 TABLET ORAL
Qty: 0 | Refills: 0 | DISCHARGE

## 2020-08-19 RX ORDER — OLANZAPINE 15 MG/1
5 TABLET, FILM COATED ORAL
Refills: 0 | Status: DISCONTINUED | OUTPATIENT
Start: 2020-08-19 | End: 2020-08-19

## 2020-08-19 RX ORDER — PROCHLORPERAZINE MALEATE 5 MG
10 TABLET ORAL ONCE
Refills: 0 | Status: DISCONTINUED | OUTPATIENT
Start: 2020-08-19 | End: 2020-08-21

## 2020-08-19 RX ORDER — HYDROMORPHONE HYDROCHLORIDE 2 MG/ML
0.5 INJECTION INTRAMUSCULAR; INTRAVENOUS; SUBCUTANEOUS EVERY 6 HOURS
Refills: 0 | Status: DISCONTINUED | OUTPATIENT
Start: 2020-08-19 | End: 2020-08-21

## 2020-08-19 RX ORDER — ASCORBIC ACID 60 MG
500 TABLET,CHEWABLE ORAL DAILY
Refills: 0 | Status: DISCONTINUED | OUTPATIENT
Start: 2020-08-19 | End: 2020-08-19

## 2020-08-19 RX ORDER — OXYCODONE HYDROCHLORIDE 5 MG/1
5 TABLET ORAL EVERY 4 HOURS
Refills: 0 | Status: DISCONTINUED | OUTPATIENT
Start: 2020-08-19 | End: 2020-08-21

## 2020-08-19 RX ORDER — ACETAMINOPHEN 500 MG
1000 TABLET ORAL ONCE
Refills: 0 | Status: DISCONTINUED | OUTPATIENT
Start: 2020-08-19 | End: 2020-08-19

## 2020-08-19 RX ORDER — METOPROLOL TARTRATE 50 MG
50 TABLET ORAL
Refills: 0 | Status: DISCONTINUED | OUTPATIENT
Start: 2020-08-19 | End: 2020-08-19

## 2020-08-19 RX ORDER — CEFAZOLIN SODIUM 1 G
2000 VIAL (EA) INJECTION EVERY 8 HOURS
Refills: 0 | Status: COMPLETED | OUTPATIENT
Start: 2020-08-19 | End: 2020-08-20

## 2020-08-19 RX ORDER — OXYCODONE HYDROCHLORIDE 5 MG/1
10 TABLET ORAL EVERY 4 HOURS
Refills: 0 | Status: DISCONTINUED | OUTPATIENT
Start: 2020-08-19 | End: 2020-08-21

## 2020-08-19 RX ORDER — AMLODIPINE BESYLATE 2.5 MG/1
0 TABLET ORAL
Qty: 0 | Refills: 0 | DISCHARGE

## 2020-08-19 RX ADMIN — Medication 100 MILLIGRAM(S): at 20:47

## 2020-08-19 RX ADMIN — SODIUM CHLORIDE 75 MILLILITER(S): 9 INJECTION, SOLUTION INTRAVENOUS at 16:00

## 2020-08-19 NOTE — CONSULT NOTE ADULT - SUBJECTIVE AND OBJECTIVE BOX
CC- s/p LT reverse TSR    HPI:  71yo/M with PMH HTN, seizures presented for reverse LT TSR. Patient fell in 03/2020 sustaining LT proximal humerus fracture, attempted to treat conservatively with sling and pain meds. He presented today for elective TSR. Medical consult called for postop medical management    PMH- as above  PSH- abd surgery  Soc hx- denies smoking, no alcohol  Fam hx- unknown    8/19/20- seen in PACU postop, lethargic from anesthesia    Review of system- All 10 systems reviewed and is as per HPI otherwise negative.     T(C): 36.3 (08-19-20 @ 15:30), Max: 36.6 (08-19-20 @ 10:11)  HR: 68 (08-19-20 @ 16:00) (58 - 70)  BP: 108/55 (08-19-20 @ 16:00) (107/62 - 162/91)  RR: 12 (08-19-20 @ 16:00) (12 - 15)  SpO2: 100% (08-19-20 @ 16:00) (100% - 100%)  Wt(kg): --    LABS:    RADIOLOGY & ADDITIONAL TESTS:      PHYSICAL EXAM:  GENERAL: NAD, well-groomed, well-developed  HEAD:  Atraumatic, Normocephalic  EYES: EOMI, PERRLA, conjunctiva and sclera clear  HEENT: Moist mucous membranes  NECK: Supple, No JVD  NERVOUS SYSTEM:  Lethargic postop  CHEST/LUNG: Clear to auscultation bilaterally; No rales, rhonchi, wheezing, or rubs  HEART: Regular rate and rhythm; No murmurs, rubs, or gallops  ABDOMEN: Soft, Nontender, Nondistended; Bowel sounds present  GENITOURINARY- Voiding, no palpable bladder  EXTREMITIES:  2+ Peripheral Pulses, No clubbing, cyanosis, or edema  MUSCULOSKELTAL- LT shoulder dressing dry  SKIN-no rash, no lesion    Daily Height in cm: 187.96 (19 Aug 2020 10:11)        acetaminophen  IVPB .. 1000 milliGRAM(s) IV Intermittent once PRN  fentaNYL    Injectable 50 MICROGram(s) IV Push every 15 minutes PRN  lactated ringers. 1000 milliLiter(s) IV Continuous <Continuous>  lactated ringers. 1000 milliLiter(s) IV Continuous <Continuous>  ondansetron Injectable 4 milliGRAM(s) IV Push once PRN  oxyCODONE    IR 5 milliGRAM(s) Oral once PRN  prochlorperazine   Injectable 10 milliGRAM(s) IV Push once PRN    Assessment/Plan  #S/p LT reverse TSR  Ortho f/u appreciated  Pain meds prn  Keep in sling  PT per ortho  Monitor HH  Incentive spirometry    #Seizures  Cont home meds    #HTN- stable, cont home meds    #DVT proph- AC consult    #Dispo- thank you for consult, will follow with you. D/w ortho team

## 2020-08-20 ENCOUNTER — TRANSCRIPTION ENCOUNTER (OUTPATIENT)
Age: 70
End: 2020-08-20

## 2020-08-20 LAB
ANION GAP SERPL CALC-SCNC: 7 MMOL/L — SIGNIFICANT CHANGE UP (ref 5–17)
BUN SERPL-MCNC: 18 MG/DL — SIGNIFICANT CHANGE UP (ref 7–23)
CALCIUM SERPL-MCNC: 8.1 MG/DL — LOW (ref 8.5–10.1)
CHLORIDE SERPL-SCNC: 108 MMOL/L — SIGNIFICANT CHANGE UP (ref 96–108)
CO2 SERPL-SCNC: 25 MMOL/L — SIGNIFICANT CHANGE UP (ref 22–31)
CREAT SERPL-MCNC: 1.2 MG/DL — SIGNIFICANT CHANGE UP (ref 0.5–1.3)
GLUCOSE SERPL-MCNC: 119 MG/DL — HIGH (ref 70–99)
HCT VFR BLD CALC: 30.8 % — LOW (ref 39–50)
HGB BLD-MCNC: 10 G/DL — LOW (ref 13–17)
MCHC RBC-ENTMCNC: 27.8 PG — SIGNIFICANT CHANGE UP (ref 27–34)
MCHC RBC-ENTMCNC: 32.5 GM/DL — SIGNIFICANT CHANGE UP (ref 32–36)
MCV RBC AUTO: 85.6 FL — SIGNIFICANT CHANGE UP (ref 80–100)
PLATELET # BLD AUTO: 162 K/UL — SIGNIFICANT CHANGE UP (ref 150–400)
POTASSIUM SERPL-MCNC: 4.1 MMOL/L — SIGNIFICANT CHANGE UP (ref 3.5–5.3)
POTASSIUM SERPL-SCNC: 4.1 MMOL/L — SIGNIFICANT CHANGE UP (ref 3.5–5.3)
RBC # BLD: 3.6 M/UL — LOW (ref 4.2–5.8)
RBC # FLD: 15 % — HIGH (ref 10.3–14.5)
SARS-COV-2 IGG SERPL QL IA: POSITIVE
SARS-COV-2 IGM SERPL IA-ACNC: 1.77 INDEX — HIGH
SODIUM SERPL-SCNC: 140 MMOL/L — SIGNIFICANT CHANGE UP (ref 135–145)
WBC # BLD: 6.99 K/UL — SIGNIFICANT CHANGE UP (ref 3.8–10.5)
WBC # FLD AUTO: 6.99 K/UL — SIGNIFICANT CHANGE UP (ref 3.8–10.5)

## 2020-08-20 PROCEDURE — 99232 SBSQ HOSP IP/OBS MODERATE 35: CPT

## 2020-08-20 PROCEDURE — 99223 1ST HOSP IP/OBS HIGH 75: CPT

## 2020-08-20 RX ORDER — METOPROLOL TARTRATE 50 MG
50 TABLET ORAL
Refills: 0 | Status: DISCONTINUED | OUTPATIENT
Start: 2020-08-20 | End: 2020-08-21

## 2020-08-20 RX ORDER — ASPIRIN/CALCIUM CARB/MAGNESIUM 324 MG
1 TABLET ORAL
Qty: 28 | Refills: 0
Start: 2020-08-20 | End: 2020-09-02

## 2020-08-20 RX ORDER — ACETAMINOPHEN 500 MG
1000 TABLET ORAL ONCE
Refills: 0 | Status: COMPLETED | OUTPATIENT
Start: 2020-08-20 | End: 2020-08-20

## 2020-08-20 RX ORDER — ASPIRIN/CALCIUM CARB/MAGNESIUM 324 MG
325 TABLET ORAL
Refills: 0 | Status: DISCONTINUED | OUTPATIENT
Start: 2020-08-20 | End: 2020-08-21

## 2020-08-20 RX ORDER — DOCUSATE SODIUM 100 MG
1 CAPSULE ORAL
Qty: 60 | Refills: 0
Start: 2020-08-20 | End: 2020-09-18

## 2020-08-20 RX ORDER — PANTOPRAZOLE SODIUM 20 MG/1
1 TABLET, DELAYED RELEASE ORAL
Qty: 14 | Refills: 0
Start: 2020-08-20 | End: 2020-09-02

## 2020-08-20 RX ORDER — ACETAMINOPHEN 500 MG
2 TABLET ORAL
Qty: 84 | Refills: 0
Start: 2020-08-20 | End: 2020-09-02

## 2020-08-20 RX ORDER — DIVALPROEX SODIUM 500 MG/1
250 TABLET, DELAYED RELEASE ORAL THREE TIMES A DAY
Refills: 0 | Status: DISCONTINUED | OUTPATIENT
Start: 2020-08-20 | End: 2020-08-21

## 2020-08-20 RX ORDER — OXYCODONE HYDROCHLORIDE 5 MG/1
1 TABLET ORAL
Qty: 42 | Refills: 0
Start: 2020-08-20 | End: 2020-08-26

## 2020-08-20 RX ORDER — OLANZAPINE 15 MG/1
5 TABLET, FILM COATED ORAL
Refills: 0 | Status: DISCONTINUED | OUTPATIENT
Start: 2020-08-20 | End: 2020-08-21

## 2020-08-20 RX ORDER — KETOROLAC TROMETHAMINE 30 MG/ML
15 SYRINGE (ML) INJECTION ONCE
Refills: 0 | Status: DISCONTINUED | OUTPATIENT
Start: 2020-08-20 | End: 2020-08-20

## 2020-08-20 RX ADMIN — Medication 400 MILLIGRAM(S): at 12:54

## 2020-08-20 RX ADMIN — OXYCODONE HYDROCHLORIDE 10 MILLIGRAM(S): 5 TABLET ORAL at 21:26

## 2020-08-20 RX ADMIN — OXYCODONE HYDROCHLORIDE 10 MILLIGRAM(S): 5 TABLET ORAL at 10:36

## 2020-08-20 RX ADMIN — HYDROMORPHONE HYDROCHLORIDE 0.5 MILLIGRAM(S): 2 INJECTION INTRAMUSCULAR; INTRAVENOUS; SUBCUTANEOUS at 23:30

## 2020-08-20 RX ADMIN — Medication 325 MILLIGRAM(S): at 23:01

## 2020-08-20 RX ADMIN — OXYCODONE HYDROCHLORIDE 10 MILLIGRAM(S): 5 TABLET ORAL at 09:53

## 2020-08-20 RX ADMIN — DIVALPROEX SODIUM 250 MILLIGRAM(S): 500 TABLET, DELAYED RELEASE ORAL at 12:51

## 2020-08-20 RX ADMIN — Medication 15 MILLIGRAM(S): at 13:02

## 2020-08-20 RX ADMIN — DIVALPROEX SODIUM 250 MILLIGRAM(S): 500 TABLET, DELAYED RELEASE ORAL at 23:01

## 2020-08-20 RX ADMIN — HYDROMORPHONE HYDROCHLORIDE 0.5 MILLIGRAM(S): 2 INJECTION INTRAMUSCULAR; INTRAVENOUS; SUBCUTANEOUS at 23:00

## 2020-08-20 RX ADMIN — OXYCODONE HYDROCHLORIDE 10 MILLIGRAM(S): 5 TABLET ORAL at 20:26

## 2020-08-20 RX ADMIN — Medication 50 MILLIGRAM(S): at 12:50

## 2020-08-20 RX ADMIN — OLANZAPINE 5 MILLIGRAM(S): 15 TABLET, FILM COATED ORAL at 12:51

## 2020-08-20 RX ADMIN — Medication 50 MILLIGRAM(S): at 23:01

## 2020-08-20 RX ADMIN — HYDROMORPHONE HYDROCHLORIDE 0.5 MILLIGRAM(S): 2 INJECTION INTRAMUSCULAR; INTRAVENOUS; SUBCUTANEOUS at 11:00

## 2020-08-20 RX ADMIN — Medication 1000 MILLIGRAM(S): at 13:20

## 2020-08-20 RX ADMIN — HYDROMORPHONE HYDROCHLORIDE 0.5 MILLIGRAM(S): 2 INJECTION INTRAMUSCULAR; INTRAVENOUS; SUBCUTANEOUS at 10:36

## 2020-08-20 RX ADMIN — Medication 100 MILLIGRAM(S): at 05:13

## 2020-08-20 RX ADMIN — OLANZAPINE 5 MILLIGRAM(S): 15 TABLET, FILM COATED ORAL at 23:01

## 2020-08-20 RX ADMIN — Medication 15 MILLIGRAM(S): at 13:20

## 2020-08-20 NOTE — DISCHARGE NOTE PROVIDER - CARE PROVIDER_API CALL
Bg Barr  [facility]  [address] [place] [address]  Phone: [phone]  Fax: [phone]  Established Patient  Follow Up Time:

## 2020-08-20 NOTE — DISCHARGE NOTE PROVIDER - HOSPITAL COURSE
The patient is a 70 year old male status post reverse total shoulder arthroplasty of a left proximal humerus fracture. The patient was medically optimized for the previously mentioned surgical procedure. The patient was taken to the operating room on date mentioned above. Prophylactic antibiotics were started before the procedure and continued for 24 hours.  There were no complications during the procedure and patient tolerated the procedure well.  The patient was transferred to recovery room in stable condition and subsequently to surgical floor.  Patient was placed on Aspirin for anticoagulation.  All home medications were continued.  The patient received physical therapy daily and daily labs were followed.  The dressing and brace was kept clean, dry, intact.  The rest of the hospital stay was unremarkable. The patient was discharged in stable condition to follow up as outpatient. H&P:    Pt is a 70y Male  PAST MEDICAL & SURGICAL HISTORY:    Seizure    Bursitis: left shoulder    Shoulder fracture: left 3/2020    Back pain    History of 2019 novel coronavirus disease (COVID-19)    HTN (hypertension)    H/O abdominal surgery: 1990&#x27;[id number]        Now s/p Left Reverse Total Shoulder Arthroplasty for Proximal Humerus Fracture. Pt is afebrile with stable vital signs. Pain is controlled. Alert and Oriented. Exam reveals intact AIN/PIN, wrist flexion and wrist extension, 2+Radial Pulse. Dressing is clean and dry with Silverlon dressing in place.        Hospital Course:    Patient presented to [facility] medically cleared for Shoulder Replacement Surgery after sustaining a recent left proximal humerus fracture/dislocation. Prophylactic antibiotics were started before the procedure and continued for 24 hours. They were admitted after surgery to the orthopedic floor.   There were no complications during the hospital stay.         Routine consults were obtained from the Anticoagulation Team for DVT/PE prophylaxis, from Physical Therapy for twice daily PT with NO Shoulder ROM. Consult to Hospitalist for Medical Co-management. Patient was placed in [facility] DVT prophylaxis post-op. Pertinent home medications were continued.  Daily labs were followed.          On POD 0 there were no overnight events and the pt was OOB with PT. POD 1, PT was continued, and anticipate POD 1 DC to home. The orthopedic Attending is aware and agrees.

## 2020-08-20 NOTE — DISCHARGE NOTE PROVIDER - NSDCCPCAREPLAN_GEN_ALL_CORE_FT
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

## 2020-08-20 NOTE — PHYSICAL THERAPY INITIAL EVALUATION ADULT - ASR EQUIP NEEDS DISCH PT EVAL
Pt is 6 foot 2 inches and may benefit from a 3:1 commode for height benefits and use of the handle./3:1 commode

## 2020-08-20 NOTE — PROGRESS NOTE ADULT - SUBJECTIVE AND OBJECTIVE BOX
CC- s/p LT reverse TSR    HPI:  71yo/M with PMH HTN, seizures presented for reverse LT TSR. Patient fell in 03/2020 sustaining LT proximal humerus fracture, attempted to treat conservatively with sling and pain meds. He is admitted for Left Reverse TSR.   Hospitalist service consulted for medical comanagement.     8/20: pt seen and examined this am. Had some pain at surgical site. Has been able to ambulate. no sob/chest pain. No difficulty voiding. no fever/cough.    ROS: all 10 systems reviewed and is as above otherwise negative.     Vital Signs Last 24 Hrs  T(C): 36.4 (20 Aug 2020 08:57), Max: 36.8 (20 Aug 2020 05:00)  T(F): 97.5 (20 Aug 2020 08:57), Max: 98.3 (20 Aug 2020 05:00)  HR: 77 (20 Aug 2020 08:57) (65 - 77)  BP: 151/80 (20 Aug 2020 08:57) (101/58 - 151/80)  RR: 16 (20 Aug 2020 08:57) (12 - 18)  SpO2: 96% (20 Aug 2020 08:57) (96% - 100%)      PHYSICAL EXAM:  GENERAL: NAD, well-groomed, well-developed  HEAD:  Atraumatic, Normocephalic  EYES: EOMI, PERRLA, conjunctiva and sclera clear  HEENT: Moist mucous membranes  NECK: Supple, No JVD  NERVOUS SYSTEM:  Lethargic postop  CHEST/LUNG: Clear to auscultation bilaterally;  HEART: Regular rate and rhythm;  ABDOMEN: Soft, Nontender, Nondistended; Bowel sounds present  GENITOURINARY- Voiding, no palpable bladder  EXTREMITIES:  2+ Peripheral Pulses, No clubbing, cyanosis, or edema  MUSCULOSKELETAL- LT shoulder dressing c/d/i. LUE in sling.  SKIN-no rash,      LABS:                        10.0   6.99  )-----------( 162      ( 20 Aug 2020 07:39 )             30.8     08-20    140  |  108  |  18  ----------------------------<  119<H>  4.1   |  25  |  1.20    Ca    8.1<L>      20 Aug 2020 07:39        MEDICATIONS  (STANDING):  aspirin enteric coated 325 milliGRAM(s) Oral two times a day  lactated ringers. 1000 milliLiter(s) (75 mL/Hr) IV Continuous <Continuous>    MEDICATIONS  (PRN):  acetaminophen   Tablet .. 650 milliGRAM(s) Oral every 6 hours PRN Temp greater or equal to 38C (100.4F), Mild Pain (1 - 3)  HYDROmorphone  Injectable 0.5 milliGRAM(s) SubCutaneous every 6 hours PRN Severe Pain (7 - 10)  oxyCODONE    IR 10 milliGRAM(s) Oral every 4 hours PRN Moderate Pain (4 - 6)  oxyCODONE    IR 5 milliGRAM(s) Oral every 4 hours PRN Mild Pain (1 - 3)  prochlorperazine   Injectable 10 milliGRAM(s) IV Push once PRN Nausea      Assessment/Plan  #OA S/p LT reverse TSR  -physical therapy  -pain control  -incentive spirometry  -bowel regimen.     #Acute blood loss anemia:  -d/t surgery  -no need for transfusion at this time.     #Seizures  -resume depakote now.    #HTN  -resume bb now    #DVT proph  - AC consult appreciated, asa bid.

## 2020-08-20 NOTE — CONSULT NOTE ADULT - ASSESSMENT
This is a 70 year old male s/p left total shoulder replacement with moderate risk fro VTE due to age, BMI, and surgery. Low risk for bleeding.    Discussed the risks vs benefits of full dose aspirin therapy with patient. Agrees with treatment and understands the necessity of therapy.    Plan:  ::Enteric coated ASA 325mg PO BID x 2 weeks  ::Protonix 40 mg PO daily  ::Daily CBC/BMP  ::Venodynes  ::Enc ambulation    Thank you for this consult, will sign off, please do not hesitate to reconsult if necessary.

## 2020-08-20 NOTE — PHYSICAL THERAPY INITIAL EVALUATION ADULT - PERTINENT HX OF CURRENT PROBLEM, REHAB EVAL
69yo/M with PMH HTN, seizures presented for reverse LT TSR. Patient fell in 03/2020 sustaining LT proximal humerus fracture, attempted to treat conservatively with sling and pain meds.

## 2020-08-20 NOTE — DISCHARGE NOTE PROVIDER - NSDCFUSCHEDAPPT_GEN_ALL_CORE_FT
SAGE CAMARA ; 09/01/2020 ; [facility] SAGE CAMARA ; 09/01/2020 ; [facility]PP [facility]

## 2020-08-20 NOTE — PHYSICAL THERAPY INITIAL EVALUATION ADULT - ADDITIONAL COMMENTS
SAC usage PRN at home, the pt reports having a few steps to negotiate at home with unilateral rails.

## 2020-08-20 NOTE — PROGRESS NOTE ADULT - ASSESSMENT
A/P: 70M s/p L reverse total shoulder replacement  Analgesia  DVT ppx  WBAT  No PROM  PT  FU labs  DC planning - home today

## 2020-08-20 NOTE — DISCHARGE NOTE PROVIDER - NSDCMRMEDTOKEN_GEN_ALL_CORE_FT
ascorbic acid 500 mg oral tablet: 1 tab(s) orally once a day  Colace 100 mg oral capsule: 1 cap(s) orally 2 times a day   divalproex sodium 250 mg oral delayed release tablet: 1 tab(s) orally 3 times a day  folic acid 1 mg oral tablet: 1 tab(s) orally once a day  metoprolol tartrate 50 mg oral tablet: 1 tab(s) orally 2 times a day  Multiple Vitamins oral tablet: 1 tab(s) orally once a day  OLANZapine 5 mg oral tablet, disintegratin tab(s) orally 2 times a day  oxyCODONE 5 mg oral tablet: 1 tab(s) orally every 4 hours, As Needed -for severe pain MDD:8   Protonix 40 mg oral delayed release tablet: 1 tab(s) orally once a day ascorbic acid 500 mg oral tablet: 1 tab(s) orally once a day  Colace 100 mg oral capsule: 1 cap(s) orally 2 times a day   divalproex sodium 250 mg oral delayed release tablet: 1 tab(s) orally 3 times a day  folic acid 1 mg oral tablet: 1 tab(s) orally once a day  metoprolol tartrate 50 mg oral tablet: 1 tab(s) orally 2 times a day  Multiple Vitamins oral tablet: 1 tab(s) orally once a day  OLANZapine 5 mg oral tablet, disintegratin tab(s) orally 2 times a day  oxyCODONE 5 mg oral tablet: 1 tab(s) orally every 4 hours, As Needed -for severe pain MDD:8   Protonix 40 mg oral delayed release tablet: 1 tab(s) orally once a day   Tylenol Extra Strength 500 mg oral tablet: 2 tab(s) orally 3 times a day

## 2020-08-20 NOTE — PROGRESS NOTE ADULT - SUBJECTIVE AND OBJECTIVE BOX
Pt S/E at bedside, no acute events overnight, pain controlled    Vital Signs Last 24 Hrs  T(C): 36.8 (20 Aug 2020 05:00), Max: 36.8 (20 Aug 2020 05:00)  T(F): 98.3 (20 Aug 2020 05:00), Max: 98.3 (20 Aug 2020 05:00)  HR: 65 (20 Aug 2020 05:00) (58 - 77)  BP: 125/66 (20 Aug 2020 05:00) (101/58 - 162/91)  BP(mean): --  RR: 16 (20 Aug 2020 05:00) (12 - 18)  SpO2: 100% (20 Aug 2020 05:00) (97% - 100%)    Gen: NAD    Left Upper Extremity:  Dressing clean dry intact  +AIN/PIN/M/R/U/Msc/Ax  SILT C5-T1  +Radial Pulse  Compartments soft  No calf TTP B/L

## 2020-08-20 NOTE — PROGRESS NOTE ADULT - ATTENDING COMMENTS
Orthopedic Sports Attending:  Agree with above resident/PA note.  Note edited where necessary.      Bg Barr, DO  Orthopaedic Surgery

## 2020-08-20 NOTE — DISCHARGE NOTE PROVIDER - NSDCFUADDINST_GEN_ALL_CORE_FT
PRESCRIPTIONS: An eRx will be sent elecronically to your pharmacy for  before DC or the day of. Usually Percocet narcotic pain medication (oxycodone/Acetaminophen) is prescribed for severe pain. This HAS TYLENOL in it, so dont take additional tylenol with it. Try to wean off the Percocet as soon as your can, at which point you can take extra strength tylenol and motrin/ibuprofen (unless you have kidney disease or GI bleeding in past).    We also recommend you get Colace stool softener which is over the counter to take so long as you are on Percocet.    If you need any refills on your medications, please call Dr. Barr’s office when you have a 3-day supply left. Some of the medications (narcotics) cannot be called into a pharmacy and the prescription will need to be picked up at the office or mailed to you. If you were taking other medications for blood pressure, heart problems etc., you should discuss this with your family physician.     BANDAGE/INCISION CARE : A water-resistant dressing is applied during surgery and will stay on until your first post-operative appointment. The dressing is waterproof so you may shower immediately but avoid soaking in bathtub or swimming pool.      Once your dressing is removed, you may leave your incision open to air or apply a dry gauze dressing. The sutures are dissolvable and Dermabond (surgical glue) is applied. Skin glue will gradually come off as incision heals. If you notice redness, swelling or drainage around incision, contact Dr. Barr’s office immediately.      SLING: For the first two to six weeks after your surgery, you will be wearing your sling the majority of time, coming out of it to complete exercises given to you by the therapists and Dr. Barr upon your discharge from the hospital. After your first post-operative visit, Dr. Barr may suggest you come out of your sling during the day to do activities in front of you.  Wearing the sling alerts others around you to be cautious and to avoid accidentally striking your arm. Also, during this time do not use your arm to pull or push yourself out of bed or out of a chair.     ACTIVITY: Walk Plenty. No sitting around. NO lifting with operative arm. See Detailed instructions in your packet.    ICE: Ice plenty and often during the first 2 weeks. Protect skin from direct ice using a towel or pillow case barrier.    PHYSICAL THERAPY: Dr. Barr prefers that his patients do NOT do formal Physical Therapy after Total Shoulder Replacement. He has found that most patients do well with progressing their motion and strength through normal daily activities. He has also found that sometimes physical therapy pushes patients too much causing increased pain and discomfort. At each post-operative appointment, Dr. Barr will teach you exercises to do own to work on range of motion. Although you should progress with some gentle range of motion, as demonstrated by Dr. Barr, do not force any shoulder motions. Most importantly, do not let anyone (family members, physical therapists, etc) force your arm into uncomfortable positions.      DRIVING: You are NOT permitted to drive a car while taking narcotic medication or when you are wearing a sling. Do not drive until after your first follow-up visit with your surgeon.     RETURNING TO WORK: Returning to work depends on the demands of your job and should be discussed with Dr. Barr before the surgery.     FOLLOW UP: Dr. Barr or MILY Joseph will need to see you for multiple appointments after your surgery. Typically, Dr. Barr or Opal will see you back after 2 weeks, 6 weeks, 3 months and 6 months. You will be seen at 1 year, 2 years, 5 years and 10 year intervals thereafter.     **Please refer to patient post op info packet given to you at office visit for further details. PRESCRIPTIONS: An eRx will be sent electronically to your pharmacy for  before DC or the day of. Oxycondone has been sent which you can take along with tylenol. Try to wean off the Oxycodone as soon as your can, at which point you can take motrin/ibuprofen (unless you have kidney disease or GI bleeding in past).    We also recommend you get Colace stool softener which is over the counter to take so long as you are on Oxycodone.    If you need any refills on your medications, please call Dr. Barr’s office when you have a 3-day supply left. Some of the medications (narcotics) cannot be called into a pharmacy and the prescription will need to be picked up at the office or mailed to you. If you were taking other medications for blood pressure, heart problems etc., you should discuss this with your family physician.     BANDAGE/INCISION CARE : A water-resistant dressing is applied during surgery and will stay on until your first post-operative appointment. The dressing is waterproof so you may shower immediately but avoid soaking in bathtub or swimming pool.      Once your dressing is removed, you may leave your incision open to air or apply a dry gauze dressing. The sutures are dissolvable and Dermabond (surgical glue) is applied. Skin glue will gradually come off as incision heals. If you notice redness, swelling or drainage around incision, contact Dr. Barr’s office immediately.      SLING: For the first two to six weeks after your surgery, you will be wearing your sling the majority of time, coming out of it to complete exercises given to you by the therapists and Dr. Barr upon your discharge from the hospital. After your first post-operative visit, Dr. Barr may suggest you come out of your sling during the day to do activities in front of you.  Wearing the sling alerts others around you to be cautious and to avoid accidentally striking your arm. Also, during this time do not use your arm to pull or push yourself out of bed or out of a chair.     ACTIVITY: Walk Plenty. No sitting around. NO lifting with operative arm. See Detailed instructions in your packet.    ICE: Ice plenty and often during the first 2 weeks. Protect skin from direct ice using a towel or pillow case barrier.    PHYSICAL THERAPY: Dr. Barr prefers that his patients do NOT do formal Physical Therapy after Total Shoulder Replacement. He has found that most patients do well with progressing their motion and strength through normal daily activities. He has also found that sometimes physical therapy pushes patients too much causing increased pain and discomfort. At each post-operative appointment, Dr. Barr will teach you exercises to do own to work on range of motion. Although you should progress with some gentle range of motion, as demonstrated by Dr. Barr, do not force any shoulder motions. Most importantly, do not let anyone (family members, physical therapists, etc) force your arm into uncomfortable positions.      DRIVING: You are NOT permitted to drive a car while taking narcotic medication or when you are wearing a sling. Do not drive until after your first follow-up visit with your surgeon.     RETURNING TO WORK: Returning to work depends on the demands of your job and should be discussed with Dr. Barr before the surgery.     FOLLOW UP: Dr. Barr or MILY Joseph will need to see you for multiple appointments after your surgery. Typically, Dr. Barr or Opal will see you back after 2 weeks, 6 weeks, 3 months and 6 months. You will be seen at 1 year, 2 years, 5 years and 10 year intervals thereafter.     **Please refer to patient post op info packet given to you at office visit for further details.

## 2020-08-20 NOTE — DISCHARGE NOTE PROVIDER - NSDCACTIVITY_GEN_ALL_CORE
Walking - Indoors allowed/Stairs allowed/Showering allowed/No heavy lifting/straining/Walking - Outdoors allowed

## 2020-08-20 NOTE — PHYSICAL THERAPY INITIAL EVALUATION ADULT - MODALITIES TREATMENT COMMENTS
The pt demonstrated good overall activity tolerance, responding well to therex review, transfer and ambulation tx. The pt was returned to supine and adjusted for comfort in bed, left UE secured in sling and by pillows, RN aware. CB, tray and phone in place. The pt was in NAD at end of tx.

## 2020-08-20 NOTE — CONSULT NOTE ADULT - SUBJECTIVE AND OBJECTIVE BOX
HPI:  Patient is a 70y old  Male who presents with a chief complaint of left shoulder pain, now s/p reverse total shoulder replacement .    Consulted by Dr. Barr for VTE prophylaxis, risk stratification, and anticoagulation management.    PAST MEDICAL & SURGICAL HISTORY:  Seizure  Bursitis: left shoulder  Shoulder fracture: left 3/2020  Back pain  History of 2019 novel coronavirus disease (COVID-19)  HTN (hypertension)  H/O abdominal surgery: &#x27;s      Interval History:  : Patient seen at [facility] after finishing physical therapy session. Per PT, walked well without issue. Patient reporting 5/10 pain to left shoulder. Explained necessity for VTE prophylaxis. He is on asa 81mg at home now. Explained will stop that for two weeks and prescribing ECASA 325mg PO twice daily for two weeks post-op. Patient agrees with treatment plan.    BMI: 25.2    CrCl: 73.4    EBL:250ml    Caprini VTE Risk Score:  CAPRINI SCORE  AGE RELATED RISK FACTORS                                                       MOBILITY RELATED FACTORS  [ ] Age 41-60 years                                            (1 Point)                  [ ] Bed rest                                                        (1 Point)  [x ] Age: 61-74 years                                           (2 Points)                [ ] Plaster cast                                                   (2 Points)  [ ] Age= 75 years                                              (3 Points)                 [ ] Bed bound for more than 72 hours                   (2 Points)    DISEASE RELATED RISK FACTORS                                               GENDER SPECIFIC FACTORS  [ ] Edema in the lower extremities                       (1 Point)           [ ] Pregnancy                                                            (1 Point)  [ ] Varicose veins                                               (1 Point)                  [ ] Post-partum < 6 weeks                                      (1 Point)             [ x] BMI > 25 Kg/m2                                            (1 Point)                  [ ] Hormonal therapy or oral contraception       (1 Point)                 [ ] Sepsis (in the previous month)                        (1 Point)             [ ] History of pregnancy complications                (1Point)  [ ] Pneumonia or serious lung disease                                             [ ] Unexplained or recurrent  (=/>3), premature                                 (In the previous month)                               (1 Point)                birth with toxemia or growth-restricted infant (1 Point)  [ ] Abnormal pulmonary function test            (1 Point)                                   SURGERY RELATED RISK FACTORS  [ ] Acute myocardial infarction                       (1 Point)                  [ ]  Section                                         (1 Point)  [ ] Congestive heart failure (in the previous month) (1 Point)   [ ] Minor surgery   lasting <45 minutes       (1 Point)   [ ] Inflammatory bowel disease                             (1 Point)          [ ] Arthroscopic surgery                                  (2 Points)  [ ] Central venous access                                    (2 Points)            [x ] General surgery lasting >45 minutes      (2 Points)       [ ] Stroke (in the previous month)                  (5 Points)            [ ] Elective major lower extremity arthroplasty (5 Points)                                   [  ] Malignancy (present or past include skin melanoma                                          but exclude  basal skin cell)    (2 points)                                      TRAUMA RELATED RISK FACTORS                HEMATOLOGY RELATED FACTORS                                  [ ] Fracture of the hip, pelvis, or leg                       (5 Points)  [ ] Prior episodes of VTE                                     (3 Points)          [ ] Acute spinal cord injury (in the previous month)  (5 Points)  [ ] Positive family history for VTE                         (3 Points)       [ ] Paralysis (less than 1 month)                          (5 Points)  [ ] Prothrombin 01782 A                                      (3 Points)         [ ] Multiple Trauma (within 1month)                 (5Points)                                                                                                                                                                [ ] Factor V Leiden                                          (3 Points)                                OTHER RISK FACTORS                          [ ] Lupus anticoagulants                                     (3 Points)                       [ ] BMI > 40                          (1 Point)                                                         [ ] Anticardiolipin antibodies                                (3 Points)                   [ ] Smoking                              (1Point)                                                [ ] High homocysteine in the blood                      (3 Points)                [  ] Diabetes requiring insulin (1point)                         [ ] Other congenital or acquired thrombophilia       (3 Points)          [  ] Chemotherapy                   (1 Point)  [ ] Heparin induced thrombocytopenia                  (3 Points)             [  ] Blood Transfusion                (1 point)                                                                                                             Total Score [     5     ]                                                                                                                                                                                                                                                                                                                                                                                                                                           IMPROVE Bleeding Risk Score:2.5      Falls Risk:   High (  )  Mod (x  )  Low (  )      FAMILY HISTORY:    Denies any personal or familial history of clotting or bleeding disorders.    Allergies    No Known Allergies    Intolerances    REVIEW OF SYSTEMS    (  )Fever	     (  )Constipation	(  )SOB				(  )Headache	(  )Dysuria  (  )Chills	     (  )Melena	(  )Dyspnea present on exertion (  )Dizziness   (  )Polyuria  (  )Nausea	     (  )Hematochezia	(  )Cough         (  )Syncope   	         (  )Hematuria  (  )Vomiting    (  )Chest Pain	(  )Wheezing			(  )Weakness  (  )Diarrhea     (  )Palpitations	(  )Anorexia			( x )Myalgia   (  x ) Arthralgia    Pertinent positives in HPI and daily subjective. All other systems negative.    PHYSICAL EXAM:    Constitutional: Appears Well    Neurological: A& O x 3    Skin: Warm    Respiratory and Thorax: normal effort; Breath sounds: normal; No rales/wheezing/rhonchi  	  Cardiovascular: S1, S2, regular, NMBR	    Gastrointestinal: BS + x 4Q, nontender	    Genitourinary:  Bladder nondistended, nontender    Musculoskeletal:   General Right:   no muscle/joint tenderness,   normal tone, no joint swelling,   ROM: full	    General Left:   no muscle/joint tenderness,   normal tone, no joint swelling,   ROM: limited    Shoulder: Lt: Drsing CDI; Sling/immob. noted; Cap refill good; Radial pulse +; Sensation intact    Lower extrems:   Right: no calf tenderness              negative edvin's sign               + pedal pulses    Left:   no calf tenderness              negative edvin's sign               + pedal pulses                          10.0   6.99  )-----------( 162      ( 20 Aug 2020 07:39 )             30.8       08-20    140  |  108  |  18  ----------------------------<  119<H>  4.1   |  25  |  1.20    Ca    8.1<L>      20 Aug 2020 07:39      MEDICATIONS  (STANDING):  aspirin enteric coated 325 milliGRAM(s) Oral two times a day  diVALproex  milliGRAM(s) Oral three times a day  lactated ringers. 1000 milliLiter(s) IV Continuous <Continuous>  metoprolol tartrate 50 milliGRAM(s) Oral two times a day  OLANZapine 5 milliGRAM(s) Oral two times a day      Vital Signs Last 24 Hrs  T(C): 36.4 (20 Aug 2020 08:57), Max: 36.8 (20 Aug 2020 05:00)  T(F): 97.5 (20 Aug 2020 08:57), Max: 98.3 (20 Aug 2020 05:00)  HR: 77 (20 Aug 2020 08:57) (65 - 77)  BP: 151/80 (20 Aug 2020 08:57) (101/58 - 151/80)  BP(mean): --  RR: 16 (20 Aug 2020 08:57) (12 - 18)  SpO2: 96% (20 Aug 2020 08:57) (96% - 100%)      **Current DVT Prophylaxis:    LMWH                   (  )  Heparin SQ           (  )  Coumadin             (  )  Xarelto                  (  )  Eliquis                   (  )  Venodynes           ( x )  Ambulation          (x )  UFH                       (  )  ECASA                   ( x )  Contraindicated  (  )

## 2020-08-20 NOTE — PHYSICAL THERAPY INITIAL EVALUATION ADULT - GENERAL OBSERVATIONS, REHAB EVAL
The pt was pleasant and cooperative, received on 2N with left UE secured in a sling, supine and eager to participate in PT evaluation.  1 IV, and bilateral SCDs in place.

## 2020-08-21 ENCOUNTER — TRANSCRIPTION ENCOUNTER (OUTPATIENT)
Age: 70
End: 2020-08-21

## 2020-08-21 VITALS
TEMPERATURE: 98 F | HEART RATE: 75 BPM | OXYGEN SATURATION: 97 % | SYSTOLIC BLOOD PRESSURE: 130 MMHG | DIASTOLIC BLOOD PRESSURE: 61 MMHG | RESPIRATION RATE: 16 BRPM

## 2020-08-21 LAB
ANION GAP SERPL CALC-SCNC: 7 MMOL/L — SIGNIFICANT CHANGE UP (ref 5–17)
BUN SERPL-MCNC: 18 MG/DL — SIGNIFICANT CHANGE UP (ref 7–23)
CALCIUM SERPL-MCNC: 7.9 MG/DL — LOW (ref 8.5–10.1)
CHLORIDE SERPL-SCNC: 105 MMOL/L — SIGNIFICANT CHANGE UP (ref 96–108)
CO2 SERPL-SCNC: 25 MMOL/L — SIGNIFICANT CHANGE UP (ref 22–31)
CREAT SERPL-MCNC: 0.97 MG/DL — SIGNIFICANT CHANGE UP (ref 0.5–1.3)
GLUCOSE SERPL-MCNC: 104 MG/DL — HIGH (ref 70–99)
HCT VFR BLD CALC: 29.2 % — LOW (ref 39–50)
HGB BLD-MCNC: 9.4 G/DL — LOW (ref 13–17)
MCHC RBC-ENTMCNC: 27.3 PG — SIGNIFICANT CHANGE UP (ref 27–34)
MCHC RBC-ENTMCNC: 32.2 GM/DL — SIGNIFICANT CHANGE UP (ref 32–36)
MCV RBC AUTO: 84.9 FL — SIGNIFICANT CHANGE UP (ref 80–100)
PLATELET # BLD AUTO: 155 K/UL — SIGNIFICANT CHANGE UP (ref 150–400)
POTASSIUM SERPL-MCNC: 3.8 MMOL/L — SIGNIFICANT CHANGE UP (ref 3.5–5.3)
POTASSIUM SERPL-SCNC: 3.8 MMOL/L — SIGNIFICANT CHANGE UP (ref 3.5–5.3)
RBC # BLD: 3.44 M/UL — LOW (ref 4.2–5.8)
RBC # FLD: 15.2 % — HIGH (ref 10.3–14.5)
SODIUM SERPL-SCNC: 137 MMOL/L — SIGNIFICANT CHANGE UP (ref 135–145)
WBC # BLD: 7.98 K/UL — SIGNIFICANT CHANGE UP (ref 3.8–10.5)
WBC # FLD AUTO: 7.98 K/UL — SIGNIFICANT CHANGE UP (ref 3.8–10.5)

## 2020-08-21 PROCEDURE — 99232 SBSQ HOSP IP/OBS MODERATE 35: CPT

## 2020-08-21 RX ADMIN — DIVALPROEX SODIUM 250 MILLIGRAM(S): 500 TABLET, DELAYED RELEASE ORAL at 05:43

## 2020-08-21 RX ADMIN — Medication 50 MILLIGRAM(S): at 09:19

## 2020-08-21 RX ADMIN — OXYCODONE HYDROCHLORIDE 10 MILLIGRAM(S): 5 TABLET ORAL at 01:31

## 2020-08-21 RX ADMIN — OLANZAPINE 5 MILLIGRAM(S): 15 TABLET, FILM COATED ORAL at 09:17

## 2020-08-21 RX ADMIN — HYDROMORPHONE HYDROCHLORIDE 0.5 MILLIGRAM(S): 2 INJECTION INTRAMUSCULAR; INTRAVENOUS; SUBCUTANEOUS at 05:58

## 2020-08-21 RX ADMIN — OXYCODONE HYDROCHLORIDE 10 MILLIGRAM(S): 5 TABLET ORAL at 00:32

## 2020-08-21 RX ADMIN — HYDROMORPHONE HYDROCHLORIDE 0.5 MILLIGRAM(S): 2 INJECTION INTRAMUSCULAR; INTRAVENOUS; SUBCUTANEOUS at 05:43

## 2020-08-21 RX ADMIN — Medication 325 MILLIGRAM(S): at 09:17

## 2020-08-21 NOTE — PROGRESS NOTE ADULT - ASSESSMENT
A/P: 70M s/p L reverse total shoulder replacement POD 2    Analgesia  DVT ppx  WBAT  No PROM  PT  FU labs  DC planning - home today

## 2020-08-21 NOTE — PROGRESS NOTE ADULT - ATTENDING COMMENTS
pt seen and examined this am with NP Fatemeh Saucedo. PC discussed. Agree with above documentation with changes made where appropriate.

## 2020-08-21 NOTE — PROGRESS NOTE ADULT - SUBJECTIVE AND OBJECTIVE BOX
CC- s/p LT reverse TSR    HPI:  69yo/M with PMH HTN, seizures presented for reverse LT TSR. Patient fell in 03/2020 sustaining LT proximal humerus fracture, attempted to treat conservatively with sling and pain meds. He is admitted for Left Reverse TSR.   Hospitalist service consulted for medical comanagement.     8/20: pt seen and examined this am. Had some pain at surgical site. Has been able to ambulate. no sob/chest pain. No difficulty voiding. no fever/cough.  8/21:  pt seen and examined OOB to chair, c/o some Left shoulder pain but tolerable, denies any CP Or SOB, going home today    ROS: all 10 systems reviewed and is as above otherwise negative.     Vital Signs Last 24 Hrs  T(C): 36.9 (08-21-20 @ 09:03), Max: 36.9 (08-21-20 @ 05:22)  T(F): 98.4 (08-21-20 @ 09:03), Max: 98.5 (08-21-20 @ 05:22)  HR: 75 (08-21-20 @ 09:03) (66 - 88)  BP: 130/61 (08-21-20 @ 09:03) (116/61 - 142/65)  BP(mean): --  RR: 16 (08-21-20 @ 09:03) (16 - 16)  SpO2: 97% (08-21-20 @ 09:03) (97% - 100%)    PHYSICAL EXAM:  GENERAL: NAD, well-groomed, well-developed  HEAD:  Atraumatic, Normocephalic  EYES: EOMI, PERRLA, conjunctiva and sclera clear  HEENT: Moist mucous membranes  NECK: Supple, No JVD  NERVOUS SYSTEM:  Lethargic postop  CHEST/LUNG: Clear to auscultation bilaterally;  HEART: Regular rate and rhythm;  ABDOMEN: Soft, Nontender, Nondistended; Bowel sounds present  GENITOURINARY- Voiding, no palpable bladder  EXTREMITIES:  2+ Peripheral Pulses, No clubbing, cyanosis, or edema  MUSCULOSKELETAL- LT shoulder dressing c/d/i. LUE in sling.  SKIN-no rash,      LABS:                                          9.4    7.98  )-----------( 155      ( 21 Aug 2020 07:33 )             29.2       08-21    137  |  105  |  18  ----------------------------<  104<H>  3.8   |  25  |  0.97    Ca    7.9<L>      21 Aug 2020 07:33            MEDICATIONS  (STANDING):  aspirin enteric coated 325 milliGRAM(s) Oral two times a day  diVALproex  milliGRAM(s) Oral three times a day  lactated ringers. 1000 milliLiter(s) (75 mL/Hr) IV Continuous <Continuous>  metoprolol tartrate 50 milliGRAM(s) Oral two times a day  OLANZapine 5 milliGRAM(s) Oral two times a day    MEDICATIONS  (PRN):  acetaminophen   Tablet .. 650 milliGRAM(s) Oral every 6 hours PRN Temp greater or equal to 38C (100.4F), Mild Pain (1 - 3)  HYDROmorphone  Injectable 0.5 milliGRAM(s) SubCutaneous every 6 hours PRN Severe Pain (7 - 10)  oxyCODONE    IR 10 milliGRAM(s) Oral every 4 hours PRN Moderate Pain (4 - 6)  oxyCODONE    IR 5 milliGRAM(s) Oral every 4 hours PRN Mild Pain (1 - 3)  prochlorperazine   Injectable 10 milliGRAM(s) IV Push once PRN Nausea    Assessment/Plan  #OA S/p LT reverse TSR  -physical therapy  -pain control  -incentive spirometry  -bowel regimen.     #Acute blood loss anemia:  -d/t surgery  -no need for transfusion at this time.     #Seizures  -resume depakote now.    #HTN  -resume bb now    dispo: home    #DVT proph  - AC consult appreciated, asa bid. CC- s/p LT reverse TSR    HPI:  71yo/M with PMH HTN, seizures presented for reverse LT TSR. Patient fell in 03/2020 sustaining LT proximal humerus fracture, attempted to treat conservatively with sling and pain meds. He is admitted for Left Reverse TSR.   Hospitalist service consulted for medical comanagement.     8/21:  pt seen and examined OOB to chair, c/o some Left shoulder pain but tolerable, denies any CP Or SOB, going home today    ROS: all 10 systems reviewed and is as above otherwise negative.     Vital Signs Last 24 Hrs  T(C): 36.9 (08-21-20 @ 09:03), Max: 36.9 (08-21-20 @ 05:22)  T(F): 98.4 (08-21-20 @ 09:03), Max: 98.5 (08-21-20 @ 05:22)  HR: 75 (08-21-20 @ 09:03) (66 - 88)  BP: 130/61 (08-21-20 @ 09:03) (116/61 - 142/65)  RR: 16 (08-21-20 @ 09:03) (16 - 16)  SpO2: 97% (08-21-20 @ 09:03) (97% - 100%)    PHYSICAL EXAM:  GENERAL: NAD, well-groomed, well-developed  HEAD:  Atraumatic, Normocephalic  EYES: EOMI, PERRLA, conjunctiva and sclera clear  HEENT: Moist mucous membranes  NECK: Supple, No JVD  NERVOUS SYSTEM:  A+OX3, non focal  CHEST/LUNG: Clear to auscultation bilaterally  HEART: Regular rate and rhythm;  ABDOMEN: Soft, Nontender, Nondistended; Bowel sounds present  GENITOURINARY- Voiding, no palpable bladder  EXTREMITIES:  2+ Peripheral Pulses, No clubbing, cyanosis, or edema  MUSCULOSKELETAL- LT shoulder dressing c/d/i. LUE in sling.  SKIN-no rash,    LABS:                        9.4    7.98  )-----------( 155      ( 21 Aug 2020 07:33 )             29.2     08-21    137  |  105  |  18  ----------------------------<  104<H>  3.8   |  25  |  0.97    Ca    7.9<L>      21 Aug 2020 07:33        MEDICATIONS  (STANDING):  aspirin enteric coated 325 milliGRAM(s) Oral two times a day  diVALproex  milliGRAM(s) Oral three times a day  lactated ringers. 1000 milliLiter(s) (75 mL/Hr) IV Continuous <Continuous>  metoprolol tartrate 50 milliGRAM(s) Oral two times a day  OLANZapine 5 milliGRAM(s) Oral two times a day    MEDICATIONS  (PRN):  acetaminophen   Tablet .. 650 milliGRAM(s) Oral every 6 hours PRN Temp greater or equal to 38C (100.4F), Mild Pain (1 - 3)  HYDROmorphone  Injectable 0.5 milliGRAM(s) SubCutaneous every 6 hours PRN Severe Pain (7 - 10)  oxyCODONE    IR 10 milliGRAM(s) Oral every 4 hours PRN Moderate Pain (4 - 6)  oxyCODONE    IR 5 milliGRAM(s) Oral every 4 hours PRN Mild Pain (1 - 3)  prochlorperazine   Injectable 10 milliGRAM(s) IV Push once PRN Nausea    Assessment/Plan  #OA S/p LT reverse TSR  -physical therapy  -pain control  -incentive spirometry  -bowel regimen.     #Acute blood loss anemia:  -d/t surgery  -no need for transfusion at this time.     #Seizures  -continue depakote.    #HTN  -bb    dispo: home    #DVT proph  - AC consult appreciated, asa bid.

## 2020-08-21 NOTE — DISCHARGE NOTE NURSING/CASE MANAGEMENT/SOCIAL WORK - PATIENT PORTAL LINK FT
You can access the FollowMyHealth Patient Portal offered by [facility] by registering at the following website: [email or web address]. By joining [facility]’s FollowMyHealth portal, you will also be able to view your health information using other applications (apps) compatible with our system.

## 2020-08-25 DIAGNOSIS — K21.9 GASTRO-ESOPHAGEAL REFLUX DISEASE WITHOUT ESOPHAGITIS: ICD-10-CM
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
## 2020-08-25 DIAGNOSIS — S42.292A OTHER DISPLACED FRACTURE OF UPPER END OF LEFT HUMERUS, INITIAL ENCOUNTER FOR CLOSED FRACTURE: ICD-10-CM

## 2020-08-25 DIAGNOSIS — D62 ACUTE POSTHEMORRHAGIC ANEMIA: ICD-10-CM

## 2020-08-25 DIAGNOSIS — M24.412 RECURRENT DISLOCATION, LEFT SHOULDER: ICD-10-CM

## 2020-08-25 DIAGNOSIS — M85.812 OTHER SPECIFIED DISORDERS OF BONE DENSITY AND STRUCTURE, LEFT SHOULDER: ICD-10-CM

## 2020-08-25 DIAGNOSIS — I10 ESSENTIAL (PRIMARY) HYPERTENSION: ICD-10-CM

## 2020-09-01 ENCOUNTER — APPOINTMENT (OUTPATIENT)
Dept: ORTHOPEDIC SURGERY | Facility: CLINIC | Age: 70
End: 2020-09-01
Payer: COMMERCIAL

## 2020-09-01 PROCEDURE — 73030 X-RAY EXAM OF SHOULDER: CPT | Mod: LT

## 2020-09-01 PROCEDURE — 99024 POSTOP FOLLOW-UP VISIT: CPT

## 2020-09-01 NOTE — HISTORY OF PRESENT ILLNESS
[___ Days Post Op] : post op day #[unfilled] [Clean/Dry/Intact] : clean, dry and intact [Neuro Intact] : an unremarkable neurological exam [Vascular Intact] : ~T peripheral vascular exam normal [Xray (Date:___)] : [unfilled] Xray -  [Chills] : no chills [Fever] : no fever [Nausea] : no nausea [Vomiting] : no vomiting [Erythema] : not erythematous [Discharge] : absent of discharge [Dehiscence] : not dehisced [de-identified] : S/P left reverse total shoulder replacement. DOS: 8/19/2020. [de-identified] : SAGE is a 70 year old male who presents today 13 days S/P left reverse total shoulder replacement.Overall he is doing very well. He has no complaints and is not taking any medications [de-identified] : Incisions are clean, dry and intact. No surrounding erythema. No drainage. Wound appears to be healing well. Unable to test ROM due to recent surgical procedure. Motor and sensation are intact distally. He has full range of motion of all fingers with capillary refill of <2 seconds throughout. He has good nerve function and median nerve, ulnar, radial, PIN, AIN. He has no sensory deficits over the C5-T1 nerve roots. Radial pulses 2+. Radial pulses 2+. Able to make an A-OK sign and thumbs up sign.\par  [de-identified] : 2 view xrays of pt left shoulder were performed today and available for me to review. They demonstrate adequate position of hardware. No fracture. No dislocation. No other deformities. [de-identified] : SAGE is a 70 year male who is doing extremely well and is without complaints. He presents today in his sling with pillow. Pt tolerated well.  Surgical sites are clean and dry without warmth or erythema, pt denies fever or chills.  He is to remain in the sling until we see him again at his next post-op rama and continue with being fully nonweightbearing to this extremity. He will start PT in 2 weeks from today 2-3x/week alongside HEP until we see them again in 4 weeks for clinical reassessment. He may use tylenol prn for pain. He agrees to the latter plan and does not have any further questions or concerns.

## 2020-09-02 PROBLEM — S42.90XA FRACTURE OF UNSPECIFIED SHOULDER GIRDLE, PART UNSPECIFIED, INITIAL ENCOUNTER FOR CLOSED FRACTURE: Chronic | Status: ACTIVE | Noted: 2020-08-12

## 2020-09-02 PROBLEM — R56.9 UNSPECIFIED CONVULSIONS: Chronic | Status: ACTIVE | Noted: 2020-08-12

## 2020-09-02 PROBLEM — M71.9 BURSOPATHY, UNSPECIFIED: Chronic | Status: ACTIVE | Noted: 2020-08-12

## 2020-09-02 PROBLEM — M54.9 DORSALGIA, UNSPECIFIED: Chronic | Status: ACTIVE | Noted: 2020-08-12

## 2020-09-02 PROBLEM — Z86.19 PERSONAL HISTORY OF OTHER INFECTIOUS AND PARASITIC DISEASES: Chronic | Status: ACTIVE | Noted: 2020-08-12

## 2020-10-01 ENCOUNTER — APPOINTMENT (OUTPATIENT)
Dept: ORTHOPEDIC SURGERY | Facility: CLINIC | Age: 70
End: 2020-10-01
Payer: COMMERCIAL

## 2020-10-01 DIAGNOSIS — M75.52 BURSITIS OF LEFT SHOULDER: ICD-10-CM

## 2020-10-01 PROCEDURE — 73030 X-RAY EXAM OF SHOULDER: CPT | Mod: LT

## 2020-10-01 PROCEDURE — 99024 POSTOP FOLLOW-UP VISIT: CPT

## 2020-10-06 NOTE — PROGRESS NOTE ADULT - SUBJECTIVE AND OBJECTIVE BOX
Recorded ECG: HR=57 Condition=Condition 1 Pt S/E at bedside, no acute events overnight, pain controlled, no SOB, fever, calf tenderness    Vital Signs Last 24 Hrs  T(C): 36.9 (21 Aug 2020 05:22), Max: 36.9 (21 Aug 2020 05:22)  T(F): 98.5 (21 Aug 2020 05:22), Max: 98.5 (21 Aug 2020 05:22)  HR: 88 (21 Aug 2020 05:22) (66 - 88)  BP: 142/65 (21 Aug 2020 05:22) (116/61 - 151/80)  BP(mean): --  RR: 16 (21 Aug 2020 05:22) (16 - 16)  SpO2: 99% (21 Aug 2020 05:22) (96% - 100%)      Gen: NAD    Left Upper Extremity:  Dressing clean dry intact  +AIN/PIN/M/R/U/Msc/Ax  SILT C5-T1  +Radial Pulse  Compartments soft  No calf TTP B/L

## 2020-10-13 ENCOUNTER — APPOINTMENT (OUTPATIENT)
Dept: RHEUMATOLOGY | Facility: CLINIC | Age: 70
End: 2020-10-13

## 2020-10-27 ENCOUNTER — APPOINTMENT (OUTPATIENT)
Dept: ORTHOPEDIC SURGERY | Facility: CLINIC | Age: 70
End: 2020-10-27
Payer: COMMERCIAL

## 2020-10-27 DIAGNOSIS — M25.642 STIFFNESS OF LEFT HAND, NOT ELSEWHERE CLASSIFIED: ICD-10-CM

## 2020-10-27 PROCEDURE — 99072 ADDL SUPL MATRL&STAF TM PHE: CPT

## 2020-10-27 PROCEDURE — 73030 X-RAY EXAM OF SHOULDER: CPT | Mod: LT

## 2020-10-27 PROCEDURE — 99213 OFFICE O/P EST LOW 20 MIN: CPT | Mod: 24

## 2020-10-27 NOTE — HISTORY OF PRESENT ILLNESS
[___ Weeks Post Op] : [unfilled] weeks post op [Clean/Dry/Intact] : clean, dry and intact [Neuro Intact] : an unremarkable neurological exam [Vascular Intact] : ~T peripheral vascular exam normal [Xray (Date:___)] : [unfilled] Xray -  [Chills] : no chills [Fever] : no fever [Nausea] : no nausea [Vomiting] : no vomiting [Erythema] : not erythematous [Discharge] : absent of discharge [Dehiscence] : not dehisced [de-identified] : S/P left reverse total shoulder replacement. DOS: 8/19/2020. [de-identified] : SAGE is a 70 year old male who presents today 5.5 weeks S/P left reverse total shoulder replacement. Overall he is doing very well. He has no complaints and is not taking any medications for his pain.  He currently is unable to make a closed fist due to hand paresthesia and pain.  There is no obvious swelling to the hand or fingers. [de-identified] : Left Shoulder\par \par Incisions are clean, dry and intact. No surrounding erythema. No drainage. Wound appears to be healing well. Unable to test ROM due to recent surgical procedure. Motor and sensation are intact distally. He has full range of motion of all fingers with capillary refill of <2 seconds throughout. He has good nerve function and median nerve, ulnar, radial, PIN, AIN. He has no sensory deficits over the C5-T1 nerve roots. Radial pulses 2+. Radial pulses 2+. Able to make an A-OK sign and thumbs up sign, delayed BILAT, s/p COVID. [de-identified] : 2 view xrays of pts left shoulder were performed today and available for me to review. They demonstrate adequate position of hardware. No fracture. No dislocation. No other deformities. [de-identified] : SAGE is a 70 year male who is doing well. He presents today without his sling and pt is tolerating this well. He will continue to progress his weightbearing and ROM with PT as they feel necessary. Surgical sites are clean and dry without warmth or erythema, pt denies fever or chills.  He will continue PT 2-3x/week alongside HEP until we see them again in 2 months for clinical reassessment. He may use tylenol prn for pain. \par \par Due to sequlae secondary to COVID infection, I recommend BILAT hand OT to increase dexterity/ strength with consultation by Rheumatology. I added a referral for JORDANA lazo through Health system to assist the patient in this process.  He agrees to the latter plan and does not have any further questions or concerns.

## 2020-11-24 ENCOUNTER — APPOINTMENT (OUTPATIENT)
Dept: ORTHOPEDIC SURGERY | Facility: CLINIC | Age: 70
End: 2020-11-24
Payer: COMMERCIAL

## 2020-11-24 PROCEDURE — 73030 X-RAY EXAM OF SHOULDER: CPT | Mod: LT

## 2020-11-24 PROCEDURE — 99214 OFFICE O/P EST MOD 30 MIN: CPT

## 2020-12-01 LAB — SARS-COV-2 N GENE NPH QL NAA+PROBE: NOT DETECTED

## 2020-12-02 NOTE — PHYSICAL THERAPY INITIAL EVALUATION ADULT - GENERAL OBSERVATIONS, REHAB EVAL
Pt received supine in bed + orellana, + LUE sling, c/o 3/10 pain, pt agreeable to PT (4) no impairment

## 2020-12-14 NOTE — PHYSICAL EXAM
[de-identified] : Physical Exam:\par General: Well appearing, no acute distress\par Neurologic: A&Ox3, No focal deficits\par Head: NCAT without abrasions, lacerations, or ecchymosis to head, face, or scalp\par Eyes: No scleral icterus, no gross abnormalities\par Respiratory: Equal chest wall expansion bilaterally, no accessory muscle use\par Lymphatic: No lymphadenopathy palpated\par Skin: Warm and dry\par Psychiatric: Normal mood and affect\par \par Left Shoulder\par \par Incisions are clean, dry and intact. No surrounding erythema. No drainage. Wound appears to be healing well. Unable to test ROM due to recent surgical procedure. Motor and sensation are intact distally. He has full range of motion of all fingers with capillary refill of <2 seconds throughout. He has good nerve function and median nerve, ulnar, radial, PIN, AIN. He has no sensory deficits over the C5-T1 nerve roots. Radial pulses 2+. Radial pulses 2+. Able to make an A-OK sign and thumbs up sign, delayed BILAT, s/p COVID. [de-identified] : 2 views of the left shoulder performed today available for me to review.  They demonstrate adequate placement of the hardware.  Drain placed appears to be in active position.  Healing of the tuberosity is noted.  No deformities.  No dislocation.

## 2020-12-14 NOTE — HISTORY OF PRESENT ILLNESS
[___ mths] : [unfilled] month(s) ago [de-identified] : SAGE is a 70 year old male who presents today 5.5 weeks S/P left reverse total shoulder replacement. Overall he is doing very well. He has no complaints and is not taking any medications for his pain. He currently is unable to make a closed fist due to hand paresthesia and pain. There is no obvious swelling to the hand or fingers. Current symptoms include no chills, no fever, no nausea and no vomiting. \par

## 2020-12-14 NOTE — DISCUSSION/SUMMARY
[de-identified] : SAGE is a 70 year old male who presents today 5.5 weeks S/P left reverse total shoulder replacement. Overall he is doing very well. He has no complaints and is not taking any medications for his pain. He currently is unable to make a closed fist due to hand paresthesia and pain. There is no obvious swelling to the hand or fingers. Current symptoms include no chills, no fever, no nausea and no vomiting.  He presents today without his sling and pt is tolerating this well. He will continue to progress his weightbearing and ROM with PT as they feel necessary. Surgical sites are clean and dry without warmth or erythema, pt denies fever or chills.  He will continue PT 2-3x/week alongside HEP until we see them again in 2 months for clinical reassessment. He may use tylenol prn for pain. \par \par Due to sequlae secondary to COVID infection, I recommend BILAT hand OT to increase dexterity/ strength. He may return to work on December 1.  He agrees to the latter plan and does not have any further questions or concerns.

## 2020-12-14 NOTE — REASON FOR VISIT
[Follow-Up Visit] : a follow-up visit for [FreeTextEntry2] : 3 months s/p left reverse TSR. DOS: 8/19/2020.

## 2021-01-26 ENCOUNTER — APPOINTMENT (OUTPATIENT)
Dept: ORTHOPEDIC SURGERY | Facility: CLINIC | Age: 71
End: 2021-01-26
Payer: COMMERCIAL

## 2021-01-26 DIAGNOSIS — M65.352 TRIGGER FINGER, LEFT LITTLE FINGER: ICD-10-CM

## 2021-01-26 DIAGNOSIS — U07.1 COVID-19: ICD-10-CM

## 2021-01-26 DIAGNOSIS — G93.49 COVID-19: ICD-10-CM

## 2021-01-26 PROCEDURE — 99214 OFFICE O/P EST MOD 30 MIN: CPT | Mod: 25

## 2021-01-26 PROCEDURE — 73030 X-RAY EXAM OF SHOULDER: CPT | Mod: LT

## 2021-01-26 PROCEDURE — 20610 DRAIN/INJ JOINT/BURSA W/O US: CPT | Mod: RT

## 2021-01-26 PROCEDURE — 99072 ADDL SUPL MATRL&STAF TM PHE: CPT

## 2021-01-26 NOTE — PHYSICAL EXAM
[de-identified] : Physical Exam:\par General: Well appearing, no acute distress\par Neurologic: A&Ox3, No focal deficits\par Head: NCAT without abrasions, lacerations, or ecchymosis to head, face, or scalp\par Eyes: No scleral icterus, no gross abnormalities\par Respiratory: Equal chest wall expansion bilaterally, no accessory muscle use\par Lymphatic: No lymphadenopathy palpated\par Skin: Warm and dry\par Psychiatric: Normal mood and affect\par \par Left Shoulder\par \par Incisions are clean, dry and intact. No surrounding erythema. No drainage. Wound appears to be healing well. Unable to test ROM due to recent surgical procedure. Motor and sensation are intact distally. He has full range of motion of all fingers with capillary refill of <2 seconds throughout. He has good nerve function and median nerve, ulnar, radial, PIN, AIN. He has no sensory deficits over the C5-T1 nerve roots. Radial pulses 2+. Radial pulses 2+. Able to make an A-OK sign and thumbs up sign, delayed BILAT, s/p COVID. [de-identified] : 2 views of the left shoulder performed today available for me to review.  They demonstrate adequate placement of the hardware.  Drain placed appears to be in active position.  Healing of the tuberosity is noted.  No deformities.  No dislocation.

## 2021-01-26 NOTE — DISCUSSION/SUMMARY
[de-identified] : SAGE is a 71 year old male who presents today 5 months S/P left reverse total shoulder replacement. Overall he is doing very well. He has no complaints and is not taking any medications for his pain. He currently is unable to make a closed fist due to hand paresthesia and pain. There is no obvious swelling to the hand or fingers. He does have a noted trigger finger to his left 5th digit. 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 Current symptoms include no chills, no fever, no nausea and no vomiting. \par \par Due to COVID encephalopathy and sequelae of this, I recommended continued OT. Due to his new onset trigger finger, i recommend consultation by my partner, Dr Spicer for this issue. For his continued strengthening of his left shoulder and evaluation of his right, I ordered him more PT. This has shown significant improvements in his symptoms in the past and I would like him to continue to BILAT shoulders 2x/week until seeing us again. He will start PT in 1 week from today since due to his acute right shoulder pain, he elects for a cortisone injection today. He tolerated the procedure well. He will continue HEP daily as well. We will see him back in 2 months for clinical reassessment. He agrees with the above plan and all questions were answered.\par

## 2021-01-26 NOTE — HISTORY OF PRESENT ILLNESS
[___ mths] : [unfilled] month(s) ago [de-identified] : SAGE is a 71 year old male who presents today 5 months S/P left reverse total shoulder replacement. Overall he is doing very well. He has no complaints and is not taking any medications for his pain. He currently is unable to make a closed fist due to hand paresthesia and pain. There is no obvious swelling to the hand or fingers. He does have a noted trigger finger to his left 5th digit. 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 Current symptoms include no chills, no fever, no nausea and no vomiting. \par

## 2021-01-26 NOTE — PROCEDURE
[de-identified] : Injection: Right shoulder (Subacromial).\par Indication: Pain\par \par A discussion was had with the patient regarding this procedure and all questions were answered. All risks, benefits and alternatives were discussed. These included but were not limited to bleeding, infection, and allergic reaction. Alcohol was used to clean the skin, and betadine was used to sterilize and prep the area in the posterior aspect of the right shoulder. Ethyl chloride spray was then used as a topical anesthetic. A 22-gauge needle was used to inject 3cc 1% xylocaine, 2cc 0.25% bupivacaine and 1cc of 40mg/mL triamcinolone acetonide into the right subacromial space. A sterile bandage was then applied. The patient tolerated the procedure well and there were no complications.\par

## 2021-04-27 ENCOUNTER — APPOINTMENT (OUTPATIENT)
Dept: ORTHOPEDIC SURGERY | Facility: CLINIC | Age: 71
End: 2021-04-27

## 2021-05-20 ENCOUNTER — APPOINTMENT (OUTPATIENT)
Dept: ORTHOPEDIC SURGERY | Facility: CLINIC | Age: 71
End: 2021-05-20
Payer: COMMERCIAL

## 2021-05-20 ENCOUNTER — NON-APPOINTMENT (OUTPATIENT)
Age: 71
End: 2021-05-20

## 2021-05-20 PROCEDURE — 20610 DRAIN/INJ JOINT/BURSA W/O US: CPT | Mod: RT

## 2021-05-20 PROCEDURE — 99214 OFFICE O/P EST MOD 30 MIN: CPT | Mod: 25

## 2021-05-20 PROCEDURE — 73030 X-RAY EXAM OF SHOULDER: CPT | Mod: LT

## 2021-06-11 NOTE — HISTORY OF PRESENT ILLNESS
[___ mths] : [unfilled] month(s) ago [de-identified] : SAGE is a 71 year old male who presents today S/P left reverse total shoulder replacement. He is doing well overall. Working with physical therapy. Complains of stiffness and occasional pain in L shoulder but pain is controlled overall. He has a new compliant today of R shoulder pain without traumatic JERILYN. Denies neurological symptoms in R shoulder. Corina mechanical symptoms. Here today to discuss treatment options for R shoulder.

## 2021-06-11 NOTE — ADDENDUM
[FreeTextEntry1] : Documented by Kevin Lopez acting as a scribe for Dr. Barr on 05/20/2021. \par \par All medical record entries made by the Scribe were at my, Dr. Barr's, direction and\par personally dictated by me on 05/20/2021. I have reviewed the chart and agree that the record\par accurately reflects my personal performance of the history, physical exam, procedure and imaging.

## 2021-06-11 NOTE — PHYSICAL EXAM
[de-identified] : Physical Exam:\par General: Well appearing, no acute distress\par Neurologic: A&Ox3, No focal deficits\par Head: NCAT without abrasions, lacerations, or ecchymosis to head, face, or scalp\par Eyes: No scleral icterus, no gross abnormalities\par Respiratory: Equal chest wall expansion bilaterally, no accessory muscle use\par Lymphatic: No lymphadenopathy palpated\par Skin: Warm and dry\par Psychiatric: Normal mood and affect\par \par Left Shoulder\par \par Incisions are clean, dry and intact. No surrounding erythema. No drainage. Wound appears to be healing well. ROM  w/ hitching, ER 25, IR back pocket. Motor and sensation are intact distally. He has full range of motion of all fingers with capillary refill of <2 seconds throughout. He has good nerve function and median nerve, ulnar, radial, PIN, AIN. He has no sensory deficits over the C5-T1 nerve roots. Radial pulses 2+. Radial pulses 2+. Able to make an A-OK sign and thumbs up sign, delayed BILAT, s/p COVID.\par \par Right Shoulder\par ·	Inspection/Palpation: no tenderness, swelling or deformities\par ·	Range of Motion: no crepitus with ROM; Active FF 0 - 90 ; ER at side 0 - 10 ; IR to side ; Passive FF 0 - 90 ; ER at side 0 - 15 ; IR to side\par -              Arc of Motion: ER to 10 degrees, IR to 5 degrees\par ·	Strength: forward elevation in scapular plane [4/5], internal rotation [4/5], external rotation [4/5], adduction [4/5] and abduction [4/5]\par ·	Stability: no joint instability on provocative testing\par ·	Tests: unable due to lack of ROM\par   [de-identified] : 2 views of the left shoulder performed today available for me to review.  They demonstrate adequate placement of the hardware.  Drain placed appears to be in active position.  Healing of the tuberosity is noted.  No deformities.  No dislocation.

## 2021-06-11 NOTE — REVIEW OF SYSTEMS
[Joint Pain] : joint pain [Negative] : Heme/Lymph [FreeTextEntry9] : biLateral shoulder aftercare TSR

## 2021-06-11 NOTE — DISCUSSION/SUMMARY
[de-identified] : SAGE is a 71 year old male who presents today S/P left reverse total shoulder replacement. He is doing well overall. Working with physical therapy. Complains of stiffness and occasional pain in L shoulder but pain is controlled overall. He has a new compliant today of R shoulder pain without traumatic JERILYN. Denies neurological symptoms in R shoulder. Corina mechanical symptoms. Here today to discuss treatment options for R shoulder.\par \par We had a thorough discussion regarding the nature of his pain, the pathophysiology, as well as all treatment options. After review of pts radiographs and his clinical exam I believe his primary complaint to be RTC tendonitis of which conservative measures are indicated. Pt due to his acute pain elected for a corticosteroid injection at today's visit and tolerated the procedure well. He should take it easy for the next 2-3 days while icing the joint and they may start PT in 1 week from today, 2x/week x 4-6 weeks. At that time we will see him for clinical reassessment.  If he is pain free at that time he will see us in the future on an prn basis.  Pt agrees to the above plan and all questions were answered.\par

## 2021-08-05 ENCOUNTER — APPOINTMENT (OUTPATIENT)
Dept: ORTHOPEDIC SURGERY | Facility: CLINIC | Age: 71
End: 2021-08-05
Payer: MEDICARE

## 2021-08-05 DIAGNOSIS — S46.012A STRAIN OF MUSCLE(S) AND TENDON(S) OF THE ROTATOR CUFF OF LEFT SHOULDER, INITIAL ENCOUNTER: ICD-10-CM

## 2021-08-05 PROCEDURE — 20610 DRAIN/INJ JOINT/BURSA W/O US: CPT | Mod: RT

## 2021-08-05 PROCEDURE — 99214 OFFICE O/P EST MOD 30 MIN: CPT | Mod: 25

## 2021-08-05 PROCEDURE — 73030 X-RAY EXAM OF SHOULDER: CPT | Mod: LT

## 2021-08-05 NOTE — PROCEDURE
[de-identified] : Injection: Right shoulder (Subacromial). \par Indication: RTC tearing \par \par A discussion was had with the patient regarding this procedure and all questions were answered. All risks, benefits and alternatives were discussed. These included but were not limited to bleeding, infection, and allergic reaction. Alcohol was used to clean the skin, and betadine was used to sterilize and prep the area in the posterior aspect of the right shoulder. Ethyl chloride spray was then used as a topical anesthetic. A 22-gauge needle was used to inject 3cc 1% xylocaine, 2cc 0.25% bupivacaine and 1cc of 40mg/mL triamcinolone acetonide into the right subacromial space. A sterile bandage was then applied. The patient tolerated the procedure well and there were no complications.

## 2021-08-05 NOTE — PHYSICAL EXAM
[de-identified] : Physical Exam:\par General: Well appearing, no acute distress\par Neurologic: A&Ox3, No focal deficits\par Head: NCAT without abrasions, lacerations, or ecchymosis to head, face, or scalp\par Eyes: No scleral icterus, no gross abnormalities\par Respiratory: Equal chest wall expansion bilaterally, no accessory muscle use\par Lymphatic: No lymphadenopathy palpated\par Skin: Warm and dry\par Psychiatric: Normal mood and affect\par \par Left Shoulder\par \par Incisions are healed. No surrounding erythema. No drainage. ROM  w/ hitching, ER 25, IR back pocket. Motor and sensation are intact distally. He has full range of motion of all fingers with capillary refill of <2 seconds throughout. He has good nerve function and median nerve, ulnar, radial, PIN, AIN. He has no sensory deficits over the C5-T1 nerve roots. Radial pulses 2+. Radial pulses 2+. Able to make an A-OK sign and thumbs up sign, delayed BILAT, s/p COVID.\par \par Right Shoulder\par ·	Inspection/Palpation: no tenderness, swelling or deformities\par ·	Range of Motion: no crepitus with ROM; Active FF 0 - 90 ; ER at side 0 - 10 ; IR to side ; Passive FF 0 - 90 ; ER at side 0 - 15 ; IR to side\par -              Arc of Motion: ER to 10 degrees, IR to 5 degrees\par ·	Strength: forward elevation in scapular plane [4/5], internal rotation [4/5], external rotation [4/5], adduction [4/5] and abduction [4/5]\par ·	Stability: no joint instability on provocative testing\par ·	Tests: unable due to lack of ROM\par   [de-identified] : 4 views of the left shoulder performed today available for me to review.  They demonstrate adequate placement of the hardware.  Heterotropic ossification noted. Drain placed appears to be in active position.  Healing of the tuberosity is noted.  No deformities.  No dislocation.\par \par 4 views of R shoulder were performed today and available for me to review. Results were discussed with the patient. They demonstrate no f/x, dislocation or other deformity.\par

## 2021-08-05 NOTE — DISCUSSION/SUMMARY
[de-identified] : SAEG is a 71 year old male who presents today S/P left reverse total shoulder replacement, 1 year ago. Currently, he endorses bilat shoulder pain. He reports going to PT 2x/week since last visit with minor relief in pain. He also endorses L hand cramping. \par \par We had a thorough discussion regarding the nature of his pain, the pathophysiology, as well as all treatment options. In regards to his Right shoulder, Pt due to his acute pain elected for a corticosteroid injection at today's visit and tolerated the procedure well. He should take it easy for the next 2-3 days while icing the joint. He understands that in near future he necessitates total shoulder replacement. In regards to his left shoulder, At this point, patient ROM greatly improved, and his pain is from muscular reactivation, otherwise he is doing well and happy with his progress so far. Patient was given prescription of formal physical therapy that he will perform 2x/wk. Patient will follow up in 6-8 wks for repeat clinical assessment or on prn basis. All questions were answered and the patient verbalized understanding. The patient is in agreement with this treatment plan.

## 2021-08-05 NOTE — ADDENDUM
[FreeTextEntry1] : Documented by Kevin Lopez acting as a scribe for Dr. Barr on 08/05/2021. \par \par All medical record entries made by the Scribe were at my, Dr. Barr's, direction and\par personally dictated by me on 08/05/2021. I have reviewed the chart and agree that the record\par accurately reflects my personal performance of the history, physical exam, procedure and imaging.

## 2021-08-05 NOTE — HISTORY OF PRESENT ILLNESS
[___ yrs] : [unfilled] year(s) ago [3] : an average pain level of 3/10 [Lifting] : worsened by lifting [de-identified] : SAGE is a 71 year old male who presents today S/P left reverse total shoulder replacement, 1 year ago. Currently, he endorses bilat shoulder pain. He reports going to PT 2x/week since last visit with minor relief in pain. He also endorses L hand cramping.

## 2021-08-09 ENCOUNTER — APPOINTMENT (OUTPATIENT)
Dept: PULMONOLOGY | Facility: CLINIC | Age: 71
End: 2021-08-09
Payer: COMMERCIAL

## 2021-08-09 ENCOUNTER — NON-APPOINTMENT (OUTPATIENT)
Age: 71
End: 2021-08-09

## 2021-08-09 VITALS — DIASTOLIC BLOOD PRESSURE: 80 MMHG | OXYGEN SATURATION: 99 % | HEART RATE: 82 BPM | SYSTOLIC BLOOD PRESSURE: 140 MMHG

## 2021-08-09 VITALS — BODY MASS INDEX: 26.32 KG/M2 | WEIGHT: 205 LBS

## 2021-08-09 DIAGNOSIS — M54.9 DORSALGIA, UNSPECIFIED: ICD-10-CM

## 2021-08-09 DIAGNOSIS — Z86.79 PERSONAL HISTORY OF OTHER DISEASES OF THE CIRCULATORY SYSTEM: ICD-10-CM

## 2021-08-09 DIAGNOSIS — R93.89 ABNORMAL FINDINGS ON DIAGNOSTIC IMAGING OF OTHER SPECIFIED BODY STRUCTURES: ICD-10-CM

## 2021-08-09 PROCEDURE — 99204 OFFICE O/P NEW MOD 45 MIN: CPT

## 2021-08-09 RX ORDER — MELOXICAM 15 MG/1
15 TABLET ORAL
Qty: 21 | Refills: 2 | Status: DISCONTINUED | COMMUNITY
Start: 2021-05-20 | End: 2021-08-09

## 2021-08-09 RX ORDER — AMLODIPINE BESYLATE 10 MG/1
10 TABLET ORAL
Refills: 0 | Status: ACTIVE | COMMUNITY
Start: 2021-08-09

## 2021-08-09 NOTE — HISTORY OF PRESENT ILLNESS
[Awakes with Dry Mouth] : awakes with dry mouth [Never] : never [TextBox_4] : 72y/o  male from [facility]  never smoker  no known  lung disease nor exposure  work  driving bus\par -h/o covid +  3/202/2020 presented with   seizure with change in mental status and  pneumonia intubation - improved and sent home \par - had ct chest [facility] for chest pain  7/30/2021\par -minimal  biapical scarring  linear atelectasis  scarring  seen RUL  n oacute consolidation   no pleural effusion   cluster of air containing  cysts  LLL  2.6 x 2 cm  pneumatocele likely  -- 2 mm  RLL  \par \par - no cough  no shortness of breath  no chest pain  [Daytime Somnolence] : denies daytime somnolence [Snoring] : no snoring

## 2021-08-09 NOTE — REASON FOR VISIT
[Initial] : an initial visit [Abnormal CXR/ Chest CT] : an abnormal CXR/ chest CT [[employer] ] : provided by [employer]   [FreeTextEntry2] : Tracy [FreeTextEntry1] : [id number]

## 2021-08-09 NOTE — ASSESSMENT
[FreeTextEntry1] : 70y/o never smoker  male from [facility]\par \par 1- post -covid pneumonia 3/2020   with  ct 7/2021   cystic changes   likely from COVID ? vs scarring \par 2- back pain \par 3- HTN\par \par Recommendations\par 1- repeat  cxr pa/lateral \par 2-  rheumatology for back pain \par 3- vaccinations per primary\par \par return in two weeks\par \par Ct available  after   patient seen  will review with patient next visit  \par \par \par \par

## 2021-08-09 NOTE — REVIEW OF SYSTEMS
[Back Pain] : back pain [Fever] : no fever [Fatigue] : no fatigue [Chills] : no chills [Cough] : no cough [Hemoptysis] : no hemoptysis [Sputum] : no sputum [Dyspnea] : no dyspnea [GERD] : no gerd [Diarrhea] : no diarrhea [Rash] : no rash [Blood Transfusion] : no blood transfusion [Easy Bruising] : no easy bruising [Clotting Disorder/ Frequent bleeding] : no clotting disorder/ frequent bleeding [Headache] : no headache [Focal Weakness] : no focal weakness [Seizures] : no seizures [Dizziness] : no dizziness [Numbness] : no numbness [Memory Loss] : no memory loss [Depression] : no depression [Anxiety] : no anxiety [Panic Attacks] : no panic attacks [Diabetes] : no diabetes [Thyroid Problem] : no thyroid problem [Obesity] : no obesity

## 2021-08-10 ENCOUNTER — OUTPATIENT (OUTPATIENT)
Dept: OUTPATIENT SERVICES | Facility: HOSPITAL | Age: 71
LOS: 1 days | End: 2021-08-10
Payer: COMMERCIAL

## 2021-08-10 DIAGNOSIS — Z98.890 OTHER SPECIFIED POSTPROCEDURAL STATES: Chronic | ICD-10-CM

## 2021-08-10 DIAGNOSIS — R93.89 ABNORMAL FINDINGS ON DIAGNOSTIC IMAGING OF OTHER SPECIFIED BODY STRUCTURES: ICD-10-CM

## 2021-08-10 PROCEDURE — 71046 X-RAY EXAM CHEST 2 VIEWS: CPT | Mod: 26

## 2021-08-24 ENCOUNTER — APPOINTMENT (OUTPATIENT)
Dept: PULMONOLOGY | Facility: CLINIC | Age: 71
End: 2021-08-24
Payer: COMMERCIAL

## 2021-08-24 VITALS
OXYGEN SATURATION: 99 % | WEIGHT: 205 LBS | HEART RATE: 80 BPM | SYSTOLIC BLOOD PRESSURE: 132 MMHG | RESPIRATION RATE: 14 BRPM | BODY MASS INDEX: 26.31 KG/M2 | DIASTOLIC BLOOD PRESSURE: 82 MMHG | HEIGHT: 74 IN

## 2021-08-24 DIAGNOSIS — J98.4 OTHER DISORDERS OF LUNG: ICD-10-CM

## 2021-08-24 DIAGNOSIS — B94.8 SEQUELAE OF OTHER SPECIFIED INFECTIOUS AND PARASITIC DISEASES: ICD-10-CM

## 2021-08-24 PROCEDURE — 99213 OFFICE O/P EST LOW 20 MIN: CPT

## 2021-08-24 NOTE — ASSESSMENT
[FreeTextEntry1] : 72y/o never smoker  male from [facility]\par \par 1- post -covid pneumonia 3/2020   with  ct 7/2021   cystic changes   likely from COVID ? vs scarring \par 2- back pain \par 3- HTN\par \par Recommendations\par 1- likely    pneumatocele  stable on cxr \par 2-  rheumatology for back pain \par 3- vaccinations per primary\par \par return in six months\par \par Ct  reviewed with  patient/ cxr reviewed as well  patient understood  above\par \par \par \par

## 2021-08-24 NOTE — REVIEW OF SYSTEMS
[Back Pain] : back pain [Fever] : no fever [Fatigue] : no fatigue [Chills] : no chills [Poor Appetite] : no poor appetite [Dry Eyes] : no dry eyes [Epistaxis] : no epistaxis [Cough] : no cough [Hemoptysis] : no hemoptysis [Chest Tightness] : no chest tightness [Sputum] : no sputum [Dyspnea] : no dyspnea [Pleuritic Pain] : no pleuritic pain [GERD] : no gerd [Abdominal Pain] : no abdominal pain [Nausea] : no nausea [Diarrhea] : no diarrhea [Dysphagia] : no dysphagia [Rash] : no rash [Blood Transfusion] : no blood transfusion [Easy Bruising] : no easy bruising [Clotting Disorder/ Frequent bleeding] : no clotting disorder/ frequent bleeding [Headache] : no headache [Focal Weakness] : no focal weakness [Seizures] : no seizures [Dizziness] : no dizziness [Numbness] : no numbness [Memory Loss] : no memory loss [Depression] : no depression [Anxiety] : no anxiety [Panic Attacks] : no panic attacks [Diabetes] : no diabetes [Thyroid Problem] : no thyroid problem [Obesity] : no obesity

## 2021-08-24 NOTE — HISTORY OF PRESENT ILLNESS
[Never] : never [Awakes with Dry Mouth] : awakes with dry mouth [TextBox_4] : 70y/o  male from [facility]  never smoker  no known  lung disease nor exposure  work  driving bus\par -h/o covid +  3/202/2020 presented with   seizure with change in mental status and  pneumonia intubation - improved and sent home \par - had ct chest [facility] for chest pain  7/30/2021\par -minimal  biapical scarring  linear atelectasis  scarring  seen RUL  n oacute consolidation   no pleural effusion   cluster of air containing  cysts  LLL  2.6 x 2 cm  pneumatocele likely  -- 2 mm  RLL  \par \par - no cough  no shortness of breath  no chest pain  [Daytime Somnolence] : denies daytime somnolence [Snoring] : no snoring

## 2021-08-24 NOTE — PHYSICAL EXAM
[III] : Mallampati Class: III [Supple] : supple [No JVD] : no jvd [Normal S1, S2] : normal s1, s2 [No Murmurs] : no murmurs [Clear to Auscultation Bilaterally] : clear to auscultation bilaterally [Not Tender] : not tender [No Masses] : no masses [No Hernias] : no hernias [Normal Gait] : normal gait [No Edema] : no edema [Non-Pitting] : non-pitting [No Motor Deficits] : no motor deficits [Normal Affect] : normal affect [TextBox_2] : pleasant male no distress speaking full sentences

## 2021-08-24 NOTE — REASON FOR VISIT
[Follow-Up] : a follow-up visit [Abnormal CXR/ Chest CT] : an abnormal CXR/ chest CT [TextBox_44] : post covid

## 2021-09-22 ENCOUNTER — APPOINTMENT (OUTPATIENT)
Dept: RHEUMATOLOGY | Facility: CLINIC | Age: 71
End: 2021-09-22
Payer: COMMERCIAL

## 2021-09-22 VITALS
TEMPERATURE: 98.6 F | BODY MASS INDEX: 26.56 KG/M2 | DIASTOLIC BLOOD PRESSURE: 78 MMHG | OXYGEN SATURATION: 98 % | SYSTOLIC BLOOD PRESSURE: 128 MMHG | HEIGHT: 74 IN | WEIGHT: 207 LBS | HEART RATE: 67 BPM

## 2021-09-22 PROCEDURE — 99203 OFFICE O/P NEW LOW 30 MIN: CPT

## 2021-09-22 RX ORDER — LABETALOL HYDROCHLORIDE 200 MG/1
200 TABLET, FILM COATED ORAL DAILY
Refills: 0 | Status: ACTIVE | COMMUNITY

## 2021-09-22 RX ORDER — LOSARTAN POTASSIUM 25 MG/1
25 TABLET, FILM COATED ORAL DAILY
Refills: 0 | Status: ACTIVE | COMMUNITY

## 2021-09-22 NOTE — HISTORY OF PRESENT ILLNESS
[FreeTextEntry1] : 71 year old male with PMH as listed below presents today for an initial evaluation for OP. \par \par No prior DEXA\par Not currently taking calcium/vitamin d supplements\par Denies previous treatment\par No fragility fx\par denies parental hip fractures\par denies steroid use\par denies hx of inflammatory arthritides\par denies hx of falls\par denies any upcoming or planned jaw surgery ,extractions, etc.\par \par +Right TMJ- mild pain while eating\par NO other complaints\par \par NO pain/swelling to the joints

## 2021-09-22 NOTE — REASON FOR VISIT
[Initial Evaluation] : an initial evaluation [[employer] ] : provided by [employer]   [Interpreters_IDNumber] : [id number] [Interpreters_FullName] : Evangelist [FreeTextEntry3] : Haijosh

## 2021-09-22 NOTE — ASSESSMENT
[FreeTextEntry1] : 71 year old male presents today for an initial evaluation for OP. \par \par No current risk factors\par \par - labs as below\par - DEXA\par - will call pt with results\par \par Discussed treatment plan with the patient. The patient was given the opportunity to ask questions and all questions were answered to their satisfaction.\par

## 2021-09-22 NOTE — PHYSICAL EXAM
[General Appearance - Alert] : alert [General Appearance - In No Acute Distress] : in no acute distress [General Appearance - Well Nourished] : well nourished [General Appearance - Well Developed] : well developed [Sclera] : the sclera and conjunctiva were normal [Outer Ear] : the ears and nose were normal in appearance [Neck Appearance] : the appearance of the neck was normal [Musculoskeletal - Swelling] : no joint swelling seen [] : no rash [No Focal Deficits] : no focal deficits [Oriented To Time, Place, And Person] : oriented to person, place, and time [Involuntary Movements] : no involuntary movements were seen [FreeTextEntry1] : +BL BS

## 2021-09-25 ENCOUNTER — LABORATORY RESULT (OUTPATIENT)
Age: 71
End: 2021-09-25

## 2021-10-06 LAB
25(OH)D3 SERPL-MCNC: 22 NG/ML
ALBUMIN MFR SERPL ELPH: 48.4 %
ALBUMIN SERPL ELPH-MCNC: 3.9 G/DL
ALBUMIN SERPL-MCNC: 3.9 G/DL
ALBUMIN/GLOB SERPL: 1 RATIO
ALP BLD-CCNC: 65 U/L
ALPHA1 GLOB MFR SERPL ELPH: 2.9 %
ALPHA1 GLOB SERPL ELPH-MCNC: 0.2 G/DL
ALPHA2 GLOB MFR SERPL ELPH: 7.8 %
ALPHA2 GLOB SERPL ELPH-MCNC: 0.6 G/DL
ALT SERPL-CCNC: 19 U/L
ANION GAP SERPL CALC-SCNC: 11 MMOL/L
AST SERPL-CCNC: 21 U/L
B-GLOBULIN MFR SERPL ELPH: 7.3 %
B-GLOBULIN SERPL ELPH-MCNC: 0.6 G/DL
BASOPHILS # BLD AUTO: 0.02 K/UL
BASOPHILS NFR BLD AUTO: 0.4 %
BILIRUB SERPL-MCNC: 0.5 MG/DL
BUN SERPL-MCNC: 15 MG/DL
CALCIUM SERPL-MCNC: 8.9 MG/DL
CALCIUM SERPL-MCNC: 8.9 MG/DL
CCP AB SER IA-ACNC: <8 UNITS
CHLORIDE SERPL-SCNC: 105 MMOL/L
CK SERPL-CCNC: 156 U/L
CO2 SERPL-SCNC: 21 MMOL/L
COLLAGEN CTX SERPL-MCNC: 227 PG/ML
CREAT SERPL-MCNC: 1.34 MG/DL
CREAT SPEC-SCNC: 167 MG/DL
CREAT/PROT UR: 0.1 RATIO
CRP SERPL-MCNC: <3 MG/L
ENA SS-A AB SER IA-ACNC: <0.2 AL
ENA SS-B AB SER IA-ACNC: <0.2 AL
EOSINOPHIL # BLD AUTO: 0.07 K/UL
EOSINOPHIL NFR BLD AUTO: 1.3 %
ERYTHROCYTE [SEDIMENTATION RATE] IN BLOOD BY WESTERGREN METHOD: 46 MM/HR
GAMMA GLOB FLD ELPH-MCNC: 2.7 G/DL
GAMMA GLOB MFR SERPL ELPH: 33.6 %
GLUCOSE SERPL-MCNC: 97 MG/DL
HCT VFR BLD CALC: 37.5 %
HGB BLD-MCNC: 11.9 G/DL
IGA 24H UR QL IFE: NORMAL
IMM GRANULOCYTES NFR BLD AUTO: 0.2 %
INTERPRETATION SERPL IEP-IMP: NORMAL
LYMPHOCYTES # BLD AUTO: 1.32 K/UL
LYMPHOCYTES NFR BLD AUTO: 25.3 %
M PROTEIN MFR SERPL ELPH: 28.6 %
M PROTEIN SPEC IFE-MCNC: NORMAL
MAGNESIUM SERPL-MCNC: 2.2 MG/DL
MAN DIFF?: NORMAL
MCHC RBC-ENTMCNC: 28.5 PG
MCHC RBC-ENTMCNC: 31.7 GM/DL
MCV RBC AUTO: 89.9 FL
MONOCLON BAND OBS SERPL: 2.3 G/DL
MONOCYTES # BLD AUTO: 0.87 K/UL
MONOCYTES NFR BLD AUTO: 16.7 %
NEUTROPHILS # BLD AUTO: 2.93 K/UL
NEUTROPHILS NFR BLD AUTO: 56.1 %
PARATHYROID HORMONE INTACT: 51 PG/ML
PHOSPHATE SERPL-MCNC: 3.3 MG/DL
PLATELET # BLD AUTO: 179 K/UL
POTASSIUM SERPL-SCNC: 4.4 MMOL/L
PROT SERPL-MCNC: 7.9 G/DL
PROT SERPL-MCNC: 8 G/DL
PROT SERPL-MCNC: 8 G/DL
PROT UR-MCNC: 10 MG/DL
RBC # BLD: 4.17 M/UL
RBC # FLD: 15.7 %
RF+CCP IGG SER-IMP: NEGATIVE
RHEUMATOID FACT SER QL: <10 IU/ML
SODIUM SERPL-SCNC: 137 MMOL/L
TSH SERPL-ACNC: 2.66 UIU/ML
WBC # FLD AUTO: 5.22 K/UL

## 2021-10-26 ENCOUNTER — APPOINTMENT (OUTPATIENT)
Dept: RADIOLOGY | Facility: CLINIC | Age: 71
End: 2021-10-26
Payer: COMMERCIAL

## 2021-10-26 ENCOUNTER — OUTPATIENT (OUTPATIENT)
Dept: OUTPATIENT SERVICES | Facility: HOSPITAL | Age: 71
LOS: 1 days | End: 2021-10-26
Payer: COMMERCIAL

## 2021-10-26 DIAGNOSIS — Z98.890 OTHER SPECIFIED POSTPROCEDURAL STATES: Chronic | ICD-10-CM

## 2021-10-26 DIAGNOSIS — Z00.8 ENCOUNTER FOR OTHER GENERAL EXAMINATION: ICD-10-CM

## 2021-10-26 PROCEDURE — 77080 DXA BONE DENSITY AXIAL: CPT

## 2021-10-26 PROCEDURE — 77080 DXA BONE DENSITY AXIAL: CPT | Mod: 26

## 2022-01-06 ENCOUNTER — APPOINTMENT (OUTPATIENT)
Dept: RHEUMATOLOGY | Facility: CLINIC | Age: 72
End: 2022-01-06

## 2022-01-26 NOTE — ED ADULT NURSE NOTE - NS ED NURSE REPORT GIVEN TO FT
-- CST Phone (Incoming) Khai Brushjack (Self) [phone] (M)          Alternative phone number:     Turnaround time given to caller:   \"This message will be sent to [state Provider's name]. The clinical team will fulfill your request as soon as they review your message.\"    
Ketan MONTOYA

## 2022-02-03 ENCOUNTER — APPOINTMENT (OUTPATIENT)
Dept: RHEUMATOLOGY | Facility: CLINIC | Age: 72
End: 2022-02-03
Payer: MEDICARE

## 2022-02-03 DIAGNOSIS — M81.0 AGE-RELATED OSTEOPOROSIS W/OUT CURRENT PATHOLOGICAL FRACTURE: ICD-10-CM

## 2022-02-03 PROCEDURE — 99442: CPT | Mod: 95

## 2022-02-03 RX ORDER — ALENDRONATE SODIUM 70 MG/1
70 TABLET ORAL
Qty: 5 | Refills: 6 | Status: ACTIVE | COMMUNITY
Start: 2022-02-03 | End: 1900-01-01

## 2022-02-03 NOTE — HISTORY OF PRESENT ILLNESS
[FreeTextEntry1] : Pt evaluated today for a f.u visit. \par Labs and DEXA reviewed with pt. \par No acute complaints today

## 2022-02-03 NOTE — ASSESSMENT
[FreeTextEntry1] : OP\par \par DEXA reviewed with pt. \par Lowest T score L spine: -2.3. Frax not elevated. \par \par - already started on alendronate by PCP?. Will plan to c/w alendronate 70 mg weekly for now\par - calcium and vitamin d supplementation. \par - regular exercise: aerobic and resistance\par - fall prevention\par \par Discussed treatment plan with the patient. The patient was given the opportunity to ask questions and all questions were answered to their satisfaction.\par

## 2022-02-15 ENCOUNTER — APPOINTMENT (OUTPATIENT)
Dept: PULMONOLOGY | Facility: CLINIC | Age: 72
End: 2022-02-15

## 2022-03-17 ENCOUNTER — APPOINTMENT (OUTPATIENT)
Dept: ORTHOPEDIC SURGERY | Facility: CLINIC | Age: 72
End: 2022-03-17
Payer: COMMERCIAL

## 2022-03-17 DIAGNOSIS — M75.121 COMPLETE ROTATOR CUFF TEAR OR RUPTURE OF RIGHT SHOULDER, NOT SPECIFIED AS TRAUMATIC: ICD-10-CM

## 2022-03-17 DIAGNOSIS — M24.811 OTHER SPECIFIC JOINT DERANGEMENTS OF RIGHT SHOULDER, NOT ELSEWHERE CLASSIFIED: ICD-10-CM

## 2022-03-17 DIAGNOSIS — S42.92XP: ICD-10-CM

## 2022-03-17 DIAGNOSIS — Z98.890 OTHER SPECIFIED POSTPROCEDURAL STATES: ICD-10-CM

## 2022-03-17 DIAGNOSIS — Z86.16 PERSONAL HISTORY OF COVID-19: ICD-10-CM

## 2022-03-17 PROCEDURE — 99214 OFFICE O/P EST MOD 30 MIN: CPT | Mod: 25

## 2022-03-17 PROCEDURE — 73030 X-RAY EXAM OF SHOULDER: CPT | Mod: LT

## 2022-03-17 PROCEDURE — 20610 DRAIN/INJ JOINT/BURSA W/O US: CPT | Mod: RT

## 2022-03-19 PROBLEM — Z98.890 HISTORY OF SHOULDER SURGERY: Status: ACTIVE | Noted: 2020-10-01

## 2022-03-19 PROBLEM — Z86.16 HISTORY OF 2019 NOVEL CORONAVIRUS DISEASE (COVID-19): Status: ACTIVE | Noted: 2020-10-01

## 2022-03-19 PROBLEM — M24.811 INTERNAL DERANGEMENT OF RIGHT SHOULDER: Status: ACTIVE | Noted: 2020-06-05

## 2022-03-19 PROBLEM — M75.121 NONTRAUMATIC COMPLETE TEAR OF RIGHT ROTATOR CUFF: Status: ACTIVE | Noted: 2021-01-26

## 2022-03-19 PROBLEM — S42.92XP: Status: ACTIVE | Noted: 2020-06-05

## 2022-03-19 NOTE — PHYSICAL EXAM
[de-identified] : Physical Exam:\par General: Well appearing, no acute distress\par Neurologic: A&Ox3, No focal deficits\par Head: NCAT without abrasions, lacerations, or ecchymosis to head, face, or scalp\par Eyes: No scleral icterus, no gross abnormalities\par Respiratory: Equal chest wall expansion bilaterally, no accessory muscle use\par Lymphatic: No lymphadenopathy palpated\par Skin: Warm and dry\par Psychiatric: Normal mood and affect\par \par Left Shoulder\par \par Incisions are healed. No surrounding erythema. No drainage. ROM  w/ hitching, ER 25, IR back pocket. Motor and sensation are intact distally. He has full range of motion of all fingers with capillary refill of <2 seconds throughout. He has good nerve function and median nerve, ulnar, radial, PIN, AIN. He has no sensory deficits over the C5-T1 nerve roots. Radial pulses 2+. Radial pulses 2+. Able to make an A-OK sign and thumbs up sign, delayed BILAT, s/p COVID.\par \par Right Shoulder\par ·	Inspection/Palpation: no tenderness, swelling or deformities\par ·	Range of Motion: no crepitus with ROM; Active FF 0 - 90 ; ER at side 0 - 10 ; IR to side ; Passive FF 0 - 90 ; ER at side 0 - 15 ; IR to side\par -              Arc of Motion: ER to 10 degrees, IR to 5 degrees\par ·	Strength: forward elevation in scapular plane [4/5], internal rotation [4/5], external rotation [4/5], adduction [4/5] and abduction [4/5]\par ·	Stability: no joint instability on provocative testing\par ·	Tests: unable due to lack of ROM\par   [de-identified] : 4 views of the left shoulder performed today available for me to review.  They demonstrate adequate placement of the hardware.  Heterotropic ossification noted. Healed of the tuberosity is noted.  No deformities.  No dislocation.\par

## 2022-03-19 NOTE — HISTORY OF PRESENT ILLNESS
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 Currently, he endorses bilat shoulder pain. At last visit, he received a Right shoulder SA injection that provided him relief for several months.

## 2022-03-19 NOTE — DISCUSSION/SUMMARY
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 Currently, he endorses bilat shoulder pain. At last visit, he received a Right shoulder SA injection that provided him relief for several months. \par \par We had a thorough discussion regarding the nature of his pain, the pathophysiology, as well as all treatment options. In regards to his Right shoulder, Pt due to his acute pain elected for a corticosteroid injection at today's visit and tolerated the procedure well. He should take it easy for the next 2-3 days while icing the joint. He understands that in near future he necessitates total shoulder replacement. In regards to his left shoulder, At this point, patient ROM greatly improved, and his pain is from muscular reactivation, otherwise he is doing well and happy with his progress so far. Patient was given prescription of formal physical therapy that he will perform 2x/wk. Patient will follow up in 6-8 wks for repeat clinical assessment or on prn basis. All questions were answered and the patient verbalized understanding. The patient is in agreement with this treatment plan.

## 2022-03-19 NOTE — ADDENDUM
[FreeTextEntry1] : Documented by Kevin Lopez acting as a scribe for Dr. Barr on 03/17/2022. \par \par All medical record entries made by the Scribe were at my, Dr. Barr's, direction and\par personally dictated by me on 03/17/2022. I have reviewed the chart and agree that the record\par accurately reflects my personal performance of the history, physical exam, procedure and imaging.

## 2022-03-19 NOTE — PROCEDURE
[de-identified] : Injection: Right shoulder (Subacromial). \par Indication: Pain/OA \par \par A discussion was had with the patient regarding this procedure and all questions were answered. All risks, benefits and alternatives were discussed. These included but were not limited to bleeding, infection, and allergic reaction. Alcohol was used to clean the skin, and betadine was used to sterilize and prep the area in the posterior aspect of the right shoulder. Ethyl chloride spray was then used as a topical anesthetic. A 22-gauge needle was used to inject 3cc 1% xylocaine, 2cc 0.25% bupivacaine and 2cc of 40mg/mL triamcinolone acetonide into the right subacromial space. A sterile bandage was then applied. The patient tolerated the procedure well and there were no complications.

## 2022-04-20 NOTE — ED ADULT NURSE NOTE - NSIMPLEMENTINTERV_GEN_ALL_ED
Implemented All Universal Safety Interventions:  [place] to call system. Call bell, personal items and telephone within reach. Instruct patient to call for assistance. Room bathroom lighting operational. Non-slip footwear when patient is off stretcher. Physically safe environment: no spills, clutter or unnecessary equipment. Stretcher in lowest position, wheels locked, appropriate side rails in place.
No

## 2022-05-19 ENCOUNTER — APPOINTMENT (OUTPATIENT)
Dept: ORTHOPEDIC SURGERY | Facility: CLINIC | Age: 72
End: 2022-05-19
Payer: COMMERCIAL

## 2022-05-19 DIAGNOSIS — Z98.890 OTHER SPECIFIED POSTPROCEDURAL STATES: ICD-10-CM

## 2022-05-19 DIAGNOSIS — M67.911 UNSPECIFIED DISORDER OF SYNOVIUM AND TENDON, RIGHT SHOULDER: ICD-10-CM

## 2022-05-19 PROCEDURE — 99214 OFFICE O/P EST MOD 30 MIN: CPT

## 2022-05-19 PROCEDURE — 73030 X-RAY EXAM OF SHOULDER: CPT | Mod: LT

## 2022-05-21 NOTE — PHYSICAL EXAM
[de-identified] : Physical Exam:\par General: Well appearing, no acute distress\par Neurologic: A&Ox3, No focal deficits\par Head: NCAT without abrasions, lacerations, or ecchymosis to head, face, or scalp\par Eyes: No scleral icterus, no gross abnormalities\par Respiratory: Equal chest wall expansion bilaterally, no accessory muscle use\par Lymphatic: No lymphadenopathy palpated\par Skin: Warm and dry\par Psychiatric: Normal mood and affect\par \par Left Shoulder\par \par Incisions are healed. No surrounding erythema. No drainage. ROM  w/ hitching, ER 25, IR back pocket. Motor and sensation are intact distally. He has full range of motion of all fingers with capillary refill of <2 seconds throughout. He has good nerve function and median nerve, ulnar, radial, PIN, AIN. He has no sensory deficits over the C5-T1 nerve roots. Radial pulses 2+. Radial pulses 2+. Able to make an A-OK sign and thumbs up sign, delayed BILAT, s/p COVID.\par \par Right Shoulder\par ·	Inspection/Palpation: no tenderness, swelling or deformities\par ·	Range of Motion: no crepitus with ROM; Active FF 0 - 90 ; ER at side 0 - 10 ; IR to side ; Passive FF 0 - 90 ; ER at side 0 - 15 ; IR to side\par -              Arc of Motion: ER to 10 degrees, IR to 5 degrees\par ·	Strength: forward elevation in scapular plane [4/5], internal rotation [4/5], external rotation [4/5], adduction [4/5] and abduction [4/5]\par ·	Stability: no joint instability on provocative testing\par ·	Tests: unable due to lack of ROM\par   [de-identified] : The following radiographs were ordered and read by me during this patients visit. I reviewed each radiograph in detail with the patient and discussed the findings as highlighted below. \par \par 4 views of the Left (L) shoulder show hardware in adequate position with no signs of loosening, overgrowth of bone by artificial head, no fracture, dislocation or bony lesions.

## 2022-05-21 NOTE — DISCUSSION/SUMMARY
[de-identified] : I had a lengthy discussion with the patient regarding their current condition. We discussed the treatment options including operative and nonoperative management. At this time I recommended an MRI of his right shoulder.  He has had 2 subacromial injections one last summer and then again in March which offered him very minimal relief.  We will call him with the results of his MRI.  All questions were answered.

## 2022-05-21 NOTE — REVIEW OF SYSTEMS
[Joint Pain] : joint pain [Negative] : Heme/Lymph [FreeTextEntry9] : biLateral shoulder, Left (L) aftercare TSR, Right (R) shoulder pain

## 2022-05-21 NOTE — HISTORY OF PRESENT ILLNESS
[___ yrs] : [unfilled] year(s) ago [3] : an average pain level of 3/10 [Lifting] : worsened by lifting [Worsening] : worsening [de-identified] : SAGE is a 71 year old male who presents today S/P left reverse total shoulder replacement, 1.5 year ago. Currently, he endorses bilat shoulder pain. At last visit, he received a Right shoulder SA injection that provided him relief for several months.  He is having persistent pain in his shoulder.  He is frustrated with his continued dysfunction. In regards to his Left (L) shoulder, surgical one, he reports crunching and popping with shoulder movements. He localizes most of his pain over anterior aspect of the shoulder.

## 2022-05-27 ENCOUNTER — APPOINTMENT (OUTPATIENT)
Dept: MRI IMAGING | Facility: CLINIC | Age: 72
End: 2022-05-27
Payer: COMMERCIAL

## 2022-05-27 ENCOUNTER — OUTPATIENT (OUTPATIENT)
Dept: OUTPATIENT SERVICES | Facility: HOSPITAL | Age: 72
LOS: 1 days | End: 2022-05-27

## 2022-05-27 DIAGNOSIS — Z98.890 OTHER SPECIFIED POSTPROCEDURAL STATES: Chronic | ICD-10-CM

## 2022-05-27 DIAGNOSIS — M24.811 OTHER SPECIFIC JOINT DERANGEMENTS OF RIGHT SHOULDER, NOT ELSEWHERE CLASSIFIED: ICD-10-CM

## 2022-05-27 PROCEDURE — G1004: CPT

## 2022-05-27 PROCEDURE — 73221 MRI JOINT UPR EXTREM W/O DYE: CPT | Mod: 26,RT,ME

## 2022-08-18 NOTE — PROCEDURE NOTE - NSTIMEOUT_GEN_A_CORE
Patient's first and last name, , procedure, and correct site confirmed prior to the start of procedure. Lateral

## 2023-02-01 NOTE — OCCUPATIONAL THERAPY INITIAL EVALUATION ADULT - RANGE OF MOTION EXAMINATION
Did not test Left UE shoulder joint secondary to fx; Left elbow, wrist, fingers and gross grasp AROM WFL. Right UE shoulder AROM ~15degrees, PROM ~90 degrees, Right elbow, wrist, fingers and gross grasp AROM WFL
none

## 2023-02-27 ENCOUNTER — EMERGENCY (EMERGENCY)
Facility: HOSPITAL | Age: 73
LOS: 1 days | Discharge: HOME | End: 2023-02-27
Attending: EMERGENCY MEDICINE
Payer: COMMERCIAL

## 2023-02-27 VITALS
HEART RATE: 72 BPM | TEMPERATURE: 98 F | DIASTOLIC BLOOD PRESSURE: 79 MMHG | WEIGHT: 214.95 LBS | OXYGEN SATURATION: 100 % | HEIGHT: 74 IN | SYSTOLIC BLOOD PRESSURE: 136 MMHG | RESPIRATION RATE: 18 BRPM

## 2023-02-27 DIAGNOSIS — Z98.890 OTHER SPECIFIED POSTPROCEDURAL STATES: Chronic | ICD-10-CM

## 2023-02-27 PROCEDURE — 73130 X-RAY EXAM OF HAND: CPT | Mod: 26,RT

## 2023-02-27 PROCEDURE — 99284 EMERGENCY DEPT VISIT MOD MDM: CPT | Mod: 57

## 2023-02-27 PROCEDURE — 73130 X-RAY EXAM OF HAND: CPT

## 2023-02-27 PROCEDURE — 99285 EMERGENCY DEPT VISIT HI MDM: CPT | Mod: 25

## 2023-02-27 PROCEDURE — 26600 TREAT METACARPAL FRACTURE: CPT | Mod: 54

## 2023-02-27 PROCEDURE — 26605 TREAT METACARPAL FRACTURE: CPT | Mod: 52,F9

## 2023-02-27 RX ORDER — ACETAMINOPHEN 500 MG
975 TABLET ORAL ONCE
Refills: 0 | Status: COMPLETED | OUTPATIENT
Start: 2023-02-27 | End: 2023-02-27

## 2023-02-27 RX ADMIN — Medication 975 MILLIGRAM(S): at 10:46

## 2023-02-27 NOTE — ED STATDOCS - CLINICAL SUMMARY MEDICAL DECISION MAKING FREE TEXT BOX
Pt with fall to right hand with tenderness; XR and splint hand. Control pain. Pt with fall to right hand with tenderness; XR and splint. Pt with fall to right hand with tenderness; XR and splint.    Guillermo MINOR @ 14:11-- 73-year-old male with a right boxer's fracture.  Attempted reduction times with no success as fracture is unstable.  Patient ultimately placed in a well-padded splint and given hand specialist to follow-up with this week.  Patient stable for discharge, understands the importance of immediate orthopedic follow up as fracture may require surgery. Pt with fall injuring right hand with tenderness of 4th and 5th metacarpal on exam; XR and splint.    Guillermo MINOR @ 14:11-- 73-year-old male with a right boxer's fracture.  Attempted reduction times with no success as fracture is unstable.  Patient ultimately placed in a well-padded splint and given hand specialist to follow-up with this week.  Patient stable for discharge, understands the importance of immediate orthopedic follow up as fracture may require surgery.

## 2023-02-27 NOTE — ED STATDOCS - NEUROLOGICAL, MLM
sensation is normal, motor and strength is normal. sensation and strength of hand/ fingers grossly normal

## 2023-02-27 NOTE — ED STATDOCS - PATIENT PORTAL LINK FT
You can access the FollowMyHealth Patient Portal offered by [facility] by registering at the following website: [email or web address]. By joining [facility]’s FollowMyHealth portal, you will also be able to view your health information using other applications (apps) compatible with our system.

## 2023-02-27 NOTE — ED STATDOCS - NSICDXPASTMEDICALHX_GEN_ALL_CORE_FT
PAST MEDICAL HISTORY:  Back pain     Bursitis left shoulder    History of 2019 novel coronavirus disease (COVID-19)     HTN (hypertension)     Seizure     Shoulder fracture left 3/2020

## 2023-02-27 NOTE — ED STATDOCS - CARE PROVIDER_API CALL
Ro Santiago)  Orthopaedic Surgery; Surgery of the Hand  [address] [place] [address]  Phone: [phone]  Fax: [phone]  Follow Up Time:

## 2023-02-27 NOTE — ED STATDOCS - NS_ ATTENDINGSCRIBEDETAILS _ED_A_ED_FT
I, Farooq Craig, performed the initial face to face bedside interview with this patient regarding history of present illness, review of symptoms and relevant past medical, social and family history.  I completed an independent physical examination.  I was the provider who initially evaluated this patient.  The history, relevant review of systems, past medical and surgical history, medical decision making, and physical examination was documented by the scribe in my presence and I attest to the accuracy of the documentation. Follow-up on ordered tests (ie labs, radiologic studies) and re-evaluation of the patient's status has been communicated to the ACP.  Disposition of the patient will be based on test outcome and response to ED interventions.

## 2023-02-27 NOTE — ED STATDOCS - MUSCULOSKELETAL, MLM
range of motion is not limited to right hand, No snuffbox tenderness, No rotational deformity. 4th and 5th metacarpal tenderness with swelling of the right hand.

## 2023-02-27 NOTE — ED STATDOCS - OBJECTIVE STATEMENT
72 y/o male with PMHx of HTN on medication presents to ED c/o hand injury. Pt states last night, he was in the shower when his foot slipped and pt struck his right hand on his sink. Pt now has swelling to the whole right hand. Pt is right handed. Pt took 2 Motrin for pain last night. No pain to elbow or shoulder. No other traumas or areas of pain. No other complaints as per pt. 72 y/o male with PMHx of HTN on medication presents to ED c/o hand injury. SLip and fall in bathroom last night, striking right hand against sink.  Reports pain and swelling since.  Pt is right handed. Pt took 2 Motrin for pain last night. No pain to elbow or shoulder. No other traumas or areas of pain. No other complaints as per pt.

## 2023-04-06 ENCOUNTER — APPOINTMENT (OUTPATIENT)
Dept: ORTHOPEDIC SURGERY | Facility: CLINIC | Age: 73
End: 2023-04-06
Payer: COMMERCIAL

## 2023-04-06 VITALS
WEIGHT: 207 LBS | SYSTOLIC BLOOD PRESSURE: 165 MMHG | HEIGHT: 74 IN | HEART RATE: 76 BPM | DIASTOLIC BLOOD PRESSURE: 89 MMHG | BODY MASS INDEX: 26.56 KG/M2

## 2023-04-06 DIAGNOSIS — Z96.612 PRESENCE OF LEFT ARTIFICIAL SHOULDER JOINT: ICD-10-CM

## 2023-04-06 PROCEDURE — 99214 OFFICE O/P EST MOD 30 MIN: CPT

## 2023-04-06 NOTE — ADDENDUM
[FreeTextEntry1] : Documented by Tere Bean acting as a scribe for Dr. Barr and Hammad Weems PA-C on 04/06/2023.   All medical record entries made by the Scribe were at my, Dr. Barr, and Hammad Weems's, direction and personally dictated by me on 04/06/2023. I have reviewed the chart and agree that the record accurately reflects my personal performance of the history, physical exam, procedure and imaging.

## 2023-04-06 NOTE — PHYSICAL EXAM
[de-identified] : Physical Exam:\par General: Well appearing, no acute distress\par Neurologic: A&Ox3, No focal deficits\par Head: NCAT without abrasions, lacerations, or ecchymosis to head, face, or scalp\par Eyes: No scleral icterus, no gross abnormalities\par Respiratory: Equal chest wall expansion bilaterally, no accessory muscle use\par Lymphatic: No lymphadenopathy palpated\par Skin: Warm and dry\par Psychiatric: Normal mood and affect\par \par Left Shoulder\par \par Incisions are healed. No surrounding erythema. No drainage. ROM  w/ hitching, ER 25, IR back pocket. Motor and sensation are intact distally. He has full range of motion of all fingers with capillary refill of <2 seconds throughout. He has good nerve function and median nerve, ulnar, radial, PIN, AIN. He has no sensory deficits over the C5-T1 nerve roots. Radial pulses 2+. Radial pulses 2+. Able to make an A-OK sign and thumbs up sign, delayed BILAT, s/p COVID.\par \par Right Shoulder\par ·	Inspection/Palpation: no tenderness, swelling or deformities\par ·	Range of Motion: no crepitus with ROM; Active FF 0 - 90 ; ER at side 0 - 10 ; IR to side ; Passive FF 0 - 90 ; ER at side 0 - 15 ; IR to side\par -              Arc of Motion: ER to 10 degrees, IR to 5 degrees\par ·	Strength: forward elevation in scapular plane [4/5], internal rotation [4/5], external rotation [4/5], adduction [4/5] and abduction [4/5]\par ·	Stability: no joint instability on provocative testing\par ·	Tests: unable due to lack of ROM\par

## 2023-04-06 NOTE — HISTORY OF PRESENT ILLNESS
[de-identified] : SAGE is a 71 year old male who presents today S/P left reverse total shoulder replacement. He notes his hands feel like they get stuck sometimes and is hard to move, they also shake sometimes. He notes some numbness of the upper arm.

## 2023-04-06 NOTE — DISCUSSION/SUMMARY
[de-identified] : We had a thorough discussion regarding the nature of his pain, the pathophysiology, as well as all treatment options. Due to his neurological symptoms I have referred him to Dr. Marinelli for his hand/arm numbness. He will follow up with me after. All questions were answered and the patient verbalized understanding. The patient is in agreement with this treatment plan.

## 2023-04-14 NOTE — ED ADULT TRIAGE NOTE - PATIENT ON (OXYGEN DELIVERY METHOD)
Rx Refill Note  Requested Prescriptions     Pending Prescriptions Disp Refills   • rosuvastatin (CRESTOR) 40 MG tablet [Pharmacy Med Name: Rosuvastatin Calcium 40 MG Oral Tablet] 90 tablet 0     Sig: TAKE 1 TABLET BY MOUTH ONCE DAILY AT NIGHT      Last office visit with prescribing clinician: 12/13/2022   Next office visit with prescribing clinician: F/U IN BARBARA Kenney MA  04/14/23, 10:33 EDT  
room air

## 2023-06-15 ENCOUNTER — APPOINTMENT (OUTPATIENT)
Dept: ORTHOPEDIC SURGERY | Facility: CLINIC | Age: 73
End: 2023-06-15
Payer: MEDICARE

## 2023-06-15 VITALS — HEART RATE: 81 BPM | SYSTOLIC BLOOD PRESSURE: 151 MMHG | DIASTOLIC BLOOD PRESSURE: 82 MMHG

## 2023-06-15 DIAGNOSIS — M79.646 PAIN IN UNSPECIFIED HAND: ICD-10-CM

## 2023-06-15 DIAGNOSIS — M79.643 PAIN IN UNSPECIFIED HAND: ICD-10-CM

## 2023-06-15 DIAGNOSIS — M65.30 TRIGGER FINGER, UNSPECIFIED FINGER: ICD-10-CM

## 2023-06-15 PROCEDURE — 99214 OFFICE O/P EST MOD 30 MIN: CPT | Mod: 25

## 2023-06-15 PROCEDURE — 20550 NJX 1 TENDON SHEATH/LIGAMENT: CPT | Mod: 59,RT

## 2023-06-15 PROCEDURE — 73130 X-RAY EXAM OF HAND: CPT | Mod: 50

## 2023-06-15 NOTE — ASSESSMENT
[FreeTextEntry1] : ASSESSMENT:\par \par The patient comes in today with multiple complaints including left index and middle finger pain swelling and tendinopathy i.e. trigger finger as well as right-sided index, small, thumb tendinopathy i.e. trigger finger as well.  At this point in time we have discussed the injection for all the above findings.  We have discussed prognosis as well and treatment modalities\par \par The patient was adequately and thoroughly informed of my assessment of their current condition(s).  - This may diminish bodily function for the extremity.\par We discussed prognosis, treatment modalities including operative and nonoperative options for the above diagnostic assessment.\par [For this I was able to review other physician’s note(s) including reviewing other tests, imaging results as well as personally view these results for my own interpretation.]\par  \par Injection:\par \par The risks and benefits of a steroid injection were discussed in detail. The risks include but are not limited to: pain, infection, swelling, flare response, bleeding, subcutaneous fat atrophy, skin depigmentation and/or elevation of blood sugar. The risk of incomplete resolution of symptoms, recurrence and additional intervention was reviewed and considered by the patient. \par The patient agreed to proceed and under a sterile prep, I injected 1 unit into 2 cc of a combination of Celestone and Lidocaine into the left index A1, left middle A1, right index A1, right small A1, right thumb A1. The patient tolerated the injection well.\par \par The patient was adequately and thoroughly informed of my assessment of their current condition(s). \par \par DISCUSSION:\par 1.  I hope the injections for the left index and middle as well as the right index, small and thumb will help relieve his tendinitis\par 2.  We will see each other again in 3 months time to reassess\par

## 2023-06-15 NOTE — PHYSICAL EXAM
[de-identified] : Examination of the [right] hand particularly at the A1 of the index, small, thumb reveals tenderness with a palpable click. \par Examination of the left hand particularly at the A1 of the [index and middle] reveals tenderness with a palpable click. \par \par \par  [de-identified] : [4] views of [bilateral hands and wrists] were obtained today in my office and were seen by me and discussed with the patient. 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

## 2023-06-15 NOTE — HISTORY OF PRESENT ILLNESS
[FreeTextEntry1] : Rusty is a pleasant 73-year-old male who presents today with complaints of multiple fingers triggering with episodes of stiffness and discomfort bilaterally at the hands

## 2023-11-22 NOTE — REASON FOR VISIT
[Follow-Up Visit] : a follow-up visit for [FreeTextEntry2] : s/p left reverse TSR. DOS: 8/19/2020.  complained of lower ext weakness. denies any saddle anesthesia fecal or urinary incontinence. neuro function intact. had some electrolyte abnormalities likely from dehydration .  cont to monitor   states LE weakness resolved

## 2023-11-30 ENCOUNTER — APPOINTMENT (OUTPATIENT)
Dept: ORTHOPEDIC SURGERY | Facility: CLINIC | Age: 73
End: 2023-11-30

## 2024-07-11 NOTE — ED PROVIDER NOTE - NEUROLOGICAL, MLM
Is This A New Presentation, Or A Follow-Up?: Follow Up Allergic Contact Dermatitis
Alert, tracking, moving all extremities equally, intermitently follows simple commands, aphasic, not answering questions

## 2024-08-21 NOTE — PATIENT PROFILE ADULT - FUNCTIONAL SCREEN CURRENT LEVEL: COMMUNICATION, MLM
normal mood with appropriate affect , speech clear
3 = unable to understand or speak (not related to language barrier)

## 2024-10-14 NOTE — DISCHARGE NOTE NURSING/CASE MANAGEMENT/SOCIAL WORK - NSDPDISTO_GEN_ALL_CORE
08/08/2024  AUTH REQUIRED DAY ORDERS (or CHANGE IN ORDERS) ARE RECEIVED. PLEASE NOTIFY PA TEAM ONCE ORDERS RECEIVED.     PT HAS COVERAGE FOR TPA, LINE CARE, NEULASTA (AUTH REQUIRED), HOME DISCONNECTS AND HYDRATION. AJ   Sub-Acute rehab

## 2024-11-13 NOTE — ASU PREOP CHECKLIST - WEIGHT IN LBS

## 2024-12-31 NOTE — HISTORY OF PRESENT ILLNESS
[Clean/Dry/Intact] : clean, dry and intact [Neuro Intact] : an unremarkable neurological exam [Vascular Intact] : ~T peripheral vascular exam normal [Xray (Date:___)] : [unfilled] Xray -  [Chills] : no chills [Fever] : no fever [Nausea] : no nausea [Vomiting] : no vomiting [Erythema] : not erythematous [Discharge] : absent of discharge [Dehiscence] : not dehisced [de-identified] : S/P left reverse total shoulder replacement. DOS: 8/19/2020. [de-identified] : SAGE is a 70 year old male who presents today 6 weeks S/P left reverse total shoulder replacement. Overall he is doing very well. He has no complaints and is not taking any medications for his pain. [de-identified] : Left Shoulder\par \par Incisions are clean, dry and intact. No surrounding erythema. No drainage. Wound appears to be healing well. Unable to test ROM due to recent surgical procedure. Motor and sensation are intact distally. He has full range of motion of all fingers with capillary refill of <2 seconds throughout. He has good nerve function and median nerve, ulnar, radial, PIN, AIN. He has no sensory deficits over the C5-T1 nerve roots. Radial pulses 2+. Radial pulses 2+. Able to make an A-OK sign and thumbs up sign, delayed BILAT, s/p COVID. [de-identified] : 2 view xrays of pts left shoulder were performed today and available for me to review. They demonstrate adequate position of hardware. No fracture. No dislocation. No other deformities. [de-identified] : SAGE is a 70 year male who is doing extremely well and is without complaints. He presents today without his sling and pt is tolerating this well. He will continue to progress his weightbearing and ROM with PT as they feel necessary. Surgical sites are clean and dry without warmth or erythema, pt denies fever or chills.  He will continue PT 2-3x/week alongside HEP until we see them again in 6 weeks for clinical reassessment. He may use tylenol prn for pain. \par \par Due to sequlae secondary to COVID infection, I recommend BILAT hand OT to increase dexterity/ strength with consultation by Rheumatology. I added a referral for JORDANA lazo through [facility] to assist the patient in this process.  He agrees to the latter plan and does not have any further questions or concerns. ABDOMINAL DISTENSION/CONSTIPATION/NAUSEA/PAIN/TENDERNESS

## 2025-01-26 NOTE — H&P ADULT - CONSTITUTIONAL
Prep Survey      Flowsheet Row Responses   [facility] facility patient discharged from? [place]   Is LACE score < 7 ? No   Eligibility [facility]   Date of Admission 01/22/25   Date of Discharge 01/25/25   Discharge Disposition Home or Self Care   Discharge diagnosis Pneumonia   Does the patient have one of the following disease processes/diagnoses(primary or secondary)? Pneumonia   Does the patient have Home health ordered? Yes   What is the Home health agency?  [facility]-pending at discharge   Is there a DME ordered? Yes   What DME was ordered? [employer]   Prep survey completed? Yes            MONI HILLIARD - Registered Nurse          
detailed exam

## 2025-05-29 NOTE — ED PROVIDER NOTE - PROGRESS NOTE ADDITIONAL1
Patient called in to schedule New Patient appointment. Patient was discharged from Center two weeks ago and was referred to neurology by his family doctor.    Patient states he has no neurological symptoms and is not sure why his PCP is referring him. Patient will call his family doctor and call back with more information so he can be scheduled appropriately.      Additional Progress Note...

## 2025-09-09 ENCOUNTER — EMERGENCY (EMERGENCY)
Facility: HOSPITAL | Age: 75
LOS: 1 days | End: 2025-09-09
Attending: STUDENT IN AN ORGANIZED HEALTH CARE EDUCATION/TRAINING PROGRAM
Payer: COMMERCIAL

## 2025-09-09 VITALS
OXYGEN SATURATION: 98 % | TEMPERATURE: 98 F | HEART RATE: 64 BPM | DIASTOLIC BLOOD PRESSURE: 65 MMHG | SYSTOLIC BLOOD PRESSURE: 105 MMHG | RESPIRATION RATE: 20 BRPM

## 2025-09-09 VITALS
RESPIRATION RATE: 20 BRPM | HEART RATE: 99 BPM | TEMPERATURE: 98 F | OXYGEN SATURATION: 98 % | SYSTOLIC BLOOD PRESSURE: 172 MMHG | DIASTOLIC BLOOD PRESSURE: 82 MMHG

## 2025-09-09 DIAGNOSIS — Z98.890 OTHER SPECIFIED POSTPROCEDURAL STATES: Chronic | ICD-10-CM

## 2025-09-09 PROBLEM — C61 MALIGNANT NEOPLASM OF PROSTATE: Chronic | Status: ACTIVE | Noted: 2025-05-20

## 2025-09-09 LAB
ALBUMIN SERPL ELPH-MCNC: 3.1 G/DL — LOW (ref 3.3–5.2)
ALP SERPL-CCNC: 59 U/L — SIGNIFICANT CHANGE UP (ref 40–120)
ALT FLD-CCNC: 26 U/L — SIGNIFICANT CHANGE UP
ANION GAP SERPL CALC-SCNC: 11 MMOL/L — SIGNIFICANT CHANGE UP (ref 5–17)
APPEARANCE UR: CLEAR — SIGNIFICANT CHANGE UP
AST SERPL-CCNC: 32 U/L — SIGNIFICANT CHANGE UP
BACTERIA # UR AUTO: ABNORMAL /HPF
BASOPHILS # BLD AUTO: 0.01 K/UL — SIGNIFICANT CHANGE UP (ref 0–0.2)
BASOPHILS NFR BLD AUTO: 0.2 % — SIGNIFICANT CHANGE UP (ref 0–2)
BILIRUB SERPL-MCNC: 0.5 MG/DL — SIGNIFICANT CHANGE UP (ref 0.4–2)
BILIRUB UR-MCNC: NEGATIVE — SIGNIFICANT CHANGE UP
BUN SERPL-MCNC: 56.4 MG/DL — HIGH (ref 8–20)
CALCIUM SERPL-MCNC: 8.1 MG/DL — LOW (ref 8.4–10.5)
CAST: 1 /LPF — SIGNIFICANT CHANGE UP (ref 0–4)
CHLORIDE SERPL-SCNC: 107 MMOL/L — SIGNIFICANT CHANGE UP (ref 96–108)
CO2 SERPL-SCNC: 17 MMOL/L — LOW (ref 22–29)
COLOR SPEC: YELLOW — SIGNIFICANT CHANGE UP
CREAT SERPL-MCNC: 3.15 MG/DL — HIGH (ref 0.5–1.3)
DIFF PNL FLD: ABNORMAL
EGFR: 20 ML/MIN/1.73M2 — LOW
EGFR: 20 ML/MIN/1.73M2 — LOW
EOSINOPHIL # BLD AUTO: 0.14 K/UL — SIGNIFICANT CHANGE UP (ref 0–0.5)
EOSINOPHIL NFR BLD AUTO: 2.3 % — SIGNIFICANT CHANGE UP (ref 0–6)
GLUCOSE SERPL-MCNC: 60 MG/DL — LOW (ref 70–99)
GLUCOSE UR QL: NEGATIVE MG/DL — SIGNIFICANT CHANGE UP
HCT VFR BLD CALC: 28.8 % — LOW (ref 39–50)
HGB BLD-MCNC: 9.3 G/DL — LOW (ref 13–17)
IMM GRANULOCYTES # BLD AUTO: 0.01 K/UL — SIGNIFICANT CHANGE UP (ref 0–0.07)
IMM GRANULOCYTES NFR BLD AUTO: 0.2 % — SIGNIFICANT CHANGE UP (ref 0–0.9)
KETONES UR QL: NEGATIVE MG/DL — SIGNIFICANT CHANGE UP
LACTATE SERPL-SCNC: 1.3 MMOL/L — SIGNIFICANT CHANGE UP (ref 0.5–2)
LEUKOCYTE ESTERASE UR-ACNC: NEGATIVE — SIGNIFICANT CHANGE UP
LIDOCAIN IGE QN: 40 U/L — SIGNIFICANT CHANGE UP (ref 22–51)
LYMPHOCYTES # BLD AUTO: 1.32 K/UL — SIGNIFICANT CHANGE UP (ref 1–3.3)
LYMPHOCYTES NFR BLD AUTO: 21.7 % — SIGNIFICANT CHANGE UP (ref 13–44)
MCHC RBC-ENTMCNC: 28.3 PG — SIGNIFICANT CHANGE UP (ref 27–34)
MCHC RBC-ENTMCNC: 32.3 G/DL — SIGNIFICANT CHANGE UP (ref 32–36)
MCV RBC AUTO: 87.5 FL — SIGNIFICANT CHANGE UP (ref 80–100)
MONOCYTES # BLD AUTO: 0.98 K/UL — HIGH (ref 0–0.9)
MONOCYTES NFR BLD AUTO: 16.1 % — HIGH (ref 2–14)
NEUTROPHILS # BLD AUTO: 3.61 K/UL — SIGNIFICANT CHANGE UP (ref 1.8–7.4)
NEUTROPHILS NFR BLD AUTO: 59.5 % — SIGNIFICANT CHANGE UP (ref 43–77)
NITRITE UR-MCNC: NEGATIVE — SIGNIFICANT CHANGE UP
NRBC # BLD AUTO: 0 K/UL — SIGNIFICANT CHANGE UP (ref 0–0)
NRBC # FLD: 0 K/UL — SIGNIFICANT CHANGE UP (ref 0–0)
NRBC BLD AUTO-RTO: 0 /100 WBCS — SIGNIFICANT CHANGE UP (ref 0–0)
PH UR: 5.5 — SIGNIFICANT CHANGE UP (ref 5–8)
PLATELET # BLD AUTO: 168 K/UL — SIGNIFICANT CHANGE UP (ref 150–400)
PMV BLD: 10 FL — SIGNIFICANT CHANGE UP (ref 7–13)
POTASSIUM SERPL-MCNC: 4.9 MMOL/L — SIGNIFICANT CHANGE UP (ref 3.5–5.3)
POTASSIUM SERPL-SCNC: 4.9 MMOL/L — SIGNIFICANT CHANGE UP (ref 3.5–5.3)
PROT SERPL-MCNC: 9.7 G/DL — HIGH (ref 6.6–8.7)
PROT UR-MCNC: 100 MG/DL
RBC # BLD: 3.29 M/UL — LOW (ref 4.2–5.8)
RBC # FLD: 14.4 % — SIGNIFICANT CHANGE UP (ref 10.3–14.5)
RBC CASTS # UR COMP ASSIST: 0 /HPF — SIGNIFICANT CHANGE UP (ref 0–4)
SODIUM SERPL-SCNC: 135 MMOL/L — SIGNIFICANT CHANGE UP (ref 135–145)
SP GR SPEC: 1.01 — SIGNIFICANT CHANGE UP (ref 1–1.03)
SQUAMOUS # UR AUTO: 0 /HPF — SIGNIFICANT CHANGE UP (ref 0–5)
UROBILINOGEN FLD QL: 0.2 MG/DL — SIGNIFICANT CHANGE UP (ref 0.2–1)
WBC # BLD: 6.07 K/UL — SIGNIFICANT CHANGE UP (ref 3.8–10.5)
WBC # FLD AUTO: 6.07 K/UL — SIGNIFICANT CHANGE UP (ref 3.8–10.5)
WBC UR QL: 0 /HPF — SIGNIFICANT CHANGE UP (ref 0–5)

## 2025-09-09 PROCEDURE — 93005 ELECTROCARDIOGRAM TRACING: CPT

## 2025-09-09 PROCEDURE — 85025 COMPLETE CBC W/AUTO DIFF WBC: CPT

## 2025-09-09 PROCEDURE — 99284 EMERGENCY DEPT VISIT MOD MDM: CPT | Mod: 25

## 2025-09-09 PROCEDURE — 80053 COMPREHEN METABOLIC PANEL: CPT

## 2025-09-09 PROCEDURE — 99285 EMERGENCY DEPT VISIT HI MDM: CPT

## 2025-09-09 PROCEDURE — 81001 URINALYSIS AUTO W/SCOPE: CPT

## 2025-09-09 PROCEDURE — 83605 ASSAY OF LACTIC ACID: CPT

## 2025-09-09 PROCEDURE — 83690 ASSAY OF LIPASE: CPT

## 2025-09-09 PROCEDURE — 36415 COLL VENOUS BLD VENIPUNCTURE: CPT

## 2025-09-09 PROCEDURE — 93010 ELECTROCARDIOGRAM REPORT: CPT
